# Patient Record
Sex: FEMALE | Race: WHITE | Employment: UNEMPLOYED | URBAN - METROPOLITAN AREA
[De-identification: names, ages, dates, MRNs, and addresses within clinical notes are randomized per-mention and may not be internally consistent; named-entity substitution may affect disease eponyms.]

---

## 2020-08-27 ENCOUNTER — OFFICE VISIT (OUTPATIENT)
Dept: NEUROLOGY | Age: 45
End: 2020-08-27
Payer: MEDICAID

## 2020-08-27 VITALS
WEIGHT: 172 LBS | BODY MASS INDEX: 27.64 KG/M2 | DIASTOLIC BLOOD PRESSURE: 82 MMHG | RESPIRATION RATE: 16 BRPM | HEART RATE: 83 BPM | TEMPERATURE: 98.2 F | HEIGHT: 66 IN | OXYGEN SATURATION: 98 % | SYSTOLIC BLOOD PRESSURE: 116 MMHG

## 2020-08-27 DIAGNOSIS — R41.3 DISTURBANCE OF MEMORY: Primary | ICD-10-CM

## 2020-08-27 DIAGNOSIS — R41.82 ACUTE ON CHRONIC ALTERATION IN MENTAL STATUS: ICD-10-CM

## 2020-08-27 DIAGNOSIS — G40.209 COMPLEX PARTIAL SEIZURES EVOLVING TO GENERALIZED TONIC-CLONIC SEIZURES (HCC): ICD-10-CM

## 2020-08-27 DIAGNOSIS — R55 CONVULSIVE SYNCOPE: ICD-10-CM

## 2020-08-27 DIAGNOSIS — E53.8 B12 DEFICIENCY: ICD-10-CM

## 2020-08-27 DIAGNOSIS — G73.7 METABOLIC MYOPATHY: ICD-10-CM

## 2020-08-27 DIAGNOSIS — I65.23 BILATERAL CAROTID ARTERY STENOSIS: ICD-10-CM

## 2020-08-27 DIAGNOSIS — I67.89 CEREBRAL MICROVASCULAR DISEASE: ICD-10-CM

## 2020-08-27 DIAGNOSIS — R53.1 WEAKNESS GENERALIZED: ICD-10-CM

## 2020-08-27 DIAGNOSIS — E88.9 METABOLIC MYOPATHY: ICD-10-CM

## 2020-08-27 PROCEDURE — 99205 OFFICE O/P NEW HI 60 MIN: CPT | Performed by: PSYCHIATRY & NEUROLOGY

## 2020-08-27 PROCEDURE — 93880 EXTRACRANIAL BILAT STUDY: CPT | Performed by: PSYCHIATRY & NEUROLOGY

## 2020-08-27 RX ORDER — BUPROPION HYDROCHLORIDE 150 MG/1
150 TABLET, EXTENDED RELEASE ORAL 2 TIMES DAILY
COMMUNITY
Start: 2020-08-01 | End: 2020-11-30

## 2020-08-27 RX ORDER — LANOLIN ALCOHOL/MO/W.PET/CERES
1000 CREAM (GRAM) TOPICAL DAILY
Status: ON HOLD | COMMUNITY
End: 2022-08-22

## 2020-08-27 RX ORDER — PANTOPRAZOLE SODIUM 40 MG/1
TABLET, DELAYED RELEASE ORAL
COMMUNITY
Start: 2020-06-11

## 2020-08-27 RX ORDER — IBUPROFEN 200 MG
TABLET ORAL
COMMUNITY
Start: 2016-03-24 | End: 2022-08-30

## 2020-08-27 RX ORDER — SENNOSIDES 15 MG
25 TABLET ORAL DAILY
COMMUNITY
End: 2022-08-30

## 2020-08-27 RX ORDER — ZIPRASIDONE HYDROCHLORIDE 40 MG/1
40 CAPSULE ORAL DAILY
COMMUNITY
Start: 2020-08-12 | End: 2022-08-30

## 2020-08-27 RX ORDER — AMITRIPTYLINE HYDROCHLORIDE 150 MG/1
150 TABLET, FILM COATED ORAL AT BEDTIME
COMMUNITY
Start: 2019-07-02 | End: 2022-08-30

## 2020-08-27 RX ORDER — FLUTICASONE FUROATE AND VILANTEROL TRIFENATATE 100; 25 UG/1; UG/1
1 POWDER RESPIRATORY (INHALATION)
COMMUNITY
End: 2020-08-27 | Stop reason: ALTCHOICE

## 2020-08-27 RX ORDER — FLUTICASONE PROPIONATE AND SALMETEROL 250; 50 UG/1; UG/1
1 POWDER RESPIRATORY (INHALATION)
COMMUNITY
Start: 2016-12-12 | End: 2020-08-27 | Stop reason: ALTCHOICE

## 2020-08-27 RX ORDER — CITALOPRAM 40 MG/1
40 TABLET, FILM COATED ORAL DAILY
Status: ON HOLD | COMMUNITY
Start: 2019-07-02 | End: 2022-09-26 | Stop reason: SDUPTHER

## 2020-08-27 RX ORDER — RISPERIDONE 2 MG/1
TABLET, FILM COATED ORAL
COMMUNITY
Start: 2014-01-31 | End: 2020-08-27 | Stop reason: ALTCHOICE

## 2020-08-27 RX ORDER — CHOLECALCIFEROL TAB 125 MCG (5000 UNIT) 125 MCG
TAB ORAL DAILY
COMMUNITY

## 2020-08-27 RX ORDER — MELATONIN
DAILY
Status: ON HOLD | COMMUNITY
End: 2022-08-22 | Stop reason: ALTCHOICE

## 2020-08-27 RX ORDER — CARBAMAZEPINE 200 MG/1
TABLET ORAL
Status: ON HOLD | COMMUNITY
Start: 2019-07-02 | End: 2022-09-26 | Stop reason: SDUPTHER

## 2020-08-27 RX ORDER — ALBUTEROL SULFATE 90 UG/1
AEROSOL, METERED RESPIRATORY (INHALATION)
Status: ON HOLD | COMMUNITY
Start: 2016-03-07 | End: 2022-08-22

## 2020-08-27 RX ORDER — MICONAZOLE NITRATE 2 %
CREAM WITH APPLICATOR VAGINAL
COMMUNITY
Start: 2019-07-21 | End: 2020-08-27 | Stop reason: ALTCHOICE

## 2020-08-27 RX ORDER — IBUPROFEN 600 MG/1
TABLET ORAL
COMMUNITY
Start: 2018-11-02 | End: 2020-08-27 | Stop reason: ALTCHOICE

## 2020-08-27 RX ORDER — ESOMEPRAZOLE MAGNESIUM 40 MG/1
CAPSULE, DELAYED RELEASE ORAL
Status: ON HOLD | COMMUNITY
End: 2022-08-22

## 2020-08-27 RX ORDER — BUDESONIDE AND FORMOTEROL FUMARATE DIHYDRATE 160; 4.5 UG/1; UG/1
AEROSOL RESPIRATORY (INHALATION)
COMMUNITY
Start: 2016-09-12 | End: 2020-08-27 | Stop reason: ALTCHOICE

## 2020-08-27 RX ORDER — LORATADINE 10 MG/1
TABLET ORAL
Status: ON HOLD | COMMUNITY
Start: 2019-08-04 | End: 2022-08-22

## 2020-08-27 NOTE — LETTER
8/27/20 Patient: Nhan Dai YOB: 1975 Date of Visit: 8/27/2020 Sawyer Chavez MD 
[address] [facility] [address] VIA Facsimile: [phone] Dear Sawyer Chavez MD, Thank you for referring Ms. Jennifer Chew to [address] for evaluation. My notes for this consultation are attached. Consult REFERRED BY: 
Arlan Saint, MD 
 
CHIEF COMPLAINT: Memory loss, weakness in the legs, tingling in her head, losing muscle control, difficulty walking, weakness in her arms, staring spells, and the past history of seizures possibly. Subjective:  
 
Jennifer Le is a 54 y.o. right-handed  female seen as a new patient to me at the request of Dr. Shobha Dempsey for a multiplicity of neurologic complaints of generalized weakness, increasing weakness in her arms and legs, tingling in her head, increasing memory loss, difficulty concentrating, difficulty organizing her thoughts, more unsteady gait, and more spells of staring off in the past history of seizures. Patient says that these have been going on for about 6 to 12 months time. They are gradually getting worse. She knows of no precipitating factors as far as unusual head injury, infections, illnesses, medications, but is under increased stress and tension. She is also been found to have recent elevated white count that is being evaluated by her medical doctors. We have none of that lab work available to us at this time. She said her primary doctor has just checked a bunch of blood test.  For her episodes of staring off and her spells, we checked a carotid Doppler that was unremarkable today. We will check an ambulatory 24-hour EEG in addition, and an EMG because of her muscle weakness, and check her muscle enzymes, and check treatable causes of her memory loss as far as B12 and vitamin D, she says her PCP has checked the thyroid.   We will refer her to neuropsych testing for the memory loss to rule out dementia versus pseudodementia, and she will get an MRI of the brain because of increasing headaches, and increasing weakness in her arms and legs. Other blood test done to rule out treatable causes of her weakness like a myasthenia titer and protein immunoelectrophoresis. Patient does have 1/12 grade education but no family history of dementia. She has had no unusual fever or meningismus, and denies any unusual head injury or other causes or toxin exposure for her memory loss. Patient does have bipolar disorder and takes antidepressants and Tegretol. Patient has a high school education. Past Medical History:  
Diagnosis Date  ADHD  Bipolar disease, chronic ([facility]  Borderline personality disorder ([facility]  Hepatitis C   
 Liver disease Hepatitis C: resolved Past Surgical History:  
Procedure Laterality Date  HX GYN    
 C Section x 3  
 HX GYN    
 D & C  
 HX GYN  x 3 Family History Problem Relation Age of Onset  Heart Disease Mother  Diabetes Mother  Thyroid Disease Mother  Hypertension Mother  No Known Problems Father  Cancer Maternal Grandmother   
     kidney Social History Tobacco Use  Smoking status: Current Every Day Smoker Packs/day: 1.00  Smokeless tobacco: Never Used Substance Use Topics  Alcohol use: Not Currently Current Outpatient Medications:  
  albuterol (PROVENTIL HFA, VENTOLIN HFA, PROAIR HFA) 90 mcg/actuation inhaler, 2 puffs as needed Inhalation every 4 hrs for 30 days, Disp: , Rfl:  
  buPROPion SR (WELLBUTRIN SR) 150 mg SR tablet, 150 mg two (2) times a day., Disp: , Rfl:  
  carBAMazepine (TEGretol) 200 mg tablet, 1 tablet Orally Twice a day, Disp: , Rfl:  
  citalopram (CELEXA) 40 mg tablet, Take 40 mg by mouth daily. , Disp: , Rfl:  
  esomeprazole (NEXIUM) 40 mg capsule, take 1 capsule by mouth once daily Orally 1 time daily, Disp: , Rfl:  
  loratadine (Claritin) 10 mg tablet, 1 tablet Orally Once a day for 90 days, Disp: , Rfl:  
  pantoprazole (PROTONIX) 40 mg tablet, TAKE 1 TABLET BY MOUTH ONCE DAILY, Disp: , Rfl:  
  ziprasidone (GEODON) 40 mg capsule, Take 40 mg by mouth daily. , Disp: , Rfl:  
  amitriptyline (ELAVIL) 150 mg tablet, Take 150 mg by mouth At bedtime. , Disp: , Rfl:  
  sennosides (Laxative, sennosides,) 25 mg tab, Take 25 mg by mouth daily. , Disp: , Rfl:  
  cyanocobalamin 1,000 mcg tablet, Take 1,000 mcg by mouth daily. , Disp: , Rfl:  
  cholecalciferol (Vitamin D3) (1000 Units /25 mcg) tablet, Take  by mouth daily. , Disp: , Rfl:  
  nicotine (Nicoderm CQ) 21 mg/24 hr, 1 patch to skin Transdermal Once a day - remove patch at night for 42 days, Disp: , Rfl:  
  cholecalciferol (VITAMIN D3) (5000 Units/125 mcg) tab tablet, Take  by mouth daily. , Disp: , Rfl:  
  clonazePAM (KLONOPIN) 0.5 mg tablet, Take 0.5 mg by mouth three (3) times daily. , Disp: , Rfl:  
  OXCARBAZEPINE PO, Take  by mouth., Disp: , Rfl:  
 
 
 
Allergies Allergen Reactions  Percocet [Oxycodone-Acetaminophen] Hives MRI Results (most recent): No results found for this or any previous visit. No results found for this or any previous visit. Review of Systems: A comprehensive review of systems was negative except for: Constitutional: positive for fatigue and malaise Musculoskeletal: positive for myalgias, arthralgias, stiff joints, neck pain, back pain and muscle weakness Neurological: positive for headaches, memory problems, speech problems, coordination problems, gait problems and weakness Behvioral/Psych: positive for anxiety and depression Vitals:  
 08/27/20 0807 BP: 116/82 Pulse: 83 Resp: 16 Temp: 98.2 °F (36.8 °C) SpO2: 98% Weight: 172 lb (78 kg) Height: 5' 6\" (1.676 m) Objective: I 
 
 
NEUROLOGICAL EXAM: 
 
 Appearance: The patient is poorly developed, well nourished, provides a coherent history and is in no acute distress. Mental Status: Oriented to time, place and person, and the president, she is able to remember 2 of 3 words at 30 seconds with distraction, cannot do serial sevens by counting on her fingers, can spell world backward and draw a clock showing the time 10:50, cognitive function is relatively normal and speech is fluent and no aphasia or dysarthria. Mood and affect appropriate but severely depressed. Cranial Nerves:   Intact visual fields. Fundi are benign, disc are flat, no lesions seen on funduscopy. NICOLASA, EOM's full, no nystagmus, no ptosis. Facial sensation is normal. Corneal reflexes are not tested. Facial movement is symmetric. Hearing is normal bilaterally. Palate is midline with normal sternocleidomastoid and trapezius muscles are normal. Tongue is midline. Neck without meningismus or bruits Temporal arteries are not tender or enlarged TMJ areas are not tender on palpation Motor:  4/5 strength in upper and lower proximal and distal muscles. Normal bulk and tone. No fasciculations. Rapid alternating movement is symmetric and intact bilaterally Reflexes:   Deep tendon reflexes 1+/4 and symmetrical. 
No babinski or clonus present Sensory:   Normal to touch, pinprick and vibration and temperature. DSS is intact Gait:  Normal gait for patient's age. Tremor:   No tremor noted. Cerebellar:  No abnormal cerebellar signs present on Romberg and tandem testing and finger-nose-finger exam.  
Neurovascular:  Normal heart sounds and regular rhythm, peripheral pulses intact, and no carotid bruits. Assessment: ICD-10-CM ICD-9-CM 1. Disturbance of memory  R41.3 780.93 CK IMMUNOELECTROPHORESIS (IMMUNOFIX.) MAGNESIUM  
   MYASTHENIA GRAVIS EVALUATION  
   ALEKSEY COMPREHENSIVE PLUS PANEL  
   DUPLEX CAROTID BILATERAL  
   EMG LIMITED  
   MRI BRAIN W WO CONT VITAMIN B12 & FOLATE  
   NEURO EEG 24 HR  
   REFERRAL TO NEUROPSYCHOLOGY 2. Acute on chronic alteration in mental status  R41.82 780.09 CK IMMUNOELECTROPHORESIS (IMMUNOFIX.) MAGNESIUM  
   MYASTHENIA GRAVIS EVALUATION  
   ALEKSEY COMPREHENSIVE PLUS PANEL  
   DUPLEX CAROTID BILATERAL  
   EMG LIMITED  
   MRI BRAIN W WO CONT  
   VITAMIN B12 & FOLATE  
   NEURO EEG 24 HR  
   REFERRAL TO NEUROPSYCHOLOGY 3. Weakness generalized  R53.1 780.79 CK IMMUNOELECTROPHORESIS (IMMUNOFIX.) MAGNESIUM  
   MYASTHENIA GRAVIS EVALUATION  
   ALEKSEY COMPREHENSIVE PLUS PANEL  
   DUPLEX CAROTID BILATERAL  
   EMG LIMITED  
   MRI BRAIN W WO CONT  
   VITAMIN B12 & FOLATE  
   NEURO EEG 24 HR  
   REFERRAL TO NEUROPSYCHOLOGY 4. Metabolic myopathy  E36.0 349.84 CK  
 G73.7  IMMUNOELECTROPHORESIS (IMMUNOFIX.) MAGNESIUM  
   MYASTHENIA GRAVIS EVALUATION  
   ALEKSEY COMPREHENSIVE PLUS PANEL  
   DUPLEX CAROTID BILATERAL  
   EMG LIMITED  
   MRI BRAIN W WO CONT  
   VITAMIN B12 & FOLATE  
   NEURO EEG 24 HR  
   REFERRAL TO NEUROPSYCHOLOGY 5. Bilateral carotid artery stenosis  I65.23 433.10 CK  
  433.30 IMMUNOELECTROPHORESIS (IMMUNOFIX.) MAGNESIUM  
   MYASTHENIA GRAVIS EVALUATION  
   ALEKSEY COMPREHENSIVE PLUS PANEL  
   DUPLEX CAROTID BILATERAL  
   EMG LIMITED  
   MRI BRAIN W WO CONT  
   VITAMIN B12 & FOLATE  
   NEURO EEG 24 HR  
   REFERRAL TO NEUROPSYCHOLOGY 6. Convulsive syncope  R55 780.2 CK  
  780.39 IMMUNOELECTROPHORESIS (IMMUNOFIX.) MAGNESIUM  
   MYASTHENIA GRAVIS EVALUATION  
   ALEKSEY COMPREHENSIVE PLUS PANEL  
   DUPLEX CAROTID BILATERAL  
   EMG LIMITED  
   MRI BRAIN W WO CONT  
   VITAMIN B12 & FOLATE  
   NEURO EEG 24 HR  
   REFERRAL TO NEUROPSYCHOLOGY 7. Cerebral microvascular disease  I67.9 437.9 CK  
   IMMUNOELECTROPHORESIS (IMMUNOFIX.) MAGNESIUM  
   MYASTHENIA GRAVIS EVALUATION  
   ALEKSEY COMPREHENSIVE PLUS PANEL  
   DUPLEX CAROTID BILATERAL  
   EMG LIMITED MRI BRAIN W WO CONT  
   VITAMIN B12 & FOLATE  
   NEURO EEG 24 HR  
   REFERRAL TO NEUROPSYCHOLOGY 8. Complex partial seizures evolving to generalized tonic-clonic seizures (HCC)  G40.209 345.40 CK IMMUNOELECTROPHORESIS (IMMUNOFIX.) MAGNESIUM  
   MYASTHENIA GRAVIS EVALUATION  
   ALEKSEY COMPREHENSIVE PLUS PANEL  
   DUPLEX CAROTID BILATERAL  
   EMG LIMITED  
   MRI BRAIN W WO CONT  
   VITAMIN B12 & FOLATE  
   NEURO EEG 24 HR  
   REFERRAL TO NEUROPSYCHOLOGY 9. B12 deficiency  E53.8 266.2 CK IMMUNOELECTROPHORESIS (IMMUNOFIX.) MAGNESIUM  
   MYASTHENIA GRAVIS EVALUATION  
   ALEKSEY COMPREHENSIVE PLUS PANEL  
   DUPLEX CAROTID BILATERAL  
   EMG LIMITED  
   MRI BRAIN W WO CONT  
   VITAMIN B12 & FOLATE  
   NEURO EEG 24 HR  
   REFERRAL TO NEUROPSYCHOLOGY Active Problems: * No active hospital problems. * 
 
 
Plan:  
 
Patient with a multiplicity of complaints, one being the memory loss and cognitive impairment, and for that we will get neuropsych testing, get an MRI of the brain, and check metabolic parameters to rule out treatable causes of this syndrome. Patient complaining of generalized weakness of her arms and legs, and she does not exercise much, and I suspect a lot of this is disuse atrophy and chronic stress and fatigue. We will check an EMG study and all her metabolic parameters to rule out treatable causes of her symptoms. Her carotid Doppler study today was relatively unremarkable. We tried to encourage the patient to make sure that she exercises regularly, and they help guide recommendations are half an hour every day over an hour every other day of mild exercise. We encouraged the patient to take a multivitamin every day and try to stay mentally and physically active.  
Further treatment and evaluation will depend on the results of this initial screen, we will see her again in 3 months time or earlier as need be after the above-mentioned test. 
 For her possible seizures and staring spells and absent spells we will check a 24-hour ambulatory EEG in addition to the attacks as mentioned above. Signed By: Marcial Bingham MD   
 August 27, 2020 CC: Arlan Saint, MD 
FAX: [phone] This note will not be viewable in [address] If you have questions, please do not hesitate to call me. I look forward to following your patient along with you. Sincerely, Marcial Bingham MD

## 2020-08-27 NOTE — PATIENT INSTRUCTIONS
Office Policies    o Phone calls/patient messages:  Please allow up to 24 hours for someone in the office to contact you about your call or message. Be mindful your provider may be out of the office or your message may require further review. We encourage you to use [employer] for your messages as this is a faster, more efficient way to communicate with our office    o Medication Refills:  Prescription medications require up to 48 business hours to process. We encourage you to use [employer] for your refills. For controlled medications: Please allow up to 72 business hours to process. Certain medications may require you to  a written prescription at our office. NO narcotic/controlled medications will be prescribed after 4pm Monday through Friday or on weekends    o Form/Paperwork Completion:  We ask that you allow 7-14 business days. You may also download your forms to [employer] to have your doctor print off.

## 2020-08-28 NOTE — PROGRESS NOTES
Consult  REFERRED BY:  Anil Zurita MD    CHIEF COMPLAINT: Memory loss, weakness in the legs, tingling in her head, losing muscle control, difficulty walking, weakness in her arms, staring spells, and the past history of seizures possibly. Subjective:     Jennifer Vela is a 54 y.o. right-handed  female seen as a new patient to me at the request of Dr. Hesham Munson for a multiplicity of neurologic complaints of generalized weakness, increasing weakness in her arms and legs, tingling in her head, increasing memory loss, difficulty concentrating, difficulty organizing her thoughts, more unsteady gait, and more spells of staring off in the past history of seizures. Patient says that these have been going on for about 6 to 12 months time. They are gradually getting worse. She knows of no precipitating factors as far as unusual head injury, infections, illnesses, medications, but is under increased stress and tension. She is also been found to have recent elevated white count that is being evaluated by her medical doctors. We have none of that lab work available to us at this time. She said her primary doctor has just checked a bunch of blood test.  For her episodes of staring off and her spells, we checked a carotid Doppler that was unremarkable today. We will check an ambulatory 24-hour EEG in addition, and an EMG because of her muscle weakness, and check her muscle enzymes, and check treatable causes of her memory loss as far as B12 and vitamin D, she says her PCP has checked the thyroid. We will refer her to neuropsych testing for the memory loss to rule out dementia versus pseudodementia, and she will get an MRI of the brain because of increasing headaches, and increasing weakness in her arms and legs. Other blood test done to rule out treatable causes of her weakness like a myasthenia titer and protein immunoelectrophoresis.   Patient does have 1/12 grade education but no family history of dementia. She has had no unusual fever or meningismus, and denies any unusual head injury or other causes or toxin exposure for her memory loss. Patient does have bipolar disorder and takes antidepressants and Tegretol. Patient has a high school education. Past Medical History:   Diagnosis Date    ADHD     Bipolar disease, chronic (HCC)     Borderline personality disorder (HCC)     Hepatitis C     Liver disease     Hepatitis C: resolved      Past Surgical History:   Procedure Laterality Date    HX GYN      C Section x 3    HX GYN      D & C    HX GYN       x 3     Family History   Problem Relation Age of Onset    Heart Disease Mother     Diabetes Mother     Thyroid Disease Mother     Hypertension Mother     No Known Problems Father     Cancer Maternal Grandmother         kidney      Social History     Tobacco Use    Smoking status: Current Every Day Smoker     Packs/day: 1.00    Smokeless tobacco: Never Used   Substance Use Topics    Alcohol use: Not Currently         Current Outpatient Medications:     albuterol (PROVENTIL HFA, VENTOLIN HFA, PROAIR HFA) 90 mcg/actuation inhaler, 2 puffs as needed Inhalation every 4 hrs for 30 days, Disp: , Rfl:     buPROPion SR (WELLBUTRIN SR) 150 mg SR tablet, 150 mg two (2) times a day., Disp: , Rfl:     carBAMazepine (TEGretol) 200 mg tablet, 1 tablet Orally Twice a day, Disp: , Rfl:     citalopram (CELEXA) 40 mg tablet, Take 40 mg by mouth daily. , Disp: , Rfl:     esomeprazole (NEXIUM) 40 mg capsule, take 1 capsule by mouth once daily Orally 1 time daily, Disp: , Rfl:     loratadine (Claritin) 10 mg tablet, 1 tablet Orally Once a day for 90 days, Disp: , Rfl:     pantoprazole (PROTONIX) 40 mg tablet, TAKE 1 TABLET BY MOUTH ONCE DAILY, Disp: , Rfl:     ziprasidone (GEODON) 40 mg capsule, Take 40 mg by mouth daily. , Disp: , Rfl:     amitriptyline (ELAVIL) 150 mg tablet, Take 150 mg by mouth At bedtime. , Disp: , Rfl:     sennosides (Laxative, sennosides,) 25 mg tab, Take 25 mg by mouth daily. , Disp: , Rfl:     cyanocobalamin 1,000 mcg tablet, Take 1,000 mcg by mouth daily. , Disp: , Rfl:     cholecalciferol (Vitamin D3) (1000 Units /25 mcg) tablet, Take  by mouth daily. , Disp: , Rfl:     nicotine (Nicoderm CQ) 21 mg/24 hr, 1 patch to skin Transdermal Once a day - remove patch at night for 42 days, Disp: , Rfl:     cholecalciferol (VITAMIN D3) (5000 Units/125 mcg) tab tablet, Take  by mouth daily. , Disp: , Rfl:     clonazePAM (KLONOPIN) 0.5 mg tablet, Take 0.5 mg by mouth three (3) times daily. , Disp: , Rfl:     OXCARBAZEPINE PO, Take  by mouth., Disp: , Rfl:         Allergies   Allergen Reactions    Percocet [Oxycodone-Acetaminophen] Hives      MRI Results (most recent):  No results found for this or any previous visit. No results found for this or any previous visit. Review of Systems:  A comprehensive review of systems was negative except for: Constitutional: positive for fatigue and malaise  Musculoskeletal: positive for myalgias, arthralgias, stiff joints, neck pain, back pain and muscle weakness  Neurological: positive for headaches, memory problems, speech problems, coordination problems, gait problems and weakness  Behvioral/Psych: positive for anxiety and depression   Vitals:    08/27/20 0854   BP: 116/82   Pulse: 83   Resp: 16   Temp: 98.2 °F (36.8 °C)   SpO2: 98%   Weight: 172 lb (78 kg)   Height: 5' 6\" (1.676 m)     Objective:     I      NEUROLOGICAL EXAM:    Appearance: The patient is poorly developed, well nourished, provides a coherent history and is in no acute distress. Mental Status: Oriented to time, place and person, and the president, she is able to remember 2 of 3 words at 30 seconds with distraction, cannot do serial sevens by counting on her fingers, can spell world backward and draw a clock showing the time 10:50, cognitive function is relatively normal and speech is fluent and no aphasia or dysarthria. Mood and affect appropriate but severely depressed. Cranial Nerves:   Intact visual fields. Fundi are benign, disc are flat, no lesions seen on funduscopy. NICOLASA, EOM's full, no nystagmus, no ptosis. Facial sensation is normal. Corneal reflexes are not tested. Facial movement is symmetric. Hearing is normal bilaterally. Palate is midline with normal sternocleidomastoid and trapezius muscles are normal. Tongue is midline. Neck without meningismus or bruits  Temporal arteries are not tender or enlarged  TMJ areas are not tender on palpation   Motor:  4/5 strength in upper and lower proximal and distal muscles. Normal bulk and tone. No fasciculations. Rapid alternating movement is symmetric and intact bilaterally   Reflexes:   Deep tendon reflexes 1+/4 and symmetrical.  No babinski or clonus present   Sensory:   Normal to touch, pinprick and vibration and temperature. DSS is intact   Gait:  Normal gait for patient's age. Tremor:   No tremor noted. Cerebellar:  No abnormal cerebellar signs present on Romberg and tandem testing and finger-nose-finger exam.   Neurovascular:  Normal heart sounds and regular rhythm, peripheral pulses intact, and no carotid bruits. Assessment:       ICD-10-CM ICD-9-CM    1.  Disturbance of memory  R41.3 780.93 CK      IMMUNOELECTROPHORESIS (IMMUNOFIX.)      MAGNESIUM      MYASTHENIA GRAVIS EVALUATION      ALEKSEY COMPREHENSIVE PLUS PANEL      DUPLEX CAROTID BILATERAL      EMG LIMITED      MRI BRAIN W WO CONT      VITAMIN B12 & FOLATE      NEURO EEG 24 HR      REFERRAL TO NEUROPSYCHOLOGY   2. Acute on chronic alteration in mental status  R41.82 780.09 CK      IMMUNOELECTROPHORESIS (IMMUNOFIX.)      MAGNESIUM      MYASTHENIA GRAVIS EVALUATION      ALEKSEY COMPREHENSIVE PLUS PANEL      DUPLEX CAROTID BILATERAL      EMG LIMITED      MRI BRAIN W WO CONT      VITAMIN B12 & FOLATE      NEURO EEG 24 HR      REFERRAL TO NEUROPSYCHOLOGY   3. Weakness generalized  R53.1 780.79 CK IMMUNOELECTROPHORESIS (IMMUNOFIX.)      MAGNESIUM      MYASTHENIA GRAVIS EVALUATION      ALEKSEY COMPREHENSIVE PLUS PANEL      DUPLEX CAROTID BILATERAL      EMG LIMITED      MRI BRAIN W WO CONT      VITAMIN B12 & FOLATE      NEURO EEG 24 HR      REFERRAL TO NEUROPSYCHOLOGY   4. Metabolic myopathy  X73.9 020.56 CK    G73.7  IMMUNOELECTROPHORESIS (IMMUNOFIX.)      MAGNESIUM      MYASTHENIA GRAVIS EVALUATION      ALEKSEY COMPREHENSIVE PLUS PANEL      DUPLEX CAROTID BILATERAL      EMG LIMITED      MRI BRAIN W WO CONT      VITAMIN B12 & FOLATE      NEURO EEG 24 HR      REFERRAL TO NEUROPSYCHOLOGY   5. Bilateral carotid artery stenosis  I65.23 433.10 CK     433.30 IMMUNOELECTROPHORESIS (IMMUNOFIX.)      MAGNESIUM      MYASTHENIA GRAVIS EVALUATION      ALEKSEY COMPREHENSIVE PLUS PANEL      DUPLEX CAROTID BILATERAL      EMG LIMITED      MRI BRAIN W WO CONT      VITAMIN B12 & FOLATE      NEURO EEG 24 HR      REFERRAL TO NEUROPSYCHOLOGY   6. Convulsive syncope  R55 780.2 CK     780.39 IMMUNOELECTROPHORESIS (IMMUNOFIX.)      MAGNESIUM      MYASTHENIA GRAVIS EVALUATION      ALEKSEY COMPREHENSIVE PLUS PANEL      DUPLEX CAROTID BILATERAL      EMG LIMITED      MRI BRAIN W WO CONT      VITAMIN B12 & FOLATE      NEURO EEG 24 HR      REFERRAL TO NEUROPSYCHOLOGY   7. Cerebral microvascular disease  I67.9 437.9 CK      IMMUNOELECTROPHORESIS (IMMUNOFIX.)      MAGNESIUM      MYASTHENIA GRAVIS EVALUATION      ALEKSEY COMPREHENSIVE PLUS PANEL      DUPLEX CAROTID BILATERAL      EMG LIMITED      MRI BRAIN W WO CONT      VITAMIN B12 & FOLATE      NEURO EEG 24 HR      REFERRAL TO NEUROPSYCHOLOGY   8.  Complex partial seizures evolving to generalized tonic-clonic seizures (HCC)  G40.209 345.40 CK      IMMUNOELECTROPHORESIS (IMMUNOFIX.)      MAGNESIUM      MYASTHENIA GRAVIS EVALUATION      ALEKSEY COMPREHENSIVE PLUS PANEL      DUPLEX CAROTID BILATERAL      EMG LIMITED      MRI BRAIN W WO CONT      VITAMIN B12 & FOLATE      NEURO EEG 24 HR      REFERRAL TO NEUROPSYCHOLOGY   9. B12 deficiency  E53.8 266.2 CK      IMMUNOELECTROPHORESIS (IMMUNOFIX.)      MAGNESIUM      MYASTHENIA GRAVIS EVALUATION      ALEKSEY COMPREHENSIVE PLUS PANEL      DUPLEX CAROTID BILATERAL      EMG LIMITED      MRI BRAIN W WO CONT      VITAMIN B12 & FOLATE      NEURO EEG 24 HR      REFERRAL TO NEUROPSYCHOLOGY     Active Problems:    * No active hospital problems. *      Plan:     Patient with a multiplicity of complaints, one being the memory loss and cognitive impairment, and for that we will get neuropsych testing, get an MRI of the brain, and check metabolic parameters to rule out treatable causes of this syndrome. Patient complaining of generalized weakness of her arms and legs, and she does not exercise much, and I suspect a lot of this is disuse atrophy and chronic stress and fatigue. We will check an EMG study and all her metabolic parameters to rule out treatable causes of her symptoms. Her carotid Doppler study today was relatively unremarkable. We tried to encourage the patient to make sure that she exercises regularly, and they help guide recommendations are half an hour every day over an hour every other day of mild exercise. We encouraged the patient to take a multivitamin every day and try to stay mentally and physically active. Further treatment and evaluation will depend on the results of this initial screen, we will see her again in 3 months time or earlier as need be after the above-mentioned test.  For her possible seizures and staring spells and absent spells we will check a 24-hour ambulatory EEG in addition to the attacks as mentioned above. Signed By: Redd Escobedo MD     August 27, 2020       CC: Ty Loyd MD  FAX: [phone]    This note will not be viewable in [address]

## 2020-09-03 DIAGNOSIS — R55 CONVULSIVE SYNCOPE: ICD-10-CM

## 2020-09-03 DIAGNOSIS — I65.23 BILATERAL CAROTID ARTERY STENOSIS: ICD-10-CM

## 2020-09-03 DIAGNOSIS — G40.209 COMPLEX PARTIAL SEIZURES EVOLVING TO GENERALIZED TONIC-CLONIC SEIZURES (HCC): ICD-10-CM

## 2020-09-03 DIAGNOSIS — E88.9 METABOLIC MYOPATHY: ICD-10-CM

## 2020-09-03 DIAGNOSIS — R53.1 WEAKNESS GENERALIZED: ICD-10-CM

## 2020-09-03 DIAGNOSIS — G73.7 METABOLIC MYOPATHY: ICD-10-CM

## 2020-09-03 DIAGNOSIS — R41.82 ACUTE ON CHRONIC ALTERATION IN MENTAL STATUS: ICD-10-CM

## 2020-09-03 DIAGNOSIS — I67.89 CEREBRAL MICROVASCULAR DISEASE: ICD-10-CM

## 2020-09-03 DIAGNOSIS — E53.8 B12 DEFICIENCY: ICD-10-CM

## 2020-09-03 DIAGNOSIS — R41.3 DISTURBANCE OF MEMORY: ICD-10-CM

## 2020-09-15 ENCOUNTER — OFFICE VISIT (OUTPATIENT)
Dept: NEUROLOGY | Age: 45
End: 2020-09-15

## 2020-09-15 VITALS — TEMPERATURE: 97.6 F

## 2020-09-15 DIAGNOSIS — I67.89 CEREBRAL MICROVASCULAR DISEASE: ICD-10-CM

## 2020-09-15 DIAGNOSIS — R41.3 DISTURBANCE OF MEMORY: Primary | ICD-10-CM

## 2020-09-15 DIAGNOSIS — F90.2 ATTENTION DEFICIT HYPERACTIVITY DISORDER (ADHD), COMBINED TYPE: ICD-10-CM

## 2020-09-15 DIAGNOSIS — F31.62 BIPOLAR DISORDER, CURRENT EPISODE MIXED, MODERATE (HCC): ICD-10-CM
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
## 2020-09-21 ENCOUNTER — TELEPHONE (OUTPATIENT)
Dept: NEUROLOGY | Age: 45
End: 2020-09-21

## 2020-09-21 NOTE — TELEPHONE ENCOUNTER
----- Message from Janneth Nix sent at 9/21/2020 10:03 AM EDT -----  Regarding: Dr. Noah Hoyos  General Message/Vendor Calls    Caller's first and last name: Pt      Reason for call: Requesting to schedule for testing for ADHD      Callback required yes/no and why: Yes      Best contact number(s): [phone]      Details to clarify the request:      Janneth Nix

## 2020-09-23 ENCOUNTER — HOSPITAL ENCOUNTER (OUTPATIENT)
Dept: MRI IMAGING | Age: 45
Discharge: HOME | End: 2020-09-23
Attending: PSYCHIATRY & NEUROLOGY
Payer: MEDICAID

## 2020-09-23 ENCOUNTER — OFFICE VISIT (OUTPATIENT)
Dept: NEUROLOGY | Age: 45
End: 2020-09-23
Payer: MEDICAID

## 2020-09-23 ENCOUNTER — DOCUMENTATION ONLY (OUTPATIENT)
Dept: NEUROLOGY | Age: 45
End: 2020-09-23

## 2020-09-23 ENCOUNTER — TELEPHONE (OUTPATIENT)
Dept: NEUROLOGY | Age: 45
End: 2020-09-23

## 2020-09-23 DIAGNOSIS — F90.2 ATTENTION DEFICIT HYPERACTIVITY DISORDER (ADHD), COMBINED TYPE: ICD-10-CM

## 2020-09-23 DIAGNOSIS — I67.89 CEREBRAL MICROVASCULAR DISEASE: ICD-10-CM

## 2020-09-23 DIAGNOSIS — I65.23 BILATERAL CAROTID ARTERY STENOSIS: ICD-10-CM

## 2020-09-23 DIAGNOSIS — R41.82 ACUTE ON CHRONIC ALTERATION IN MENTAL STATUS: ICD-10-CM

## 2020-09-23 DIAGNOSIS — R55 CONVULSIVE SYNCOPE: ICD-10-CM

## 2020-09-23 DIAGNOSIS — R53.1 WEAKNESS GENERALIZED: ICD-10-CM

## 2020-09-23 DIAGNOSIS — G40.209 COMPLEX PARTIAL SEIZURES EVOLVING TO GENERALIZED TONIC-CLONIC SEIZURES (HCC): ICD-10-CM

## 2020-09-23 DIAGNOSIS — G73.7 METABOLIC MYOPATHY: ICD-10-CM

## 2020-09-23 DIAGNOSIS — R41.3 DISTURBANCE OF MEMORY: Primary | ICD-10-CM

## 2020-09-23 DIAGNOSIS — E88.9 METABOLIC MYOPATHY: ICD-10-CM

## 2020-09-23 DIAGNOSIS — E53.8 B12 DEFICIENCY: ICD-10-CM

## 2020-09-23 PROCEDURE — 95913 NRV CNDJ TEST 13/> STUDIES: CPT | Performed by: PSYCHIATRY & NEUROLOGY

## 2020-09-23 PROCEDURE — A9575 INJ GADOTERATE MEGLUMI 0.1ML: HCPCS | Performed by: PSYCHIATRY & NEUROLOGY

## 2020-09-23 PROCEDURE — 70553 MRI BRAIN STEM W/O & W/DYE: CPT

## 2020-09-23 PROCEDURE — 74011250636 HC RX REV CODE- 250/636: Performed by: PSYCHIATRY & NEUROLOGY

## 2020-09-23 PROCEDURE — 95886 MUSC TEST DONE W/N TEST COMP: CPT | Performed by: PSYCHIATRY & NEUROLOGY

## 2020-09-23 RX ORDER — GADOTERATE MEGLUMINE 376.9 MG/ML
16 INJECTION INTRAVENOUS
Status: COMPLETED | OUTPATIENT
Start: 2020-09-23 | End: 2020-09-23

## 2020-09-23 RX ADMIN — GADOTERATE MEGLUMINE 16 ML: 376.9 INJECTION INTRAVENOUS at 13:38

## 2020-09-23 NOTE — PROCEDURES
ELECTRODIAGNOSTIC REPORT      Test Date:  2020    Patient: Fabrizio Beatty : 1975 Physician: Lourdes Caballero M.D. Sex: Female  < Ref Phys:      Technician: Rufina Lemons    Patient History: Patient with diffuse pain in her arms and legs, for EMG to rule out radiculopathy, neuropathy, entrapment neuropathy, or other neuromuscular disease. Patient also with complaints of generalized weakness and fatigue and lack of energy. Rule out neuromuscular disease. Neuro Exam: Patient has somewhat hypoactive but symmetric reflexes throughout, and normal muscle bulk and tone. Patient has no Babinski or clonus present. Patient's cranial nerves II through XII are intact. Patient with normal gait and station except for slightly wide-based gait. EMG report: This study shows electrophysiologic evidence of an essentially normal EMG and nerve conduction velocity study of both legs and the left arm, showing no clear evidence of lumbar motor radiculopathy, cervical motor radiculopathy, myopathy, neuromuscular junction disease, neuropathy, or any other clear neuromuscular disease. Clinical correlation recommended and correlation with imaging modalities and metabolic studies may be of further diagnostic benefit if clinically indicated. Also, follow-up studies in 6 to 12 months time may also be of further diagnostic benefit if clinically indicated. EMG & NCV Findings:  Evaluation of the left Fibular motor and the right Fibular motor nerves showed normal distal onset latency (L3.7, R3.8 ms), normal amplitude (L2.6, R3.0 mV), normal conduction velocity (B Fib-Ankle, L53, R45 m/s), and normal conduction velocity (Poplt-B Fib, L56, R83 m/s). The right median motor nerve showed normal distal onset latency (3.0 ms), normal amplitude (5.3 mV), and normal conduction velocity (Elbow-Wrist, 57 m/s).   The left tibial motor and the right tibial motor nerves showed normal distal onset latency (L3.0, R3.0 ms), normal amplitude (L12.3, R12.1 mV), and normal conduction velocity (Knee-Ankle, L50, R48 m/s). The right ulnar motor nerve showed normal distal onset latency (2.3 ms), normal amplitude (8.5 mV), decreased conduction velocity (Wrist-Abd Dig Minimi, 35 m/s), normal conduction velocity (B Elbow-Wrist, 68 m/s), and normal conduction velocity (A Elbow-B Elbow, 71 m/s). The right median sensory nerve showed normal distal onset latency (2.5 ms), normal distal peak latency (3.1 ms), and normal amplitude (17.3 µV). The right radial sensory, the left sural sensory, the right sural sensory, and the right ulnar sensory nerves showed normal distal peak latency (R1.8, L2.9, R3.4, R2.8 ms) and normal amplitude (R42.4, L10.6, R12.0, R21.0 µV). The left Sup Fibular sensory and the right Sup Fibular sensory nerves showed normal distal peak latency (L2.8, R2.3 ms), normal amplitude (L17.8, R22.2 µV), and normal conduction velocity (Lower leg-Lat ankle, L56, R59 m/s).                 __________________  Roxann Brady M.D.        Nerve Conduction Studies  Anti Sensory Summary Table     Stim Site NR Onset (ms) Peak (ms) O-P Amp (µV) Norm Peak (ms) Norm O-P Amp Site1 Site2 Dist (cm) Norm Joseph (m/s)   Right Median Anti Sensory (2nd Digit)  33.5°C   Wrist    2.5 3.1 17.3 <4 >11 Wrist 2nd Digit 14.0    Right Radial Anti Sensory (Base 1st Digit)  33.5°C   Wrist    1.4 1.8 42.4 <2.8 7 Wrist Base 1st Digit 10.0    Left Sup Fibular Anti Sensory (Lat ankle)  33.5°C   Lower leg    1.8 2.8 17.8 <4.4 >5.0 Lower leg Lat ankle 10.0 >32   Right Sup Fibular Anti Sensory (Lat ankle)  33.5°C   Lower leg    1.7 2.3 22.2 <4.4 >5.0 Lower leg Lat ankle 10.0 >32   Left Sural Anti Sensory (Lat Mall)  33.5°C   Calf    2.3 2.9 10.6 <4.5 >4.0 Calf Lat Mall 14.0    Right Sural Anti Sensory (Lat Mall)  33.5°C   Calf    2.4 3.4 12.0 <4.5 >4.0 Calf Lat Mall 14.0    Right Ulnar Anti Sensory (5th Digit)  33.5°C   Wrist    2.3 2.8 21.0 <4.0 >10 Wrist 5th Digit 14.0      Motor Summary Table     Stim Site NR Onset (ms) Norm Onset (ms) O-P Amp (mV) Norm O-P Amp P-T Amp (mV) Site1 Site2 Dist (cm) Joseph (m/s)   Left Fibular Motor (Ext Dig Brev)  33.5°C   Ankle    3.7 <6.5 2.6 >2.6  Ankle Ext Dig Brev 8.0 22   B Fib    9.5  2.4   B Fib Ankle 31.0 53   Poplt    11.3  2.1   Poplt B Fib 10.0 56   Right Fibular Motor (Ext Dig Brev)  33.5°C   Ankle    3.8 <6.5 3.0 >2.6  Ankle Ext Dig Brev 8.0 21   B Fib    10.4  2.5   B Fib Ankle 30.0 45   Poplt    11.6  1.7   Poplt B Fib 10.0 83   Right Median Motor (Abd Poll Brev)  33.5°C   Wrist    3.0 <4.5 5.3 >4.1  Wrist Abd Poll Brev 8.0 27   Elbow    6.5  5.3   Elbow Wrist 20.0 57   Left Tibial Motor (Abd Lai Brev)  33.5°C   Ankle    3.0 <6.1 12.3 >5.3  Ankle Abd Lai Brev 8.0 27   Knee    10.6  12.0   Knee Ankle 38.0 50   Right Tibial Motor (Abd Lai Brev)  33.5°C   Ankle    3.0 <6.1 12.1 >5.3  Ankle Abd Lai Brev 8.0 27   Knee    10.3  10.6   Knee Ankle 35.0 48   Right Ulnar Motor (Abd Dig Minimi)  33.5°C   Wrist    2.3 <3.1 8.5 >7.0  Wrist Abd Dig Minimi 8.0 35   B Elbow    5.4  8.0   B Elbow Wrist 21.0 68   A Elbow    6.8  7.6   A Elbow B Elbow 10.0 71     EMG     Side Muscle Nerve Root Ins Act Fibs Psw Recrt Duration Amp Poly Comment   Left AntTibialis Dp Br Peron L4-5 Nml Nml Nml Nml Nml Nml Nml    Left MedGastroc Tibial S1-2 Nml Nml Nml Nml Nml Nml Nml    Left VastusLat Femoral L2-4 Nml Nml Nml Nml Nml Nml Nml    Left Peroneus Long   Nml Nml Nml Nml Nml Nml Nml    Left Lower Lumb Parasp Rami L5,S1 Nml Nml Nml Nml Nml Nml Nml    Left BicepsFemL Sciatic L5-S2 Nml Nml Nml Nml Nml Nml Nml    Right AntTibialis Dp Br Peron L4-5 Nml Nml Nml Nml Nml Nml Nml    Right VastusLat Femoral L2-4 Nml Nml Nml Nml Nml Nml Nml    Right Peroneus Long   Nml Nml Nml Nml Nml Nml Nml    Right MedGastroc Tibial S1-2 Nml Nml Nml Nml Nml Nml Nml    Right BicepsFemL Sciatic L5-S2 Nml Nml Nml Nml Nml Nml Nml    Right Lower Lumb Parasp Rami L5,S1 Nml Nml Nml Nml Nml Nml Nml Right 1stDorInt Ulnar C8-T1 Nml Nml Nml Nml Nml Nml Nml    Right Biceps Musculocut C5-6 Nml Nml Nml Nml Nml Nml Nml    Right Deltoid Axillary C5-6 Nml Nml Nml Nml Nml Nml Nml    Right Triceps Radial C6-7-8 Nml Nml Nml Nml Nml Nml Nml    Right Abd Poll Brev Median C8-T1 Nml Nml Nml Nml Nml Nml Nml    Right ExtDigitorum Radial (Post Int) C7-8 Nml Nml Nml Nml Nml Nml Nml    Right BrachioRad Radial C5-6 Nml Nml Nml Nml Nml Nml Nml    Right Lower Cerv Parasp Rami C7,T1 Nml Nml Nml Nml Nml Nml Nml          Waveforms:

## 2020-09-23 NOTE — LETTER
9/23/20 Patient: Stephanie Pinzon YOB: 1975 Date of Visit: 9/23/2020 Mayito Tapia MD 
[address] [facility] [address] VIA Facsimile: [phone] Dear Mayito Tapia MD, Thank you for referring Ms. Jennifer Chew to [address] [place] [address] for evaluation. My notes for this consultation are attached. Patient had a normal EMG of both legs and left arm, showing no evidence of any neuromuscular disease. Patient had her MRI scan done and we are awaiting that. Patient is neuropsych testing is also being scheduled. We will follow-up after the above. We have not received her lab work from [facility] we will try to get that. If you have questions, please do not hesitate to call me. I look forward to following your patient along with you. Sincerely, Mj Murphy MD

## 2020-09-23 NOTE — PROGRESS NOTES
I called the patient, told her MRI scan was essentially normal except for some minimal microvascular disease, most likely related to her headaches, she said thank you

## 2020-09-24 NOTE — PROGRESS NOTES
Patient had a normal EMG of both legs and left arm, showing no evidence of any neuromuscular disease. Patient had her MRI scan done and we are awaiting that. Patient is neuropsych testing is also being scheduled. We will follow-up after the above. We have not received her lab work from [facility] we will try to get that.

## 2020-09-26 DIAGNOSIS — R53.1 WEAKNESS GENERALIZED: ICD-10-CM

## 2020-09-26 DIAGNOSIS — I67.89 CEREBRAL MICROVASCULAR DISEASE: ICD-10-CM

## 2020-09-26 DIAGNOSIS — R55 CONVULSIVE SYNCOPE: ICD-10-CM

## 2020-09-26 DIAGNOSIS — R41.82 ACUTE ON CHRONIC ALTERATION IN MENTAL STATUS: ICD-10-CM

## 2020-09-26 DIAGNOSIS — R41.3 DISTURBANCE OF MEMORY: ICD-10-CM

## 2020-09-26 DIAGNOSIS — E53.8 B12 DEFICIENCY: ICD-10-CM

## 2020-09-26 DIAGNOSIS — G40.209 COMPLEX PARTIAL SEIZURES EVOLVING TO GENERALIZED TONIC-CLONIC SEIZURES (HCC): ICD-10-CM

## 2020-09-26 DIAGNOSIS — G73.7 METABOLIC MYOPATHY: ICD-10-CM

## 2020-09-26 DIAGNOSIS — I65.23 BILATERAL CAROTID ARTERY STENOSIS: ICD-10-CM

## 2020-09-26 DIAGNOSIS — E88.9 METABOLIC MYOPATHY: ICD-10-CM

## 2020-09-28 ENCOUNTER — TELEPHONE (OUTPATIENT)
Dept: NEUROLOGY | Age: 45
End: 2020-09-28

## 2020-09-28 NOTE — TELEPHONE ENCOUNTER
----- Message from Pastora Awan sent at 9/28/2020  9:49 AM EDT -----  Regarding: Dr. Herr/ telephone  Caller's first and last name and relationship to patient (if not the patient): self   Best contact number: [phone]   Preferred date and time: any date 10/7  Scheduled appointment date and time: 10/5  Reason for appointment: testing   Details to clarify the request: Pt will be out of town on original scheduled date. Needs to reschedule for a different date.

## 2020-10-12 ENCOUNTER — OFFICE VISIT (OUTPATIENT)
Dept: NEUROLOGY | Age: 45
End: 2020-10-12
Payer: MEDICAID

## 2020-10-12 DIAGNOSIS — F60.3 BORDERLINE PERSONALITY DISORDER IN ADULT (HCC): ICD-10-CM

## 2020-10-12 DIAGNOSIS — R41.81 AGE-RELATED COGNITIVE DECLINE: ICD-10-CM

## 2020-10-12 DIAGNOSIS — F31.32 BIPOLAR AFFECTIVE DISORDER, CURRENTLY DEPRESSED, MODERATE (HCC): Primary | ICD-10-CM

## 2020-10-12 PROCEDURE — 96133 NRPSYC TST EVAL PHYS/QHP EA: CPT | Performed by: PSYCHOLOGIST

## 2020-10-12 PROCEDURE — 96138 PSYCL/NRPSYC TECH 1ST: CPT | Performed by: PSYCHOLOGIST

## 2020-10-12 PROCEDURE — 96136 PSYCL/NRPSYC TST PHY/QHP 1ST: CPT | Performed by: PSYCHOLOGIST

## 2020-10-12 PROCEDURE — 96137 PSYCL/NRPSYC TST PHY/QHP EA: CPT | Performed by: PSYCHOLOGIST

## 2020-10-12 PROCEDURE — 96132 NRPSYC TST EVAL PHYS/QHP 1ST: CPT | Performed by: PSYCHOLOGIST

## 2020-10-12 PROCEDURE — 96139 PSYCL/NRPSYC TST TECH EA: CPT | Performed by: PSYCHOLOGIST
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
## 2020-11-30 ENCOUNTER — OFFICE VISIT (OUTPATIENT)
Dept: NEUROLOGY | Age: 45
End: 2020-11-30
Payer: MEDICAID

## 2020-11-30 VITALS
HEART RATE: 97 BPM | WEIGHT: 175 LBS | DIASTOLIC BLOOD PRESSURE: 70 MMHG | TEMPERATURE: 97.3 F | OXYGEN SATURATION: 97 % | HEIGHT: 66 IN | SYSTOLIC BLOOD PRESSURE: 120 MMHG | BODY MASS INDEX: 28.12 KG/M2

## 2020-11-30 DIAGNOSIS — R41.81 AGE-RELATED COGNITIVE DECLINE: ICD-10-CM

## 2020-11-30 DIAGNOSIS — F31.32 BIPOLAR AFFECTIVE DISORDER, CURRENTLY DEPRESSED, MODERATE (HCC): Primary | ICD-10-CM

## 2020-11-30 DIAGNOSIS — F60.3 BORDERLINE PERSONALITY DISORDER IN ADULT (HCC): ICD-10-CM

## 2020-11-30 DIAGNOSIS — R41.3 DISTURBANCE OF MEMORY: ICD-10-CM

## 2020-11-30 DIAGNOSIS — R68.89 SPELLS OF DECREASED ATTENTIVENESS: ICD-10-CM

## 2020-11-30 PROBLEM — D72.829 LEUKOCYTOSIS: Status: ACTIVE | Noted: 2020-10-02

## 2020-11-30 PROBLEM — R13.19 ESOPHAGEAL DYSPHAGIA: Status: ACTIVE | Noted: 2019-09-12

## 2020-11-30 PROBLEM — J44.9 CHRONIC OBSTRUCTIVE PULMONARY DISEASE (HCC): Status: ACTIVE | Noted: 2020-09-02

## 2020-11-30 PROBLEM — B19.20 UNSPECIFIED VIRAL HEPATITIS C WITHOUT HEPATIC COMA: Status: ACTIVE | Noted: 2019-09-12

## 2020-11-30 PROBLEM — R19.8 CHANGE IN BOWEL MOVEMENT: Status: ACTIVE | Noted: 2019-09-12

## 2020-11-30 PROBLEM — Z72.0 TOBACCO USER: Status: ACTIVE | Noted: 2020-09-02

## 2020-11-30 PROBLEM — Z12.11 ENCOUNTER FOR COLONOSCOPY DUE TO HISTORY OF ADENOMATOUS COLONIC POLYPS: Status: ACTIVE | Noted: 2020-08-13

## 2020-11-30 PROBLEM — K21.9 GASTRO-ESOPHAGEAL REFLUX DISEASE WITHOUT ESOPHAGITIS: Status: ACTIVE | Noted: 2020-11-30

## 2020-11-30 PROBLEM — R05.9 COUGH: Status: ACTIVE | Noted: 2020-11-30

## 2020-11-30 PROBLEM — R74.01 NONSPECIFIC ELEVATION OF LEVELS OF TRANSAMINASE OR LACTIC ACID DEHYDROGENASE (LDH): Status: ACTIVE | Noted: 2020-11-30

## 2020-11-30 PROBLEM — K04.7 PERIAPICAL ABSCESS WITHOUT SINUS: Status: ACTIVE | Noted: 2020-11-30

## 2020-11-30 PROBLEM — M25.551 PAIN OF BOTH HIP JOINTS: Status: ACTIVE | Noted: 2019-08-05

## 2020-11-30 PROBLEM — M81.0 AGE RELATED OSTEOPOROSIS: Status: ACTIVE | Noted: 2020-11-30

## 2020-11-30 PROBLEM — Z86.010 ENCOUNTER FOR COLONOSCOPY DUE TO HISTORY OF ADENOMATOUS COLONIC POLYPS: Status: ACTIVE | Noted: 2020-08-13

## 2020-11-30 PROBLEM — R74.02 NONSPECIFIC ELEVATION OF LEVELS OF TRANSAMINASE OR LACTIC ACID DEHYDROGENASE (LDH): Status: ACTIVE | Noted: 2020-11-30

## 2020-11-30 PROBLEM — N76.0 VAGINITIS AND VULVOVAGINITIS: Status: ACTIVE | Noted: 2020-11-30

## 2020-11-30 PROBLEM — M54.42 BILATERAL LOW BACK PAIN WITH LEFT-SIDED SCIATICA: Status: ACTIVE | Noted: 2020-11-30

## 2020-11-30 PROBLEM — M25.552 PAIN OF BOTH HIP JOINTS: Status: ACTIVE | Noted: 2019-08-05

## 2020-11-30 PROCEDURE — 99215 OFFICE O/P EST HI 40 MIN: CPT | Performed by: PSYCHIATRY & NEUROLOGY

## 2020-11-30 NOTE — PROGRESS NOTES
Michelle 82   FOLLOW UP IN OFFICE VISIT    April Zoey Hamilton is a 54 y.o. female who presents today for the following:  Chief Complaint   Patient presents with    Follow-up     no changes in memory, episode of staring has not occurred    Memory Loss           HPI  Historical Data  Patient is known to the practice and has been previously seen by Dr. Don Young    Neurologic diagnosis  Cognitive difficulties   Neuropsych testing completed October 12, 2020 by Dr. Terry Leal was not supportive of any significant cognitive process that is not accounted for by significant level of emotional distress that is currently being expressed at the time of the testing    Unsteady gait and generalized weakness    EMGs completed September 23, 2020: Unremarkable  Carotid duplex studies completed August 27, 2020:  Unremarkable    Lab work ordered August 27, 2020: Completed at [facility] results were normal  EEG 24 hour: Not yet completed as of November 30, 2020; scheduled for January 2021    Interim Data:     Patient states she still having problems with her memory  She had not yet gone over the results of the neuropsych testing with Dr. Terry Leal due to transportation issues  The results were reviewed at today's office visit and assured the patient that there is not a progressive neurologic deficit such as dementia developing nor is there issues of ADD being supported    Patient does attest to being under extreme stress as her boyfriend has stage IV colon cancer and is just starting to undergo treatment and she is also being followed by hematology for abnormal lab work    EEG is pending and is scheduled for November    We reviewed the results of the blood work we ordered that were completed at [facility]    Results of the EMG and carotid studies which were also unremarkable were reviewed at today's office visit          Allergies   Allergen Reactions    Oxycodone-Acetaminophen Hives     itching         Current Outpatient Medications   Medication Sig    albuterol (PROVENTIL HFA, VENTOLIN HFA, PROAIR HFA) 90 mcg/actuation inhaler 2 puffs as needed Inhalation every 4 hrs for 30 days    carBAMazepine (TEGretol) 200 mg tablet 1 tablet Orally Twice a day    citalopram (CELEXA) 40 mg tablet Take 40 mg by mouth daily.  esomeprazole (NEXIUM) 40 mg capsule take 1 capsule by mouth once daily Orally 1 time daily    loratadine (Claritin) 10 mg tablet 1 tablet Orally Once a day for 90 days    pantoprazole (PROTONIX) 40 mg tablet TAKE 1 TABLET BY MOUTH ONCE DAILY    ziprasidone (GEODON) 40 mg capsule Take 40 mg by mouth daily.  amitriptyline (ELAVIL) 150 mg tablet Take 150 mg by mouth At bedtime.  sennosides (Laxative, sennosides,) 25 mg tab Take 25 mg by mouth daily.  cyanocobalamin 1,000 mcg tablet Take 1,000 mcg by mouth daily.  cholecalciferol (Vitamin D3) (1000 Units /25 mcg) tablet Take  by mouth daily.  nicotine (Nicoderm CQ) 21 mg/24 hr 1 patch to skin Transdermal Once a day - remove patch at night for 42 days    cholecalciferol (VITAMIN D3) (5000 Units/125 mcg) tab tablet Take  by mouth daily.  clonazePAM (KLONOPIN) 0.5 mg tablet Take 0.5 mg by mouth three (3) times daily. No current facility-administered medications for this visit.         Past Medical History:   Diagnosis Date    ADHD     Bipolar disease, chronic (HCC)     Borderline personality disorder (HCC)     Hepatitis C     Liver disease     Hepatitis C: resolved       Past Surgical History:   Procedure Laterality Date    HX GYN      C Section x 3    HX GYN      D & C    HX GYN       x 3       Family History   Problem Relation Age of Onset    Heart Disease Mother     Diabetes Mother     Thyroid Disease Mother     Hypertension Mother     No Known Problems Father     Cancer Maternal Grandmother         kidney       Social History     Socioeconomic History    Marital status: SINGLE     Spouse name: Not on file    Number of children: Not on file    Years of education: Not on file    Highest education level: Not on file   Tobacco Use    Smoking status: Current Every Day Smoker     Packs/day: 1.00    Smokeless tobacco: Never Used   Substance and Sexual Activity    Alcohol use: Not Currently    Drug use: Never    Sexual activity: Not Currently         ROS  Other than what is stated above under HPI there is no new or changing issues related to the following:  Constitutional: No recent weight change, fever,fatigue, sleep difficulties, or loss of appetite. ENT/Mouth:  No hearing loss, ringing in the ears, chronic sinus problem, nose bleeds sore throat, voice change, hoarseness, swollen glands in neck, or difficulties with chewing and swallowing. Cardiovascular:  No chest pain/angina pectoris, palpitations,swelling of feet/ankles/hands, or calf pain while walking. Respiratory: No chronic or frequent coughs, spitting up blood, shortness of breath, asthma, or wheezing. Gastrointestinal: No abdominal pain, heartburn, nausea, vomiting, constipation, frequent diarrhea, rectal bleeding, or blood in stool. Genitourinary: No frequent urination, burning or painful urination, blood in urine, incontinence or dribbling. Musculoskeletal:   No joint pain, stiffness/swelling, weakness of muscles, muscle pain/cramp, or back pain. Integument:   No rash/itching, change in skin color, change in hair/nails, or change in color/size of moles. Neurological:  No dizziness/vertigo, loss of consciousness, numbness/tingling sensation, tremors, weakness in limbs, difficulty with balance, frequent or recurring headaches, memory loss or confusion. Psychiatric:   No nervousness, depression, hallucinations, paranoia or suspiciousness. Endocrine: No excessive thirst or urination, heat or cold intolerance. Hematologic/Lymphatic: No bleeding/bruising tendency, phlebitis, or past transfusion.        EXAMINATION  Visit Vitals  /70   Pulse 97 Temp 97.3 °F (36.3 °C)   Ht 5' 6\" (1.676 m)   Wt 175 lb (79.4 kg)   SpO2 97%   BMI 28.25 kg/m²            General appearance: Patient is well-developed and well-nourished in no apparent distress and well groomed. Psych/mental health:  Affect: Appropriate    PHQ  No flowsheet data found. HEENT:   Normocephalic  With evidence of trauma: No  Full range of motion head neck: Yes  Tenderness to palpation of the head neck region: No      Cardiovascular:   Extremities warm to touch: Yes   Extremity swelling: No  Discoloration: No  Evidence of PVD: No    Respiratory:   Dyspnea on exertion: No   Normal effort on casual observation: No   Use of portable oxygen: No   Evidence of cyanosis: No     Musculoskeletal:   Evidence of significant bone deformities: No   Spinal curvature: No     Integumentary:    Obvious bruising: No   Lacerations or discoloration on casual observation: No       Neurological Examination:   Mental Status:        MMSE  No flowsheet data found. Formal testing was not completed    there was nothing concerning on general observation and discussion.    Alert oriented and appropriate to general conversation  Normal processing on general observation  Followed conversation and responded seemingly appropriate throughout the office visit  No word finding difficulties noted on casual observation  Able to follow directions without difficulty     Cranial Nerves:    PERRLA,   EOMs intact gaze is conjugate  No nystagmus is appreciated  No SURESH  Facial motor and sensory intact bilaterally  Hearing intact to conversation  Voice with normal projection, no evidence of secretion pooling  Palate elevates symmetrically  Shoulder shrug intact bilaterally  No tongue deviation appreciated     Motor:   Normal tone and bulk  Fine dexterity intact bilaterally  No tremor appreciated on today's exam  No abnormal movements appreciated on today's exam    Muscle strength testing:  Right side  Upper extremities  Deltoid 5/5  Bicep 5/5  Tricep 5/5  Hand grasp 5/5    Lower extremity  Hip flexor 5/5  Extensor 5/5  Flexor 5/5  Plantar flexion 5/5  Dorsiflexion 5/5      Left side  Upper extremity  Deltoid 5/5  Bicep 5/5  Tricep 5/5  Hand grasp 5/5    Lower extremity  Hip flexor 5/5  Extensor 5/5  Flexor 5/5  Plantar flexion 5/5  Dorsiflexion 5/5    Sensation: Intact to light touch bilaterally    Coordination/Cerebellar:   Grossly intact    Gait: Ambulates independently    Fall risk assessment  No flowsheet data found. Reflexes: Not tested    ASSESSMENT AND PLAN    Patient is known to this practice. This is my first time seeing the patient. Chart and history reviewed in detail at today's office visit. Issues related to cognition  Neuropsych testing does not support any progressive degenerative disorder nor does it suggest any neurologic disorder. It is not supportive if ADHD and only supportive of some cognitive concerns consistent with her emotional distress   We talked today about being mindful   She is encouraged to continue to follow-up with her mental health team    Complaints of spells  Unclear etiology  EEG pending  However I suspect it is more related to her her mental health    Patient will return as needed only. However for EEG which is to be completed in January has any level of significant findings, then we will set up an appointment for her to follow-up otherwise we will see her back as needed    Patient is in agreement with the plan        ICD-10-CM ICD-9-CM    1. Bipolar affective disorder, currently depressed, moderate ([facility] [address]  F31.32 296.52    2. Borderline personality disorder in adult [facility])  F60.3 301.83    3. Age-related cognitive decline  R41.81 294.9    4. Disturbance of memory  R41.3 780.93    5. Spells of decreased attentiveness  R68.89 780.99            Additional pertinent medical data reviewed at today's office visit:       No visits with results within 3 Month(s) from this visit.    Latest known visit with results is:   Admission on 06/19/2017, Discharged on 06/19/2017   Component Date Value    WBC 06/19/2017 20.1*    RBC 06/19/2017 4.18     HGB 06/19/2017 13.1     HCT 06/19/2017 37.2     MCV 06/19/2017 89.0     MCH 06/19/2017 31.3     MCHC 06/19/2017 35.2     RDW 06/19/2017 12.8     PLATELET 63/37/7619 849     NEUTROPHILS 06/19/2017 69     BAND NEUTROPHILS 06/19/2017 3     LYMPHOCYTES 06/19/2017 16     MONOCYTES 06/19/2017 11     EOSINOPHILS 06/19/2017 0     BASOPHILS 06/19/2017 1     ABS. NEUTROPHILS 06/19/2017 14.5*    ABS. LYMPHOCYTES 06/19/2017 3.2     ABS. MONOCYTES 06/19/2017 2.2*    ABS. EOSINOPHILS 06/19/2017 0.0     ABS. BASOPHILS 06/19/2017 0.2*    DF 06/19/2017 MANUAL     PLATELET COMMENTS 91/82/2241 ADEQUATE PLATELETS     RBC COMMENTS 06/19/2017 NORMOCYTIC, NORMOCHROMIC     Sodium 06/19/2017 130*    Potassium 06/19/2017 2.9*    Chloride 06/19/2017 95*    CO2 06/19/2017 23     Anion gap 06/19/2017 12     Glucose 06/19/2017 155*    BUN 06/19/2017 4*    Creatinine 06/19/2017 1.13*    BUN/Creatinine ratio 06/19/2017 4*    GFR est AA 06/19/2017 >60     GFR est non-AA 06/19/2017 53*    Calcium 06/19/2017 8.4*    Bilirubin, total 06/19/2017 0.7     ALT (SGPT) 06/19/2017 26     AST (SGOT) 06/19/2017 15     Alk.  phosphatase 06/19/2017 105     Protein, total 06/19/2017 7.8     Albumin 06/19/2017 2.6*    Globulin 06/19/2017 5.2*    A-G Ratio 06/19/2017 0.5*    Color 06/19/2017 YELLOW/STRAW     Appearance 06/19/2017 CLEAR     Specific gravity 06/19/2017 1.010     pH (UA) 06/19/2017 6.5     Protein 06/19/2017 100*    Glucose 06/19/2017 NEGATIVE      Ketone 06/19/2017 NEGATIVE      Bilirubin 06/19/2017 NEGATIVE      Blood 06/19/2017 MODERATE*    Urobilinogen 06/19/2017 1.0     Nitrites 06/19/2017 NEGATIVE      Leukocyte Esterase 06/19/2017 SMALL*    WBC 06/19/2017      RBC 06/19/2017 10-20     Epithelial cells 06/19/2017 FEW     Bacteria 06/19/2017 1+*    Amorphous Crystals 06/19/2017 2+*    Lipase 06/19/2017 68*    Special Requests: 06/19/2017 NO SPECIAL REQUESTS     Aurora Count 06/19/2017                      Value:[id number]  COLONIES/mL      Culture result: 06/19/2017 ESCHERICHIA COLI*       XR Results (maximum last 3): Results from [place] encounter on 07/18/19   XR HIP RT W OR WO PELV 2-3 VWS    Impression IMPRESSION:  Bilateral hip dysplasia. XR HIP LT W OR WO PELV 2-3 VWS    Impression IMPRESSION:  Bilateral hip dysplasia. XR SPINE LUMB 2 OR 3 V    Impression IMPRESSION:  No acute fracture or dislocation. CT Results (maximum last 3): No results found for this or any previous visit. MRI Results (maximum last 3): Results from [facility] encounter on 09/03/20   MRI BRAIN W WO CONT    Impression IMPRESSION:  1. No acute findings or masses. 2. Mild nonspecific white matter disease. Follow-up and Dispositions    · Return if symptoms worsen or fail to improve.            Basim Robertson, MS, ANP-BC, [facility]

## 2020-11-30 NOTE — PATIENT INSTRUCTIONS
Your blood work that was ordered through our practice that you had completed at [facility] was normal 
 
Get your EEG completed and if that is normal we do not need to see you back however if there are some abnormalities that were concerned about we will set up an appointment for you to come back so we can further discuss it All of your other studies the EMG and the carotid duplex studies were all normal 
 
Set up my chart as this is the most efficient way to communicate with us In the meantime have a wonderful holiday season and happy new year Office Policies 
 
o Phone calls/patient messages: Please allow up to 24 hours for someone in the office to contact you about your call or message. Be mindful your provider may be out of the office or your message may require further review. We encourage you to use [email or web address] for your messages as this is a faster, more efficient way to communicate with our office 
 
o Medication Refills: 
Prescription medications require up to 48 business hours to process. We encourage you to use [email or web address] for your refills. For controlled medications: Please allow up to 72 business hours to process. Certain medications may require you to  a written prescription at our office. NO narcotic/controlled medications will be prescribed after 4pm Monday through Friday or on weekends 
 
o Form/Paperwork Completion: We ask that you allow 7-14 business days. You may also download your forms to [email or web address] to have your doctor print off. Learning About Mindfulness for Stress What are mindfulness and stress? Stress is what you feel when you have to handle more than you are used to. A lot of things can cause stress. You may feel stress when you go on a job interview, take a test, or run a race. This kind of short-term stress is normal and even useful. It can help you if you need to work hard or react quickly. Stress also can last a long time.  Long-term stress is caused by stressful situations or events. Examples of long-term stress include long-term health problems, ongoing problems at work, and conflicts in your family. Long-term stress can harm your health. Mindfulness is a focus only on things happening in the present moment. It's a process of purposefully paying attention to and being aware of your surroundings, your emotions, your thoughts, and how your body feels. You are aware of these things, but you aren't judging these experiences as \"good\" or \"bad. \" Mindfulness can help you learn to calm your mind and body to help you cope with illness, pain, and stress. How does mindfulness help to relieve stress? Mindfulness can help quiet your mind and relax your body. Studies show that it can help some people sleep better, feel less anxious, and bring their blood pressure down. And it's been shown to help some people live and cope better with certain health problems like heart disease, depression, chronic pain, and cancer. How do you practice mindfulness? To be mindful is to pay attention, to be present, and to be accepting. · When you're mindful, you do just one thing and you pay close attention to that one thing. For example, you may sit quietly and notice your emotions or how your food tastes and smells. · When you're present, you focus on the things that are happening right now. You let go of your thoughts about the past and the future. When you dwell on the past or the future, you miss moments that can heal and strengthen you. You may miss moments like hearing a child laugh or seeing a friendly face when you think you're all alone. · When you're accepting, you don't  the present moment. Instead you accept your thoughts and feelings as they come. You can practice anytime, anywhere, and in any way you choose. You can practice in many ways. Here are a few ideas: · While doing your chores, like washing the dishes, let your mind focus on what's in your hand. What does the dish feel like? Is the water warm or cold? · Go outside and take a few deep breaths. What is the air like? Is it warm or cold? · When you can, take some time at the start of your day to sit alone and think. · Take a slow walk by yourself. Count your steps while you breathe in and out. · Try yoga breathing exercises, stretches, and poses to strengthen and relax your muscles. · At work, if you can, try to stop for a few moments each hour. Note how your body feels. Let yourself regroup and let your mind settle before you return to what you were doing. · If you struggle with anxiety or \"worry thoughts,\" imagine your mind as a blue sri and your worry thoughts as clouds. Now imagine those worry thoughts floating across your mind's sri. Just let them pass by as you watch. Follow-up care is a key part of your treatment and safety. Be sure to make and go to all appointments, and call your doctor if you are having problems. It's also a good idea to know your test results and keep a list of the medicines you take. Where can you learn more? Go to http://www.gray.com/ Enter P331 in the search box to learn more about \"Learning About Mindfulness for Stress. \" Current as of: December 16, 2019               Content Version: 12.6 © 8471-9999 [employer]. Care instructions adapted under license by [employer] (which disclaims liability or warranty for this information). If you have questions about a medical condition or this instruction, always ask your healthcare professional. [address] any warranty or liability for your use of this information.

## 2020-12-21 ENCOUNTER — VIRTUAL VISIT (OUTPATIENT)
Dept: NEUROLOGY | Age: 45
End: 2020-12-21
Payer: MEDICAID

## 2020-12-21 DIAGNOSIS — F60.3 BORDERLINE PERSONALITY DISORDER (HCC): ICD-10-CM

## 2020-12-21 DIAGNOSIS — F31.31 BIPOLAR AFFECTIVE DISORDER, CURRENTLY DEPRESSED, MILD (HCC): Primary | ICD-10-CM

## 2020-12-21 PROCEDURE — 90832 PSYTX W PT 30 MINUTES: CPT | Performed by: PSYCHOLOGIST

## 2020-12-21 NOTE — PROGRESS NOTES
Pursuant to the emergency declaration under the [facility] waiver authority and the [employer] Act, this Virtual Visit was conducted, with appropriate consent obtained, to reduce the patient's risk of exposure to COVID-19 and provide continuity of care   Services were provided in this manner to substitute for in-person clinic visit. The originating site is the patient's home and the distance site is [employer]. These types of teleneuropsychology/telehealth/telemedicine visits were authorized by the President of the [place], though I/we cannot guarantee what a third party payor will do reimbursement/coverage wise. I indicated that I would evaluate the patient and recommend diagnostics and treatment based on my assessment and impressions, and that our sessions are not being recorded and that personal health information is protected to the best of our abilities. Jennifer Holt is a 54 y.o. female who presents today for feedback following recent neuropsychological testing. The results were reviewed of the recent neuropsychological evaluation, including discussing individual tests as well as the patient's areas of neurocognitive strength versus their weaknesses. Education was provided regarding my diagnostic impressions, and we discussed next steps for further evaluation down the road. I all also answered numerous questions related to the clinical findings, including discussing various methods to improve cognitive cognition and mood when relevant. The patient is encouraged to follow-up with the referring provider. DIAGNOSTIC IMPRESSIONS:    ICD-10-CM ICD-9-CM    1. Bipolar affective disorder, currently depressed, mild ([facility] [address]  F31.31 296.51    2.  Borderline personality disorder ([facility]  F60.3 301.83        [address] 30 minutes x 1    Emperatriz Medrano, PHD

## 2021-02-19 ENCOUNTER — TELEPHONE (OUTPATIENT)
Dept: NEUROLOGY | Age: 46
End: 2021-02-19

## 2021-02-19 NOTE — TELEPHONE ENCOUNTER
----- Message from Foster Foster sent at 2/19/2021  9:34 AM EST -----  Regarding: Dr. Arun Roque  General Message/Vendor Calls    Caller's first and last name: Pt      Reason for call: worsening symptoms and reschedule EEG      Callback required yes/no and why: Yes      Best contact number(s): [phone]       Details to clarify the request: Pt is calling in regards to her worsening symptoms. She believes she may have MS and would like to know how she can find out exactly if she does or not. She would also like a call back from the nurse to reschedule her EEG from January. Please advise.       Foster Foster

## 2022-03-18 PROBLEM — D72.829 LEUKOCYTOSIS: Status: ACTIVE | Noted: 2020-10-02

## 2022-03-18 PROBLEM — M25.552 PAIN OF BOTH HIP JOINTS: Status: ACTIVE | Noted: 2019-08-05

## 2022-03-18 PROBLEM — R13.19 ESOPHAGEAL DYSPHAGIA: Status: ACTIVE | Noted: 2019-09-12

## 2022-03-18 PROBLEM — M25.551 PAIN OF BOTH HIP JOINTS: Status: ACTIVE | Noted: 2019-08-05

## 2022-03-18 PROBLEM — K21.9 GASTRO-ESOPHAGEAL REFLUX DISEASE WITHOUT ESOPHAGITIS: Status: ACTIVE | Noted: 2020-11-30

## 2022-03-19 PROBLEM — Z86.010 ENCOUNTER FOR COLONOSCOPY DUE TO HISTORY OF ADENOMATOUS COLONIC POLYPS: Status: ACTIVE | Noted: 2020-08-13

## 2022-03-19 PROBLEM — R05.9 COUGH: Status: ACTIVE | Noted: 2020-11-30

## 2022-03-19 PROBLEM — R41.3 DISTURBANCE OF MEMORY: Status: ACTIVE | Noted: 2020-08-27

## 2022-03-19 PROBLEM — I67.89 CEREBRAL MICROVASCULAR DISEASE: Status: ACTIVE | Noted: 2020-08-27

## 2022-03-19 PROBLEM — R74.01 NONSPECIFIC ELEVATION OF LEVELS OF TRANSAMINASE OR LACTIC ACID DEHYDROGENASE (LDH): Status: ACTIVE | Noted: 2020-11-30

## 2022-03-19 PROBLEM — Z12.11 ENCOUNTER FOR COLONOSCOPY DUE TO HISTORY OF ADENOMATOUS COLONIC POLYPS: Status: ACTIVE | Noted: 2020-08-13

## 2022-03-19 PROBLEM — B19.20 UNSPECIFIED VIRAL HEPATITIS C WITHOUT HEPATIC COMA: Status: ACTIVE | Noted: 2019-09-12

## 2022-03-19 PROBLEM — R41.82 ACUTE ON CHRONIC ALTERATION IN MENTAL STATUS: Status: ACTIVE | Noted: 2020-08-27

## 2022-03-19 PROBLEM — M54.42 BILATERAL LOW BACK PAIN WITH LEFT-SIDED SCIATICA: Status: ACTIVE | Noted: 2020-11-30

## 2022-03-19 PROBLEM — R53.1 WEAKNESS GENERALIZED: Status: ACTIVE | Noted: 2020-08-27

## 2022-03-19 PROBLEM — R55 CONVULSIVE SYNCOPE: Status: ACTIVE | Noted: 2020-08-27

## 2022-03-19 PROBLEM — Z72.0 TOBACCO USER: Status: ACTIVE | Noted: 2020-09-02

## 2022-03-19 PROBLEM — R19.8 CHANGE IN BOWEL MOVEMENT: Status: ACTIVE | Noted: 2019-09-12

## 2022-03-19 PROBLEM — E53.8 B12 DEFICIENCY: Status: ACTIVE | Noted: 2020-08-27

## 2022-03-19 PROBLEM — R74.02 NONSPECIFIC ELEVATION OF LEVELS OF TRANSAMINASE OR LACTIC ACID DEHYDROGENASE (LDH): Status: ACTIVE | Noted: 2020-11-30

## 2022-03-19 PROBLEM — Z86.0101 ENCOUNTER FOR COLONOSCOPY DUE TO HISTORY OF ADENOMATOUS COLONIC POLYPS: Status: ACTIVE | Noted: 2020-08-13

## 2022-03-20 PROBLEM — K04.7 PERIAPICAL ABSCESS WITHOUT SINUS: Status: ACTIVE | Noted: 2020-11-30

## 2022-03-20 PROBLEM — E88.9 METABOLIC MYOPATHY: Status: ACTIVE | Noted: 2020-08-27

## 2022-03-20 PROBLEM — N76.0 VAGINITIS AND VULVOVAGINITIS: Status: ACTIVE | Noted: 2020-11-30

## 2022-03-20 PROBLEM — M81.0 AGE RELATED OSTEOPOROSIS: Status: ACTIVE | Noted: 2020-11-30

## 2022-03-20 PROBLEM — I65.23 BILATERAL CAROTID ARTERY STENOSIS: Status: ACTIVE | Noted: 2020-08-27

## 2022-03-20 PROBLEM — J44.9 CHRONIC OBSTRUCTIVE PULMONARY DISEASE (HCC): Status: ACTIVE | Noted: 2020-09-02

## 2022-03-20 PROBLEM — G73.7 METABOLIC MYOPATHY: Status: ACTIVE | Noted: 2020-08-27

## 2022-08-22 ENCOUNTER — HOSPITAL ENCOUNTER (INPATIENT)
Age: 47
LOS: 8 days | Discharge: HOME | DRG: 753 | End: 2022-08-30
Attending: EMERGENCY MEDICINE | Admitting: PSYCHIATRY & NEUROLOGY
Payer: MEDICAID

## 2022-08-22 DIAGNOSIS — F31.4 SEVERE DEPRESSED BIPOLAR I DISORDER WITHOUT PSYCHOTIC FEATURES (HCC): ICD-10-CM

## 2022-08-22 DIAGNOSIS — R03.0 ELEVATED BLOOD PRESSURE READING: ICD-10-CM

## 2022-08-22 DIAGNOSIS — E87.6 HYPOKALEMIA: ICD-10-CM

## 2022-08-22 DIAGNOSIS — F32.A DEPRESSION, UNSPECIFIED DEPRESSION TYPE: Primary | ICD-10-CM

## 2022-08-22 PROBLEM — F31.9 BIPOLAR 1 DISORDER (HCC): Status: ACTIVE | Noted: 2022-08-22

## 2022-08-22 LAB
ALBUMIN SERPL-MCNC: 3.7 G/DL (ref 3.5–5)
ALBUMIN/GLOB SERPL: 0.9 {RATIO} (ref 1.1–2.2)
ALP SERPL-CCNC: 105 U/L (ref 45–117)
ALT SERPL-CCNC: 23 U/L (ref 12–78)
AMPHET UR QL SCN: NEGATIVE
ANION GAP SERPL CALC-SCNC: 10 MMOL/L (ref 5–15)
APPEARANCE UR: CLEAR
AST SERPL-CCNC: 18 U/L (ref 15–37)
BARBITURATES UR QL SCN: NEGATIVE
BASOPHILS # BLD: 0 K/UL (ref 0–0.1)
BASOPHILS NFR BLD: 0 % (ref 0–1)
BENZODIAZ UR QL: NEGATIVE
BILIRUB SERPL-MCNC: 0.4 MG/DL (ref 0.2–1)
BILIRUB UR QL: NEGATIVE
BUN SERPL-MCNC: 4 MG/DL (ref 6–20)
BUN/CREAT SERPL: 6 (ref 12–20)
CALCIUM SERPL-MCNC: 9.2 MG/DL (ref 8.5–10.1)
CANNABINOIDS UR QL SCN: NEGATIVE
CHLORIDE SERPL-SCNC: 102 MMOL/L (ref 97–108)
CO2 SERPL-SCNC: 28 MMOL/L (ref 21–32)
COCAINE UR QL SCN: POSITIVE
COLOR UR: NORMAL
CREAT SERPL-MCNC: 0.71 MG/DL (ref 0.55–1.02)
DIFFERENTIAL METHOD BLD: NORMAL
DRUG SCRN COMMENT,DRGCM: ABNORMAL
EOSINOPHIL # BLD: 0.3 K/UL (ref 0–0.4)
EOSINOPHIL NFR BLD: 3 % (ref 0–7)
ERYTHROCYTE [DISTWIDTH] IN BLOOD BY AUTOMATED COUNT: 13 % (ref 11.5–14.5)
ETHANOL SERPL-MCNC: <10 MG/DL
FLUAV RNA SPEC QL NAA+PROBE: NOT DETECTED
FLUBV RNA SPEC QL NAA+PROBE: NOT DETECTED
GLOBULIN SER CALC-MCNC: 3.9 G/DL (ref 2–4)
GLUCOSE SERPL-MCNC: 101 MG/DL (ref 65–100)
GLUCOSE UR STRIP.AUTO-MCNC: NEGATIVE MG/DL
HCG UR QL: NEGATIVE
HCT VFR BLD AUTO: 41.6 % (ref 35–47)
HGB BLD-MCNC: 14.6 G/DL (ref 11.5–16)
HGB UR QL STRIP: NEGATIVE
IMM GRANULOCYTES # BLD AUTO: 0 K/UL (ref 0–0.04)
IMM GRANULOCYTES NFR BLD AUTO: 0 % (ref 0–0.5)
KETONES UR QL STRIP.AUTO: NEGATIVE MG/DL
LEUKOCYTE ESTERASE UR QL STRIP.AUTO: NEGATIVE
LYMPHOCYTES # BLD: 3.2 K/UL (ref 0.8–3.5)
LYMPHOCYTES NFR BLD: 34 % (ref 12–49)
MAGNESIUM SERPL-MCNC: 1.7 MG/DL (ref 1.6–2.4)
MCH RBC QN AUTO: 32 PG (ref 26–34)
MCHC RBC AUTO-ENTMCNC: 35.1 G/DL (ref 30–36.5)
MCV RBC AUTO: 91.2 FL (ref 80–99)
METHADONE UR QL: NEGATIVE
MONOCYTES # BLD: 0.5 K/UL (ref 0–1)
MONOCYTES NFR BLD: 5 % (ref 5–13)
NEUTS SEG # BLD: 5.5 K/UL (ref 1.8–8)
NEUTS SEG NFR BLD: 58 % (ref 32–75)
NITRITE UR QL STRIP.AUTO: NEGATIVE
NRBC # BLD: 0 K/UL (ref 0–0.01)
NRBC BLD-RTO: 0 PER 100 WBC
OPIATES UR QL: NEGATIVE
PCP UR QL: NEGATIVE
PH UR STRIP: 7 [PH] (ref 5–8)
PLATELET # BLD AUTO: 281 K/UL (ref 150–400)
PMV BLD AUTO: 9.4 FL (ref 8.9–12.9)
POTASSIUM SERPL-SCNC: 3 MMOL/L (ref 3.5–5.1)
PROT SERPL-MCNC: 7.6 G/DL (ref 6.4–8.2)
PROT UR STRIP-MCNC: NEGATIVE MG/DL
RBC # BLD AUTO: 4.56 M/UL (ref 3.8–5.2)
SARS-COV-2, COV2: NOT DETECTED
SODIUM SERPL-SCNC: 140 MMOL/L (ref 136–145)
SP GR UR REFRACTOMETRY: 1.01 (ref 1–1.03)
T4 FREE SERPL-MCNC: 0.8 NG/DL (ref 0.8–1.5)
TSH SERPL DL<=0.05 MIU/L-ACNC: 1.52 UIU/ML (ref 0.36–3.74)
UROBILINOGEN UR QL STRIP.AUTO: 0.2 EU/DL (ref 0.2–1)
WBC # BLD AUTO: 9.5 K/UL (ref 3.6–11)

## 2022-08-22 PROCEDURE — 81025 URINE PREGNANCY TEST: CPT

## 2022-08-22 PROCEDURE — 85025 COMPLETE CBC W/AUTO DIFF WBC: CPT

## 2022-08-22 PROCEDURE — 74011250637 HC RX REV CODE- 250/637: Performed by: EMERGENCY MEDICINE

## 2022-08-22 PROCEDURE — 99285 EMERGENCY DEPT VISIT HI MDM: CPT

## 2022-08-22 PROCEDURE — 81003 URINALYSIS AUTO W/O SCOPE: CPT

## 2022-08-22 PROCEDURE — 65220000003 HC RM SEMIPRIVATE PSYCH

## 2022-08-22 PROCEDURE — 84439 ASSAY OF FREE THYROXINE: CPT

## 2022-08-22 PROCEDURE — 83735 ASSAY OF MAGNESIUM: CPT

## 2022-08-22 PROCEDURE — 80307 DRUG TEST PRSMV CHEM ANLYZR: CPT

## 2022-08-22 PROCEDURE — 36415 COLL VENOUS BLD VENIPUNCTURE: CPT

## 2022-08-22 PROCEDURE — 80053 COMPREHEN METABOLIC PANEL: CPT

## 2022-08-22 PROCEDURE — 74011250637 HC RX REV CODE- 250/637: Performed by: INTERNAL MEDICINE

## 2022-08-22 PROCEDURE — 74011250637 HC RX REV CODE- 250/637: Performed by: PSYCHIATRY & NEUROLOGY

## 2022-08-22 PROCEDURE — 84443 ASSAY THYROID STIM HORMONE: CPT

## 2022-08-22 PROCEDURE — 87636 SARSCOV2 & INF A&B AMP PRB: CPT

## 2022-08-22 PROCEDURE — 82077 ASSAY SPEC XCP UR&BREATH IA: CPT

## 2022-08-22 RX ORDER — POTASSIUM CHLORIDE 750 MG/1
40 TABLET, FILM COATED, EXTENDED RELEASE ORAL
Status: COMPLETED | OUTPATIENT
Start: 2022-08-22 | End: 2022-08-22

## 2022-08-22 RX ORDER — ADHESIVE BANDAGE
30 BANDAGE TOPICAL DAILY PRN
Status: DISCONTINUED | OUTPATIENT
Start: 2022-08-22 | End: 2022-08-30 | Stop reason: HOSPADM

## 2022-08-22 RX ORDER — CARBAMAZEPINE 200 MG/1
200 TABLET ORAL 2 TIMES DAILY
Status: DISCONTINUED | OUTPATIENT
Start: 2022-08-22 | End: 2022-08-30 | Stop reason: HOSPADM

## 2022-08-22 RX ORDER — IBUPROFEN 200 MG
1 TABLET ORAL DAILY
Status: DISCONTINUED | OUTPATIENT
Start: 2022-08-23 | End: 2022-08-22

## 2022-08-22 RX ORDER — CITALOPRAM 20 MG/1
40 TABLET, FILM COATED ORAL DAILY
Status: DISCONTINUED | OUTPATIENT
Start: 2022-08-23 | End: 2022-08-30 | Stop reason: HOSPADM

## 2022-08-22 RX ORDER — IBUPROFEN 400 MG/1
400 TABLET ORAL
Status: DISCONTINUED | OUTPATIENT
Start: 2022-08-22 | End: 2022-08-30 | Stop reason: HOSPADM

## 2022-08-22 RX ORDER — MELATONIN
5000
Status: DISCONTINUED | OUTPATIENT
Start: 2022-08-22 | End: 2022-08-30 | Stop reason: HOSPADM

## 2022-08-22 RX ORDER — CLONAZEPAM 0.5 MG/1
0.5 TABLET ORAL
Status: DISCONTINUED | OUTPATIENT
Start: 2022-08-22 | End: 2022-08-23

## 2022-08-22 RX ORDER — IBUPROFEN 200 MG
1 TABLET ORAL DAILY PRN
Status: DISCONTINUED | OUTPATIENT
Start: 2022-08-22 | End: 2022-08-30 | Stop reason: HOSPADM

## 2022-08-22 RX ORDER — PANTOPRAZOLE SODIUM 40 MG/1
40 TABLET, DELAYED RELEASE ORAL
Status: DISCONTINUED | OUTPATIENT
Start: 2022-08-23 | End: 2022-08-30 | Stop reason: HOSPADM

## 2022-08-22 RX ORDER — TRAZODONE HYDROCHLORIDE 50 MG/1
50 TABLET ORAL
Status: DISCONTINUED | OUTPATIENT
Start: 2022-08-22 | End: 2022-08-23

## 2022-08-22 RX ORDER — HYDROXYZINE PAMOATE 50 MG/1
50 CAPSULE ORAL
Status: DISCONTINUED | OUTPATIENT
Start: 2022-08-22 | End: 2022-08-30 | Stop reason: HOSPADM

## 2022-08-22 RX ORDER — METRONIDAZOLE 500 MG/1
500 TABLET ORAL 2 TIMES DAILY
Status: COMPLETED | OUTPATIENT
Start: 2022-08-22 | End: 2022-08-29

## 2022-08-22 RX ORDER — AMITRIPTYLINE HYDROCHLORIDE 50 MG/1
150 TABLET, FILM COATED ORAL
Status: DISCONTINUED | OUTPATIENT
Start: 2022-08-22 | End: 2022-08-23

## 2022-08-22 RX ORDER — MAG HYDROX/ALUMINUM HYD/SIMETH 200-200-20
30 SUSPENSION, ORAL (FINAL DOSE FORM) ORAL
Status: DISCONTINUED | OUTPATIENT
Start: 2022-08-22 | End: 2022-08-30 | Stop reason: HOSPADM

## 2022-08-22 RX ADMIN — TRAZODONE HYDROCHLORIDE 50 MG: 50 TABLET ORAL at 21:06

## 2022-08-22 RX ADMIN — CLONAZEPAM 0.5 MG: 0.5 TABLET ORAL at 18:14

## 2022-08-22 RX ADMIN — Medication 5000 UNITS: at 18:13

## 2022-08-22 RX ADMIN — AMITRIPTYLINE HYDROCHLORIDE 150 MG: 50 TABLET, FILM COATED ORAL at 21:06

## 2022-08-22 RX ADMIN — CARBAMAZEPINE 200 MG: 200 TABLET ORAL at 18:14

## 2022-08-22 RX ADMIN — METRONIDAZOLE 500 MG: 500 TABLET ORAL at 18:13

## 2022-08-22 RX ADMIN — POTASSIUM CHLORIDE 40 MEQ: 750 TABLET, FILM COATED, EXTENDED RELEASE ORAL at 14:17

## 2022-08-22 NOTE — BH NOTES
Shift change report given to [facility] [place] re: SBAR, medications, and behavior.   Gustavo fall risk score:  1

## 2022-08-22 NOTE — ED NOTES
Pt speaking with Fabiola Tristan for [facility] at this time for evaluation of pt for care and potential placement.

## 2022-08-22 NOTE — BH NOTES
Admission Note:      Patient arrived on the unit at [phone] on a voluntarily admission from [facility] ED. Patient was escorted on the unit by [facility]. Dr. Farrah Dalton, Nursing Supervisor, and Hospitalist Dr. Mireille Pino notified of patient's arrival on the unit. Patient alert and oriented x 4. Patient depressed and very anxious. Patient denied having any license with the [address] [place] [address]. Patient smokes 1 pack of cigarettes per day. Patient was counseled on smoking cessation to include recognizing danger situations, coping skills, and information on quitting. Patient verbalized understanding. Nicotine patch 21 mg topically ordered daily PRN. Patient has no teeth or dentures but appetite good. Patient denied alcohol or drug use. Patient in no apparent distress all vital signs within normal limits.

## 2022-08-22 NOTE — ED NOTES
TRANSFER - OUT REPORT:    Verbal report given to [facility]. [facility] RN(name) on April Araceli Chong  being transferred to [facility] 236 / 1(unit) for routine progression of care       Report consisted of patients Situation, Background, Assessment and   Recommendations(SBAR). Information from the following report(s) SBAR, ED Summary, Intake/Output, MAR, Accordion, Recent Results, Med Rec Status and Quality Measures was reviewed with the receiving nurse. Lines:       Opportunity for questions and clarification was provided.       Patient to be transported with:  Security to unit with belongings

## 2022-08-22 NOTE — BH NOTES
[address] [facility] 60  Master Treatment Plan for April John Cola    Date Treatment Plan Initiated: 08/22/2022    Treatment Plan Modalities:  Type of Modality Amount  (x minutes) Frequency (x/week) Duration (x days) Name of Responsible Staff   [address] [place] [address] meetings to encourage peer interactions 30 14 1 Iain Yanez., T   Group psychotherapy to assist in building coping skills and internal controls 60 5 1 Annamaria Reich., TAYLORW  [facility] Faith., [facility]   Therapeutic activity groups to build coping skills 61 7 1 Az Higgins., T     Psychoeducation in group setting to address:   Medication education  Coping Skills  Symptom Management   60 7 1 Annamaria Reich., TAYLORW  Jesus Faith., [facility]  Garett Cortez., RN  Kendal Boeck, RN   Discharge planning   60 2 1 Judge Wing.,    Spirituality    60 2 1 Renée Garcia.   Physician medication management   15 7 1 ESTRADA Triana MD                                         Treatment Team Signatures    I have participated in the development of this plan of treatment and agree to its implementation.     Patient Signature       Patient Printed Name Date/Time   Social Work/Therapist Signature       Social Work/Therapist Printed Name Date/Time   RN Signature       RN Printed Name    Demarco Collazo Date/Time    08/22/2022  8227   Other Signature     Other Signature Date/Time   MD Signature       MD Printed Name Date/Time

## 2022-08-22 NOTE — ED PROVIDER NOTES
EMERGENCY DEPARTMENT HISTORY AND PHYSICAL EXAM          Date: 8/22/2022  Patient Name: Juanita Bowie    History of Presenting Illness     Chief Complaint   Patient presents with    Mental Health Problem       History Provided By: Patient    HPI: Jennifer Sims is a 49 y.o. female, pmhx bipolar disorder, borderline personality, hepatitis C and ADHD, who presents ambulatory to the ED c/o suicidal ideation    Patient explains that her significant other passed away in May after being on hospice and she has progressively worsened since that time. She admits she has not been on her medications for months. She states she is not eating or sleeping and she is lost 30 pounds. She states the suicidal ideation is new recently. She has not tried to hurt her self. She denies any fevers, chills, coughing, shortness of breath, abdominal pain, nausea, vomiting and diarrhea. PCP: Zen Aguirre MD    Allergies: Oxycodone  Social Hx: +tobacco, -vaping    There are no other complaints, changes, or physical findings at this time. Current Facility-Administered Medications   Medication Dose Route Frequency Provider Last Rate Last Admin    potassium chloride SR (KLOR-CON 10) tablet 40 mEq  40 mEq Oral NOW TermeerPaige MD         Current Outpatient Medications   Medication Sig Dispense Refill    albuterol (PROVENTIL HFA, VENTOLIN HFA, PROAIR HFA) 90 mcg/actuation inhaler 2 puffs as needed Inhalation every 4 hrs for 30 days      carBAMazepine (TEGretol) 200 mg tablet 1 tablet Orally Twice a day      citalopram (CELEXA) 40 mg tablet Take 40 mg by mouth daily. esomeprazole (NEXIUM) 40 mg capsule take 1 capsule by mouth once daily Orally 1 time daily      loratadine (Claritin) 10 mg tablet 1 tablet Orally Once a day for 90 days      pantoprazole (PROTONIX) 40 mg tablet TAKE 1 TABLET BY MOUTH ONCE DAILY      ziprasidone (GEODON) 40 mg capsule Take 40 mg by mouth daily.       amitriptyline (ELAVIL) 150 mg tablet Take 150 mg by mouth At bedtime. sennosides (Laxative, sennosides,) 25 mg tab Take 25 mg by mouth daily. cyanocobalamin 1,000 mcg tablet Take 1,000 mcg by mouth daily. cholecalciferol (Vitamin D3) (1000 Units /25 mcg) tablet Take  by mouth daily. nicotine (Nicoderm CQ) 21 mg/24 hr 1 patch to skin Transdermal Once a day - remove patch at night for 42 days      cholecalciferol (VITAMIN D3) (5000 Units/125 mcg) tab tablet Take  by mouth daily. clonazePAM (KLONOPIN) 0.5 mg tablet Take 0.5 mg by mouth three (3) times daily. Past History     Past Medical History:  Past Medical History:   Diagnosis Date    ADHD     Bipolar disease, chronic (HCC)     Borderline personality disorder ([facility] [address]     Hepatitis C     Liver disease     Hepatitis C: resolved       Past Surgical History:  Past Surgical History:   Procedure Laterality Date    HX GYN      C Section x 3    HX GYN      D & C    HX GYN       x 3       Family History:  Family History   Problem Relation Age of Onset    Heart Disease Mother     Diabetes Mother     Thyroid Disease Mother     Hypertension Mother     No Known Problems Father     Cancer Maternal Grandmother         kidney       Social History:  Social History     Tobacco Use    Smoking status: Every Day     Packs/day: 1.00     Types: Cigarettes    Smokeless tobacco: Never   Substance Use Topics    Alcohol use: Not Currently    Drug use: Never       Allergies: Allergies   Allergen Reactions    Oxycodone-Acetaminophen Hives     itching           Review of Systems   Review of Systems   Constitutional:  Positive for activity change, appetite change and unexpected weight change. Negative for chills and fever. HENT:  Negative for congestion. Eyes:  Negative for pain and visual disturbance. Respiratory:  Negative for cough and shortness of breath. Cardiovascular:  Negative for chest pain. Gastrointestinal:  Negative for abdominal pain, diarrhea, nausea and vomiting. Genitourinary:  Negative for dysuria. Musculoskeletal:  Negative for back pain. Skin:  Negative for rash. Neurological:  Positive for weakness (Sometimes when she walks she feels her legs are giving out on her). Negative for headaches. Psychiatric/Behavioral:  Positive for sleep disturbance and suicidal ideas. Negative for self-injury. Physical Exam   Physical Exam  Vitals and nursing note reviewed. Constitutional:       Appearance: She is well-developed. She is not diaphoretic. Comments: Disheveled middle-aged female with elevated blood pressure, intermittently tearful, in mild acute distress   HENT:      Head: Normocephalic and atraumatic. Eyes:      General:         Right eye: No discharge. Left eye: No discharge. Conjunctiva/sclera: Conjunctivae normal.      Pupils: Pupils are equal, round, and reactive to light. Cardiovascular:      Rate and Rhythm: Normal rate and regular rhythm. Heart sounds: Normal heart sounds. No murmur heard. No friction rub. No gallop. Pulmonary:      Effort: Pulmonary effort is normal. No respiratory distress. Breath sounds: Normal breath sounds. No wheezing, rhonchi or rales. Chest:      Chest wall: No tenderness. Abdominal:      General: Bowel sounds are normal. There is no distension. Palpations: Abdomen is soft. Tenderness: There is no abdominal tenderness. There is no guarding or rebound. Comments: Abdomen is protuberant but soft   Musculoskeletal:         General: Normal range of motion. Cervical back: Normal range of motion and neck supple. No rigidity. Lymphadenopathy:      Cervical: No cervical adenopathy. Skin:     General: Skin is warm and dry. Findings: No rash. Neurological:      Mental Status: She is alert and oriented to person, place, and time. Cranial Nerves: No cranial nerve deficit. Motor: No abnormal muscle tone.    Psychiatric:         Attention and Perception: She is attentive. Mood and Affect: Mood is not anxious. Affect is tearful. Speech: Speech normal.         Behavior: Behavior normal.     Diagnostic Study Results     Labs -     Recent Results (from the past 12 hour(s))   DRUG SCREEN, URINE    Collection Time: 08/22/22 12:56 PM   Result Value Ref Range    AMPHETAMINES Negative NEG      BARBITURATES Negative NEG      BENZODIAZEPINES Negative NEG      COCAINE Positive (A) NEG      METHADONE Negative NEG      OPIATES Negative NEG      PCP(PHENCYCLIDINE) Negative NEG      THC (TH-CANNABINOL) Negative NEG      Drug screen comment (NOTE)    HCG URINE, QL    Collection Time: 08/22/22 12:56 PM   Result Value Ref Range    HCG urine, QL Negative NEG     CBC WITH AUTOMATED DIFF    Collection Time: 08/22/22  1:10 PM   Result Value Ref Range    WBC 9.5 3.6 - 11.0 K/uL    RBC 4.56 3.80 - 5.20 M/uL    HGB 14.6 11.5 - 16.0 g/dL    HCT 41.6 35.0 - 47.0 %    MCV 91.2 80.0 - 99.0 FL    MCH 32.0 26.0 - 34.0 PG    MCHC 35.1 30.0 - 36.5 g/dL    RDW 13.0 11.5 - 14.5 %    PLATELET 111 876 - 140 K/uL    MPV 9.4 8.9 - 12.9 FL    NRBC 0.0 0  WBC    ABSOLUTE NRBC 0.00 0.00 - 0.01 K/uL    NEUTROPHILS 58 32 - 75 %    LYMPHOCYTES 34 12 - 49 %    MONOCYTES 5 5 - 13 %    EOSINOPHILS 3 0 - 7 %    BASOPHILS 0 0 - 1 %    IMMATURE GRANULOCYTES 0 0.0 - 0.5 %    ABS. NEUTROPHILS 5.5 1.8 - 8.0 K/UL    ABS. LYMPHOCYTES 3.2 0.8 - 3.5 K/UL    ABS. MONOCYTES 0.5 0.0 - 1.0 K/UL    ABS. EOSINOPHILS 0.3 0.0 - 0.4 K/UL    ABS. BASOPHILS 0.0 0.0 - 0.1 K/UL    ABS. IMM.  GRANS. 0.0 0.00 - 0.04 K/UL    DF AUTOMATED     METABOLIC PANEL, COMPREHENSIVE    Collection Time: 08/22/22  1:10 PM   Result Value Ref Range    Sodium 140 136 - 145 mmol/L    Potassium 3.0 (L) 3.5 - 5.1 mmol/L    Chloride 102 97 - 108 mmol/L    CO2 28 21 - 32 mmol/L    Anion gap 10 5 - 15 mmol/L    Glucose 101 (H) 65 - 100 mg/dL    BUN 4 (L) 6 - 20 MG/DL    Creatinine 0.71 0.55 - 1.02 MG/DL    BUN/Creatinine ratio 6 (L) 12 - 20 GFR est AA >60 >60 ml/min/1.73m2    GFR est non-AA >60 >60 ml/min/1.73m2    Calcium 9.2 8.5 - 10.1 MG/DL    Bilirubin, total 0.4 0.2 - 1.0 MG/DL    ALT (SGPT) 23 12 - 78 U/L    AST (SGOT) 18 15 - 37 U/L    Alk. phosphatase 105 45 - 117 U/L    Protein, total 7.6 6.4 - 8.2 g/dL    Albumin 3.7 3.5 - 5.0 g/dL    Globulin 3.9 2.0 - 4.0 g/dL    A-G Ratio 0.9 (L) 1.1 - 2.2     MAGNESIUM    Collection Time: 08/22/22  1:10 PM   Result Value Ref Range    Magnesium 1.7 1.6 - 2.4 mg/dL   ETHYL ALCOHOL    Collection Time: 08/22/22  1:10 PM   Result Value Ref Range    ALCOHOL(ETHYL),SERUM <10 <10 MG/DL   TSH 3RD GENERATION    Collection Time: 08/22/22  1:10 PM   Result Value Ref Range    TSH 1.52 0.36 - 3.74 uIU/mL       Radiologic Studies -   No orders to display     CT Results  (Last 48 hours)      None          CXR Results  (Last 48 hours)      None              Medical Decision Making   I am the first provider for this patient. I reviewed the vital signs, available nursing notes, past medical history, past surgical history, family history and social history. Vital Signs-Reviewed the patient's vital signs. Patient Vitals for the past 12 hrs:   Temp Pulse Resp BP SpO2   08/22/22 1351 -- 74 18 (!) 172/97 98 %   08/22/22 1337 -- 80 18 -- 100 %   08/22/22 1300 -- -- -- -- 100 %   08/22/22 1247 98.2 °F (36.8 °C) 78 18 (!) 176/86 100 %       Pulse Oximetry Analysis - 100% on RA    Records Reviewed: Nursing Notes and Old Medical Records    Provider Notes (Medical Decision Making):   MDM: Middle-aged female with psychiatric history off of her medications with suicidal ideation without a plan. Currently hemodynamically stable without concern for sepsis, COVID, pneumonia, intracranial pathology causing change in behavior. My perspective patient is medically cleared. ED Course:   Initial assessment performed.  The patients presenting problems have been discussed, and they are in agreement with the care plan formulated and outlined with them. I have encouraged them to ask questions as they arise throughout their visit. PROGRESS NOTE:  1:15pm  Discussed case with be smart . She does feel patient has risk to herself and possibly others. Patient is willing to be a voluntary mental health admission. Await labs for total clearance. Critical Care Time:   0      Diagnosis     Clinical Impression:   1. Depression, unspecified depression type    2. Elevated blood pressure reading    3. Hypokalemia        PLAN:  Mental health admission      Please note, this dictation was completed with Networker, the computer voice recognition software. Quite often unanticipated grammatical, syntax, homophones, and other interpretive errors are inadvertently transcribed by the computer software. Please disregard these errors. Please excuse any errors that have escaped final proof reading.

## 2022-08-22 NOTE — DISCHARGE INSTRUCTIONS
BEHAVIORAL HEALTH NURSING DISCHARGE NOTE      The following personal items collected during your admission are returned to you:   Dental Appliance: Dental Appliances: None  Vision: Visual Aid: With patient, Glasses  Hearing Aid:    Jewelry: Jewelry: None  Clothing: Clothing: Undergarments, Shirt, Pants, Pajamas, Shorts, With patient  Other Valuables: Other Valuables: Cell Phone, Personal toiletries, Wallet, Lighter/matches, Eyeglasses  Valuables sent to safe:        PATIENT INSTRUCTIONS:    What to do at Home:    You may resume normal activities until. Avoid making any critical decisions for at least 24-hours. Recommended diet: Regular Diet. Recommended activity: Activity as tolerated. National Suicide Prevention Line: 0-829-231-417-938-6066. [place] [facility] St[facility] 5-614-987-788-315-9640. Smoking Cessation Hotline 7-370-QUIT NOW ( 628-1045). If you have problems relating to your recovery, call your physician. The discharge information has been reviewed with the patient. The patient verbalized understanding.
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
## 2022-08-22 NOTE — ED NOTES
This ED not yet contacted by bedboard but Behavioral Health Bed has been assigned, Unit called, to call back when ready for report.

## 2022-08-22 NOTE — PROGRESS NOTES
Problem: Falls - Risk of  Goal: *Absence of Falls  Description: Document Ej Dean Fall Risk and appropriate interventions in the flowsheet.   Outcome: Progressing Towards Goal  Note: Fall Risk Interventions:            Medication Interventions: Teach patient to arise slowly                   Problem: Anxiety-Behavioral Health (Adult/Pediatric)  Goal: *STG: Remains safe in hospital  Outcome: Progressing Towards Goal  Goal: *STG: Attends activities and groups  Outcome: Progressing Towards Goal  Goal: *STG/LTG: Complies with medication therapy  Outcome: Progressing Towards Goal     Problem: Depressed Mood (Adult/Pediatric)  Goal: *STG: Attends activities and groups  Outcome: Progressing Towards Goal  Goal: *STG: Remains safe in hospital  Outcome: Progressing Towards Goal  Goal: *STG: Complies with medication therapy  Outcome: Progressing Towards Goal

## 2022-08-22 NOTE — BSMART NOTE
BSMART assessment completed, and suicide risk level noted to be high. Primary Nurse, Helene Hernandez and Charge Nurse, Ap  and Physician, Dr. Cooper Henderson notified. Concerns observed by SI w/ planning and intent. Security/Off- has not been notified.

## 2022-08-22 NOTE — ROUTINE PROCESS
Shift change report given to Mearl Riedel, re: MACAR. Medications, and behavior.   Gustavo Fall Risk Score (1)

## 2022-08-22 NOTE — BH NOTES
TRANSFER - IN REPORT:    Verbal report received from Jessica Marino RN on April L Moraima Orn  being received from [facility] ED for routine progression of care      Report consisted of patients Situation, Background, Assessment and   Recommendations(SBAR). Information from the following report(s) SBAR, Kardex, ED Summary, MAR, and Recent Results was reviewed with the receiving nurse. Lines:    Opportunity for questions and clarification was provided. Assessment completed upon patients arrival to unit and care assumed. Patient arrived on the unit at 1607 via wheel. Patient escort on the unit by [facility].
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
## 2022-08-22 NOTE — ED NOTES
Pt being taken to [facility] at this time by security.

## 2022-08-22 NOTE — ED NOTES
BSMART console set up at bedside for pt. This RN performing 15 minute checks and observation of the pt at this time.

## 2022-08-22 NOTE — H&P
Is      [facility]   Admission History & Physical        8/22/2022 4:23 PM  Patient: Jennifer Mcwilliams 1975  PCP: Camille Cowan MD    HISTORY  Chief Complaint:   Chief Complaint   Patient presents with    Mental Health Problem     Patient is being seen today for  medical H&P as she is being admitted to a psych unit. Subjective:    She reports concern over being suicidal at home and over the last few weeks to months. She has, several other issues including partially treated trichomonas infection, concern over being malnourished and 30 pound weight loss. HPI: 49 y.o. female presenting for admission to [facility] for/due to bipolar illness and multiple episodes of suicidal ideation over the last week. She  has a past medical history of ADHD, Bipolar disease, chronic ([address] Borderline personality disorder ([address] Hepatitis C, and Liver disease. Maykel Sylvester She was evaluated in the ED today and agreed to voluntary consents to inpatient care when she realized she might hurt somebody else as one of her mechanisms were to be running her car into someone else's car. She has had multiple suicide attempts over the last 3 months since her boyfriend passed away 3 months ago after she had been living with him for 6 years as a primary caregiver as he was receiving hospice services. After her boyfriend passed, his family forced her out of the house and she has been staying in a trailer without running water since 8/18/2022. Several attempts at suicide have involved taking multiple sleeping pills. One of the times was discovered by her mom who did not want to contact anybody because \"did not want to get her into trouble\". She has had multiple previous psychiatric admissions. She was seen by behavioral health counselor in the ED and admission advised as suicide risk deemed to be high.       She reports a 30 pound weight loss over the last 3 months which she thinks is consistent with the recent death of her boyfriend and the other psychosocial issues which came along with that. She does not think she is eating as well. She saw a gynecologist recently for follow-up of abnormal Pap smear from several years ago and has been treated twice for trichomonas still not entirely gone. Past Medical History:  Past Medical History:   Diagnosis Date    ADHD     Bipolar disease, chronic (HCC)     Borderline personality disorder ([facility]     Hepatitis C     Liver disease     Hepatitis C: resolved       Past Surgical History:  Past Surgical History:   Procedure Laterality Date    HX GYN      C Section x 3    HX GYN      D & C    HX GYN       x 3       Medication:  Prior to Admission medications    Medication Sig Start Date End Date Taking? Authorizing Provider   albuterol (PROVENTIL HFA, VENTOLIN HFA, PROAIR HFA) 90 mcg/actuation inhaler 2 puffs as needed Inhalation every 4 hrs for 30 days 3/7/16   Provider, Historical   carBAMazepine (TEGretol) 200 mg tablet 1 tablet Orally Twice a day 19   Provider, Historical   citalopram (CELEXA) 40 mg tablet Take 40 mg by mouth daily. 19   Provider, Historical   esomeprazole (NEXIUM) 40 mg capsule take 1 capsule by mouth once daily Orally 1 time daily    Provider, Historical   loratadine (Claritin) 10 mg tablet 1 tablet Orally Once a day for 90 days 19   Provider, Historical   pantoprazole (PROTONIX) 40 mg tablet TAKE 1 TABLET BY MOUTH ONCE DAILY 20   Provider, Historical   ziprasidone (GEODON) 40 mg capsule Take 40 mg by mouth daily. 20   Provider, Historical   amitriptyline (ELAVIL) 150 mg tablet Take 150 mg by mouth At bedtime. 19   Provider, Historical   sennosides (Laxative, sennosides,) 25 mg tab Take 25 mg by mouth daily. Provider, Historical   cyanocobalamin 1,000 mcg tablet Take 1,000 mcg by mouth daily. Provider, Historical   cholecalciferol (Vitamin D3) (1000 Units /25 mcg) tablet Take  by mouth daily.     Provider, Historical nicotine (Nicoderm CQ) 21 mg/24 hr 1 patch to skin Transdermal Once a day - remove patch at night for 42 days 3/24/16   Provider, Historical   cholecalciferol (VITAMIN D3) (5000 Units/125 mcg) tab tablet Take  by mouth daily. Provider, Historical   clonazePAM (KLONOPIN) 0.5 mg tablet Take 0.5 mg by mouth three (3) times daily. Provider, Historical       Allergies: Allergies   Allergen Reactions    Oxycodone-Acetaminophen Hives     itching         Social History:  Social History     Tobacco Use    Smoking status: Every Day     Packs/day: 1.00     Types: Cigarettes    Smokeless tobacco: Never   Substance Use Topics    Alcohol use: Not Currently    Drug use: Never       Family History:  Family History   Problem Relation Age of Onset    Heart Disease Mother     Diabetes Mother     Thyroid Disease Mother     Hypertension Mother     No Known Problems Father     Cancer Maternal Grandmother         kidney       ROS:    General:  fever, chills, sweats, generalized weakness, she has had decreased weight and appetite loss.   Eyes:    blurred vision, eye pain, loss of vision, double vision  ENT:    rhinorrhea, pharyngitis   Respiratory:  cough, sputum production, SOB, LERMA, wheezing, pleuritic pain   Cardiology:   chest pain, palpitations, orthopnea, PND, edema, syncope   Gastrointestinal:  abdominal pain , N/V, diarrhea, dysphagia, constipation, bleeding   Genitourinary:  frequency, urgency, dysuria, hematuria, incontinence, prostatism   Muskuloskeletal: arthralgia, myalgia, back pain  Hematology:   easy bruising, nose or gum bleeding, lymphadenopathy   Dermatological: rash, ulceration, pruritis, color change / jaundice  Endocrine:   hot flashes or polydipsia   Neurological:  headache, dizziness, confusion, focal weakness, paresthesia, speech difficulties, memory loss, gait difficulty  Psychological: She has more than the usual feelings of anxiety, depression, agitation      PHYSICAL EXAM:    Patient's vitals are reviewed:  General:    Alert, cooperative, no distress, appears stated age. Nontoxic, interactive looks thin and under nourished  HEENT: Atraumatic, anicteric sclerae, pink conjunctivae, no photophobia     No oral ulcers, mucosa moist, throat clear, dentition fair, throat without exudate  Neck:  Supple, symmetrical;   thyroid non tender  Lungs:   Clear to auscultation bilaterally. No wheezing or rhonchi. No rales. Air movement is good  Chest wall:  No tenderness. No accessory muscle use. Heart:   Regular rhythm. No  murmur. No edema PMI within normal limits  Abdomen:   Soft, non-tender. Not distended. Bowel sounds normal  Extremities: No cyanosis. No clubbing      Capillary refill normal,  Radial pulse 2+,    Skin:     Not pale. Not jaundiced. No rashes   Psych:  Good insight. Not depressed. Not anxious or agitated. Neurologic: Alert oriented x3. Cranial nerves II through XII intact. Motor within normal limits, sensory within normal limits.   Cerebellar grossly intact     Lab Data Reviewed:    Recent Results (from the past 24 hour(s))   URINALYSIS W/ RFLX MICROSCOPIC    Collection Time: 08/22/22 12:56 PM   Result Value Ref Range    Color YELLOW/STRAW      Appearance CLEAR CLEAR      Specific gravity 1.006 1.003 - 1.030      pH (UA) 7.0 5.0 - 8.0      Protein Negative NEG mg/dL    Glucose Negative NEG mg/dL    Ketone Negative NEG mg/dL    Bilirubin Negative NEG      Blood Negative NEG      Urobilinogen 0.2 0.2 - 1.0 EU/dL    Nitrites Negative NEG      Leukocyte Esterase Negative NEG     DRUG SCREEN, URINE    Collection Time: 08/22/22 12:56 PM   Result Value Ref Range    AMPHETAMINES Negative NEG      BARBITURATES Negative NEG      BENZODIAZEPINES Negative NEG      COCAINE Positive (A) NEG      METHADONE Negative NEG      OPIATES Negative NEG      PCP(PHENCYCLIDINE) Negative NEG      THC (TH-CANNABINOL) Negative NEG      Drug screen comment (NOTE)    HCG URINE, QL    Collection Time: 08/22/22 12:56 PM Result Value Ref Range    HCG urine, QL Negative NEG     CBC WITH AUTOMATED DIFF    Collection Time: 08/22/22  1:10 PM   Result Value Ref Range    WBC 9.5 3.6 - 11.0 K/uL    RBC 4.56 3.80 - 5.20 M/uL    HGB 14.6 11.5 - 16.0 g/dL    HCT 41.6 35.0 - 47.0 %    MCV 91.2 80.0 - 99.0 FL    MCH 32.0 26.0 - 34.0 PG    MCHC 35.1 30.0 - 36.5 g/dL    RDW 13.0 11.5 - 14.5 %    PLATELET 003 617 - 286 K/uL    MPV 9.4 8.9 - 12.9 FL    NRBC 0.0 0  WBC    ABSOLUTE NRBC 0.00 0.00 - 0.01 K/uL    NEUTROPHILS 58 32 - 75 %    LYMPHOCYTES 34 12 - 49 %    MONOCYTES 5 5 - 13 %    EOSINOPHILS 3 0 - 7 %    BASOPHILS 0 0 - 1 %    IMMATURE GRANULOCYTES 0 0.0 - 0.5 %    ABS. NEUTROPHILS 5.5 1.8 - 8.0 K/UL    ABS. LYMPHOCYTES 3.2 0.8 - 3.5 K/UL    ABS. MONOCYTES 0.5 0.0 - 1.0 K/UL    ABS. EOSINOPHILS 0.3 0.0 - 0.4 K/UL    ABS. BASOPHILS 0.0 0.0 - 0.1 K/UL    ABS. IMM. GRANS. 0.0 0.00 - 0.04 K/UL    DF AUTOMATED     METABOLIC PANEL, COMPREHENSIVE    Collection Time: 08/22/22  1:10 PM   Result Value Ref Range    Sodium 140 136 - 145 mmol/L    Potassium 3.0 (L) 3.5 - 5.1 mmol/L    Chloride 102 97 - 108 mmol/L    CO2 28 21 - 32 mmol/L    Anion gap 10 5 - 15 mmol/L    Glucose 101 (H) 65 - 100 mg/dL    BUN 4 (L) 6 - 20 MG/DL    Creatinine 0.71 0.55 - 1.02 MG/DL    BUN/Creatinine ratio 6 (L) 12 - 20      GFR est AA >60 >60 ml/min/1.73m2    GFR est non-AA >60 >60 ml/min/1.73m2    Calcium 9.2 8.5 - 10.1 MG/DL    Bilirubin, total 0.4 0.2 - 1.0 MG/DL    ALT (SGPT) 23 12 - 78 U/L    AST (SGOT) 18 15 - 37 U/L    Alk.  phosphatase 105 45 - 117 U/L    Protein, total 7.6 6.4 - 8.2 g/dL    Albumin 3.7 3.5 - 5.0 g/dL    Globulin 3.9 2.0 - 4.0 g/dL    A-G Ratio 0.9 (L) 1.1 - 2.2     MAGNESIUM    Collection Time: 08/22/22  1:10 PM   Result Value Ref Range    Magnesium 1.7 1.6 - 2.4 mg/dL   ETHYL ALCOHOL    Collection Time: 08/22/22  1:10 PM   Result Value Ref Range    ALCOHOL(ETHYL),SERUM <10 <10 MG/DL   TSH 3RD GENERATION    Collection Time: 08/22/22  1:10 PM   Result Value Ref Range    TSH 1.52 0.36 - 3.74 uIU/mL   COVID-19 WITH INFLUENZA A/B    Collection Time: 08/22/22  2:19 PM   Result Value Ref Range    SARS-CoV-2 by PCR Not detected NOTD      Influenza A by PCR Not detected NOTD      Influenza B by PCR Not detected NOTD         EKG (my personal reading) none available          Care Plan discussed with:   Patient x    Family     RN x         Consultant      Expected  Disposition:   Home with Family x   HH/PT/OT/RN    SNF/LTC    CHADD          Comments    x Reviewed previous records    x Discussion with patient and/or family and questions answered       _______________________________________________________          My assessment of this patient's clinical condition and my plan of care is as follows. ASSESSMENT / PLAN    Active Problems:    Bipolar 1 disorder ([address] (8/22/2022)    Suicide ideation  She had considered running her car into other cars but did not do so concern of other people would get hurt. She is deemed a high suicide risk by behavioral health. She seems to have pretty good insight in my discussion today and to be relatively calm. Multiple suicide attempts  She states in the last few months.   She has chronic psychiatric illness since age of 14, has had an acute exacerbation related to the death of her significant other    Major depression disorder  Being admitted for inpatient voluntary psychiatric care    Multiple psychosocial stressors  Including the death of her boyfriend recently after having been on hospice for 6 years and being kicked out of his house by her parents with no place to live etc.    Cocaine use  Present in urine drug screen      Nicotine addiction  Been provided with nicotine replacement    Significant weight loss/malnutrition  Seems mostly situational  TSH has been obtained which was normal  Should get a t cortisol level given her affect of disturbance and weight loss  We will proceed also with celiac panel tells me she is weak and I told her its not surprising but while she is here should rule out the. GERD  Continue PPI  History of hepatitis C    Hypokalemia  Supplemented in the ED. This goes against adrenal insufficiency, but we will recheck in the morning  High-grade squamous cell dysplasia (carcinoma in situ) 2017  She tells me quite competently that she sees a OB/GYN on a routine basis and in fact just saw him recently and completed 2 courses of treatment for trichomonas which have not totally worked. Memory loss  Evaluation 2020 for memory loss as an outpatient including unremarkable MRI, carotid duplex neurology consultation    Self-reported history of only partially treated trichomonas by her OB/GYN. I think she gives a good story and I will give her another course of treatment for that.       SAFETY:   Code Status:Full  DVT prophylaxis:No VTE Prophylaxis Needed  Bladder catheter:no  Disposition: TBD, likely home when stable  Admission status:  Inpatient      Randi Esquivel MD  [facility] Hospitalist  [phone]

## 2022-08-22 NOTE — ED NOTES
Security at bedside to document belongings with pt able to visualize. Sitter also at bedside with pt, no signs of distress, polite interactions with staff.

## 2022-08-23 PROBLEM — F31.4 SEVERE DEPRESSED BIPOLAR I DISORDER WITHOUT PSYCHOTIC FEATURES (HCC): Status: ACTIVE | Noted: 2022-08-22

## 2022-08-23 LAB
ANION GAP SERPL CALC-SCNC: 9 MMOL/L (ref 5–15)
BUN SERPL-MCNC: 7 MG/DL (ref 6–20)
BUN/CREAT SERPL: 9 (ref 12–20)
CALCIUM SERPL-MCNC: 9.1 MG/DL (ref 8.5–10.1)
CHLORIDE SERPL-SCNC: 104 MMOL/L (ref 97–108)
CHOLEST SERPL-MCNC: 162 MG/DL
CO2 SERPL-SCNC: 29 MMOL/L (ref 21–32)
CORTIS SERPL-MCNC: 16.9 UG/DL
CREAT SERPL-MCNC: 0.77 MG/DL (ref 0.55–1.02)
EST. AVERAGE GLUCOSE BLD GHB EST-MCNC: 108 MG/DL
GLUCOSE SERPL-MCNC: 106 MG/DL (ref 65–100)
HBA1C MFR BLD: 5.4 % (ref 4–5.6)
HDLC SERPL-MCNC: 38 MG/DL
HDLC SERPL: 4.3 {RATIO} (ref 0–5)
LDLC SERPL CALC-MCNC: 105.2 MG/DL (ref 0–100)
POTASSIUM SERPL-SCNC: 3.4 MMOL/L (ref 3.5–5.1)
SODIUM SERPL-SCNC: 142 MMOL/L (ref 136–145)
TRIGL SERPL-MCNC: 94 MG/DL (ref ?–150)
VLDLC SERPL CALC-MCNC: 18.8 MG/DL

## 2022-08-23 PROCEDURE — 90792 PSYCH DIAG EVAL W/MED SRVCS: CPT | Performed by: PSYCHIATRY & NEUROLOGY

## 2022-08-23 PROCEDURE — 83516 IMMUNOASSAY NONANTIBODY: CPT

## 2022-08-23 PROCEDURE — 36415 COLL VENOUS BLD VENIPUNCTURE: CPT

## 2022-08-23 PROCEDURE — 83036 HEMOGLOBIN GLYCOSYLATED A1C: CPT

## 2022-08-23 PROCEDURE — 82533 TOTAL CORTISOL: CPT

## 2022-08-23 PROCEDURE — 65220000003 HC RM SEMIPRIVATE PSYCH

## 2022-08-23 PROCEDURE — 80061 LIPID PANEL: CPT

## 2022-08-23 PROCEDURE — 74011250637 HC RX REV CODE- 250/637: Performed by: PSYCHIATRY & NEUROLOGY

## 2022-08-23 PROCEDURE — 74011250637 HC RX REV CODE- 250/637: Performed by: INTERNAL MEDICINE

## 2022-08-23 PROCEDURE — 80048 BASIC METABOLIC PNL TOTAL CA: CPT

## 2022-08-23 RX ORDER — LOXAPINE SUCCINATE 25 MG/1
50 TABLET ORAL
Status: DISCONTINUED | OUTPATIENT
Start: 2022-08-23 | End: 2022-08-30 | Stop reason: HOSPADM

## 2022-08-23 RX ORDER — TRAZODONE HYDROCHLORIDE 100 MG/1
100 TABLET ORAL
Status: DISCONTINUED | OUTPATIENT
Start: 2022-08-23 | End: 2022-08-30 | Stop reason: HOSPADM

## 2022-08-23 RX ORDER — POTASSIUM CHLORIDE 750 MG/1
40 TABLET, FILM COATED, EXTENDED RELEASE ORAL
Status: COMPLETED | OUTPATIENT
Start: 2022-08-23 | End: 2022-08-23

## 2022-08-23 RX ORDER — CLONAZEPAM 0.5 MG/1
0.5 TABLET ORAL
Status: DISCONTINUED | OUTPATIENT
Start: 2022-08-23 | End: 2022-08-30 | Stop reason: HOSPADM

## 2022-08-23 RX ADMIN — TRAZODONE HYDROCHLORIDE 100 MG: 100 TABLET ORAL at 20:59

## 2022-08-23 RX ADMIN — POTASSIUM CHLORIDE 40 MEQ: 750 TABLET, FILM COATED, EXTENDED RELEASE ORAL at 08:59

## 2022-08-23 RX ADMIN — PANTOPRAZOLE SODIUM 40 MG: 40 TABLET, DELAYED RELEASE ORAL at 06:36

## 2022-08-23 RX ADMIN — CARBAMAZEPINE 200 MG: 200 TABLET ORAL at 08:59

## 2022-08-23 RX ADMIN — METRONIDAZOLE 500 MG: 500 TABLET ORAL at 09:04

## 2022-08-23 RX ADMIN — LOXAPINE 50 MG: 25 CAPSULE ORAL at 20:59

## 2022-08-23 RX ADMIN — CARBAMAZEPINE 200 MG: 200 TABLET ORAL at 18:00

## 2022-08-23 RX ADMIN — CLONAZEPAM 0.5 MG: 0.5 TABLET ORAL at 20:59

## 2022-08-23 RX ADMIN — METRONIDAZOLE 500 MG: 500 TABLET ORAL at 18:00

## 2022-08-23 RX ADMIN — CITALOPRAM HYDROBROMIDE 40 MG: 20 TABLET ORAL at 08:59

## 2022-08-23 NOTE — PROGRESS NOTES
Problem: Falls - Risk of  Goal: *Absence of Falls  Description: Document Abhishek Woods Fall Risk and appropriate interventions in the flowsheet.   Outcome: Progressing Towards Goal  Note: Fall Risk Interventions:       Medication Interventions: Teach patient to arise slowly       Problem: Patient Education: Go to Patient Education Activity  Goal: Patient/Family Education  Outcome: Progressing Towards Goal

## 2022-08-23 NOTE — PROGRESS NOTES
Shift change report given to Jessica Ny, re: SBAR. Medications, and behavior.   Gustavo Fall Risk Score (1)

## 2022-08-23 NOTE — BH NOTES
Shift change [facility] [place] re: SBAR, medications, and behavior.   Gustavo fall risk score:  1

## 2022-08-23 NOTE — PROGRESS NOTES
08/22/22 2142 2000 [facility] meeting   Attendance Attended   Number of participants 4   Time in 2000   Time out 2030   Total Time 30   Interventions/techniques Supported   Follows directions Followed directions   Interactions Interacted appropriately   Mental Status Calm   Behavior/appearance Cooperative   Suicide Risk Factors No suicidal thoughts. Suicide Protective Factors Denies intent   Goals Achieved Able to engage in interactions     She watched a movie and ate a snack.

## 2022-08-23 NOTE — BH NOTES
Affect blunted, mood depressed. Alert and oriented x 4. Denies pain. No SI/HI/AVH. No delusional or paranoid statements made. Patient cooperative and pleasant. Medication compliant. Appetite good. Patient quiet and isolative in her room in bed the entire shift. Patient stated \" I am tired and just want to rest.\"  Patient only got out of bed for meals. Patient declined groups. Patient in no apparent distress. Vitals signs within normal limits. PRN Medication Documentation    Specific patient behavior that led to need for PRN medication: Patient requested a PRN Nicotine patch to decrease smoking cessation. Staff interventions attempted prior to PRN being given: Assessment done  PRN medication given: Nicotine patch 21 mg applied topically at 0900. Patient response/effectiveness of PRN medication: Positive effects.

## 2022-08-23 NOTE — H&P
[facility]raza  PSYCH HISTORY AND PHYSICAL    Name:  Ede Espinoza  MR#:  [id number]  :  1975  ACCOUNT #:  [de-identified]  ADMIT DATE:  2022      [facility]    HISTORY OF PRESENT ILLNESS:  The patient is a 54-year-old single white female, who has a reported past psychiatric history of bipolar disorder, borderline personality disorder, impulse control disorder, and possibly PTSD. She was admitted voluntarily from the \[facility]\"" emergency room after presenting herself there with concerns about worsening depression and escalating suicidal thinking. Specifically, she had been having suicidal thoughts and worsening depression since her boyfriend of six plus years  from cancer back on 2022. He was in hospice care and she was helping to care for him, and since his death, her mood has been fairly consistently low with only very few, brief moments of possible hypomania. She states that she had tried to take three different overdoses from May through , but none of them worked. She continued to have suicidal thoughts off and on and she states that what brought her to the emergency room was the thought that she might just run her car into another car to kill herself and then she realized that could potentially harm some another person. She reports classic depressive symptoms at this point including very low mood, anhedonia, poor concentration, feelings of hopelessness, tearfulness, poor appetite, and erratic sleep. She notes, however, that her thoughts are still racing which was evident during the interview, despite being depressed, and that she also has some psychomotor restlessness as well.   She also indicates that as recently as two weeks ago she had some brief manic symptoms, and in the past when she feels manic she describes classic symptoms of greatly elevated mood, and grandiosity, impulsivity, extremely accelerated thinking, complete lack of sleep, and being extremely impulsive with poor judgment. In fact, she states that she had been arrested several times over the past several months on shoplifting charges, which she states were fueled by being manic and impulsive. She does have a past history of some substance abuse issues and she states that she has some limited cocaine use recently as a type of self-medication. Her UDS did test positive for cocaine. She denies any alcohol use except on limited basis, and her blood alcohol level was zero. Of note, she states that she has continued to be prescribed medication as noted below by Dr. Glenny Johnston, but she has really not been taking it since her boyfriend , taking it only about once or twice per week at most.    PAST PSYCHIATRIC HISTORY:  She states she has been hospitalized a couple times in the past including at this facility likely more than eight or nine years ago. She has been followed by Dr. Glenny Johnston since then. She has tried the medicines noted below as well as Depakote (weight gain), Trileptal (low-sodium), lithium, risperidone (weight gain), and Zoloft (\"evil\"). PAST MEDICAL HISTORY:  History of hepatitis C for which she states she is completely cleared. MEDICATIONS ON ADMISSION:  1. Tegretol 200 mg b.i.d.  2.  Geodon 40 mg daily. 3.  Citalopram 40 mg daily. 4.  Amitriptyline 150 mg daily. 5.  Clonazepam 0.5 mg t.i.d. p.r.n.  6.  Prilosec 40 mg daily. ALLERGIES:  OXYCODONE. FAMILY HISTORY:  She states that no one has any diagnosed psychiatric history, but her mother was \"crazy,\" and several other family members are very labile by her description. She has no siblings. SOCIAL HISTORY:  She is single and has never been . She does have three children, however, one in [place], one in [place], and the youngest living in [facility]. She has limited contact with them, if any.   She was living with her boyfriend of six years until his death on 2022, and recently was kicked out of his house and is living in a trailer by herself. She states she has little in the way of any support system. She quit school in the 12th grade and later received her GED. She does not work. She does smoke between one and two packs of cigarettes per day. MENTAL STATUS EXAMINATION:  The patient was noted to be a casually dressed, somewhat poorly groomed 76-year-old, who appeared her stated age. She was lying in bed, but was able to remain awake and alert during the interview. She endorsed being depressed and dysphoric as indicated above, but her speech was minimally quick and slightly pressured and her thoughts were accelerated. However, her mood was clearly low and she continues to report some hopelessness, but no suicidal plans or intentions here in the hospital.  There was no evidence of any psychosis such as hallucinations or delusional thinking, and no evidence of any jyothi except for the racing thoughts and slightly pressured speech. There was a mild degree of psychomotor restlessness noted. Her judgment and insight is historically quite poor, but at least adequate currently. Her cognitive functioning shows some deficits in areas of concentration and attention span, but otherwise, no overt deficits. DIAGNOSTIC IMPRESSION:  AXIS I:  1. Bipolar disorder, currently depressed, severe (F31.4) - - rapid cycling. 2.  Possible Cocaine use disorder (F14.10). AXIS II. Deferred. AXIS III:  History of hepatitis C; reported prior overdoses. AXIS IV:  Stressors are severe. AXIS V:  Global Assessment of Functioning currently is 40-45. PLAN:  1. We will admit the patient for further supportive treatment, close observation, increased structure, and involvement within the groups and milieu. 2.  We will continue the Tegretol at 200 mg b.i.d. as she feels it has been effective and it has not had adverse side effects like many other medicines.   3.  We will change the Geodon to loxapine at 50 mg at bedtime to provide better mood stabilization. 4.  We will continue the citalopram at 40 mg daily and the p.r.n. Klonopin, but at a less frequent dose of b.i.d. p.r.n.  5.  We will change the amitriptyline to trazodone at 100 mg at bedtime p.r.n. for sleep as the former could be activating some of the mood instability. 6.  Estimated length of stay would likely be on the order of four to seven days depending upon her response to treatment. She will follow up with Dr. Eleazar Berry. I certify that this patient's inpatient psychiatric hospital admission is medically necessary for treatment, which could reasonably be expected to improve her condition or for diagnostic study. The inpatient psychiatric services are provided while she is under the care of a physician and are included in the individualized plan of care.         Casie Guzman MD      [id number]  D:  08/23/2022 10:11  T:  08/23/2022 10:43  JOB #:  [id number]

## 2022-08-23 NOTE — GROUP NOTE
[facility]  GROUP DOCUMENTATION INDIVIDUAL                                                                          Group Therapy Note    April was sleeping during group and did not attend. We will continue to encourage group participation.       Pawan Toledo

## 2022-08-23 NOTE — PROGRESS NOTES
Problem: Falls - Risk of  Goal: *Absence of Falls  Description: Document Daniela Cameron Fall Risk and appropriate interventions in the flowsheet.   Outcome: Progressing Towards Goal  Note: Fall Risk Interventions:            Medication Interventions: Teach patient to arise slowly                   Problem: Anxiety-Behavioral Health (Adult/Pediatric)  Goal: *STG: Remains safe in hospital  Outcome: Progressing Towards Goal  Goal: *STG: Attends activities and groups  Outcome: Progressing Towards Goal  Goal: *STG/LTG: Complies with medication therapy  Outcome: Progressing Towards Goal     Problem: Depressed Mood (Adult/Pediatric)  Goal: *STG: Attends activities and groups  Outcome: Progressing Towards Goal  Goal: *STG: Remains safe in hospital  Outcome: Progressing Towards Goal  Goal: *STG: Complies with medication therapy  Outcome: Progressing Towards Goal

## 2022-08-24 PROCEDURE — 65220000003 HC RM SEMIPRIVATE PSYCH

## 2022-08-24 PROCEDURE — 99231 SBSQ HOSP IP/OBS SF/LOW 25: CPT | Performed by: PSYCHIATRY & NEUROLOGY

## 2022-08-24 PROCEDURE — 74011250637 HC RX REV CODE- 250/637: Performed by: INTERNAL MEDICINE

## 2022-08-24 PROCEDURE — 74011250637 HC RX REV CODE- 250/637: Performed by: PSYCHIATRY & NEUROLOGY

## 2022-08-24 RX ADMIN — PANTOPRAZOLE SODIUM 40 MG: 40 TABLET, DELAYED RELEASE ORAL at 07:30

## 2022-08-24 RX ADMIN — CARBAMAZEPINE 200 MG: 200 TABLET ORAL at 17:59

## 2022-08-24 RX ADMIN — LOXAPINE 50 MG: 25 CAPSULE ORAL at 20:45

## 2022-08-24 RX ADMIN — CLONAZEPAM 0.5 MG: 0.5 TABLET ORAL at 19:19

## 2022-08-24 RX ADMIN — CITALOPRAM HYDROBROMIDE 40 MG: 20 TABLET ORAL at 08:46

## 2022-08-24 RX ADMIN — METRONIDAZOLE 500 MG: 500 TABLET ORAL at 08:46

## 2022-08-24 RX ADMIN — CARBAMAZEPINE 200 MG: 200 TABLET ORAL at 08:46

## 2022-08-24 RX ADMIN — METRONIDAZOLE 500 MG: 500 TABLET ORAL at 17:59

## 2022-08-24 RX ADMIN — TRAZODONE HYDROCHLORIDE 100 MG: 100 TABLET ORAL at 20:45

## 2022-08-24 NOTE — GROUP NOTE
[facility] GROUP DOCUMENTATION INDIVIDUAL                                                                          Group Therapy Note    Date: 8/24/2022    Group Start Time: 0900  Group End Time: 6799  Group Topic: Process Group - Inpatient    [address], [address] [facility] GROUP DOCUMENTATION GROUP    Group Therapy Note    Attendees: Focus of session was based on handout called 39 Freda Ulrich for Revitalizing and Energizing Yourself.   We reviewed the list of coping skills that people can use to do the following: develop self-understanding, improve your mind, develop healthy attitudes, control your emotions, strengthen your body, improve personal relationships, lift your spirit, improve your environment, and live a healthy lifestyle. We spoke about which coping skills they are using successfully and which ones they can see will benefit from using and how they can get started. Attendance: Attended    Patient's Goal:      Seeks staff when feelings of anxiety and fear arise          Interventions/techniques: Challenged, Informed, Promoted peer support, and Supported    Follows Directions: Followed directions    Interactions: Interacted appropriately    Mental Status: Calm, Congruent, and Flat    Behavior/appearance: Cooperative, Needed prompting, Poor eye contact, and Withdrawn/quiet    Goals Achieved: Able to engage in interactions, Able to receive feedback, and Discussed coping      Additional Notes:  April was in group but she participated minimally. She had little to say stating she was really tired and sleepy. She is aware that she has things to work on when she gets home but she did not share anything specific.       Ashlee More

## 2022-08-24 NOTE — BH NOTES
Shift change report given to Valdo Flores re: SBAR, medications, and behavior.   Gustavo fall risk score:  1

## 2022-08-24 NOTE — BH NOTES
Affect blunted, mood depressed/anxious. Alert and oriented x 4. Denies pain. No SI/HI/AVH. No delusional or paranoid statements made. Patient cooperative and pleasant. Patient quiet and isolative but interacts with prompting. Patient attended and engaged in group with prompting. Medication compliant. Appetite good eats 100% of all meals and snacks. Patient in no apparent distress. Vitals signs within normal limits.

## 2022-08-24 NOTE — BH NOTES
Affect blunted, mood anxious. Pt attended and participated in scheduled group activities but was minimally social with staff and peers. Pt was very isolative this evening remaining in her room and only coming out briefly for group/snack. Pt ate 100% of snack. Pt vital signs stable with no complaints of pain. Pt denies SI/HI/AVH. Pt was compliant with medications and requested a PRN. Pt stated that she \"is just tired and needs sleep\". Pt is in no apparent distress at this time and made no delusional statements. Pt rested in her bed with her eyes closed for 8.25 hours.     PRN Medication Documentation    Specific patient behavior that led to need for PRN medication: anxiety  Staff interventions attempted prior to PRN being given: Pt request  PRN medication given: Klonopin 0.5 mg PO @ 2059  Patient response/effectiveness of PRN medication: Pt rested     PRN Medication Documentation    Specific patient behavior that led to need for PRN medication: restless  Staff interventions attempted prior to PRN being given: Pt request  PRN medication given: Trazodone 100 mg PO @ 2059  Patient response/effectiveness of PRN medication:Pt rested

## 2022-08-24 NOTE — PROGRESS NOTES
Comprehensive Nutrition Assessment    Type and Reason for Visit: Initial, Positive nutrition screen, Consult    Nutrition Recommendations/Plan:   Continue regular diet      Malnutrition Assessment:  Malnutrition Status: Moderate malnutrition (08/24/22 1628)    Context:  Social/environmental circumstances      Nutrition Assessment:  Pt admitted with bipolar disorder, possible cocaine use d/o, h/o hepatitis C, stressor. She stated she was has lost 30# in 3 months-which is severe wt loss. She was eating poor during this time, but now she said because she is on certain meds she feels like her appetite is returned. She is now eating well. Her potassium has been low and now is 3.4 as of yesterday but she has been on kcl a couple of days. She feels as if she can eat well now she does not want supplements. If po intake drops will consider supplements. Nutrition Related Findings:      Wound Type: None    Current Nutrition Intake & Therapies:  Average Meal Intake: %  Average Supplement Intake: None ordered  ADULT DIET Regular  Patient Vitals for the past 168 hrs:   % Diet Eaten   08/23/22 1400 0%   08/23/22 1228 51 - 75%   08/23/22 1030 0%   08/23/22 0839 51 - 75%   08/22/22 1743 76 - 100%   08/22/22 1512 76 - 100%       Anthropometric Measures:  Height: 5' 6\" (167.6 cm)  Ideal Body Weight (IBW): 130 lbs (59 kg)  Current Body Wt:  59 kg (130 lb), 100 % IBW. Current BMI (kg/m2): 21  BMI Category: Normal weight (BMI 18.5-24. 9)  Weight Loss Metrics 8/22/2022 11/30/2020 8/27/2020 6/19/2017 6/6/2017 3/5/2014   Today's Wt 130 lb 175 lb 172 lb 138 lb 138 lb 164 lb   BMI 20.98 kg/m2 28.25 kg/m2 27.76 kg/m2 22.27 kg/m2 22.27 kg/m2 25.68 kg/m2       Estimated Daily Nutrient Needs:  Energy Requirements Based On: Formula  Weight Used for Energy Requirements: Admission  Energy (kcal/day): 1608  Weight Used for Protein Requirements: Admission  Protein (g/day): 59  Method Used for Fluid Requirements: 1 ml/kcal  Fluid (ml/day): 1600    Nutrition Diagnosis:   Moderate malnutrition related to psychological cause or life stress as evidenced by weight loss 7.5% in 3 months    Nutrition Interventions:   Food and/or Nutrient Delivery: Continue current diet  Nutrition Education/Counseling: No recommendations at this time, Education not indicated  Coordination of Nutrition Care: Continue to monitor while inpatient, Interdisciplinary rounds  Plan of Care discussed with: pt    Goals:     Goals: Meet at least 75% of estimated needs, by next RD assessment       Nutrition Monitoring and Evaluation:   Behavioral-Environmental Outcomes: None identified  Food/Nutrient Intake Outcomes: Food and nutrient intake  Physical Signs/Symptoms Outcomes: Biochemical data, Weight, Meal time behavior    Discharge Planning:    Continue current diet    Judy Foster RD

## 2022-08-24 NOTE — PROGRESS NOTES
INTERVAL Hx:  Records and clinical information were reviewed. States she's still feeling dysphoric, but safer being in the hospital. Has been isolative, but not agitated. States she slept well with the change to Trazodone (from ATP), and she denies having any SE's with the Loxapine or other meds. Denies having any psychotic sx's and her thoughts seem to be slightly less racing and pressured this AM. Discussed likely course of Tx and what to expect. Slept 8.25 hours last night. MEDS:  Current Facility-Administered Medications   Medication Dose Route Frequency    traZODone (DESYREL) tablet 100 mg  100 mg Oral QHS PRN    clonazePAM (KlonoPIN) tablet 0.5 mg  0.5 mg Oral BID PRN    loxapine (LOXITANE) capsule 50 mg  50 mg Oral QHS    ibuprofen (MOTRIN) tablet 400 mg  400 mg Oral Q8H PRN    magnesium hydroxide (MILK OF MAGNESIA) 400 mg/5 mL oral suspension 30 mL  30 mL Oral DAILY PRN    alum-mag hydroxide-simeth (MYLANTA) oral suspension 30 mL  30 mL Oral Q4H PRN    carBAMazepine (TEGretol) tablet 200 mg  200 mg Oral BID    cholecalciferol (VITAMIN D3) (1000 Units /25 mcg) tablet 5,000 Units  5,000 Units Oral Q7D    citalopram (CELEXA) tablet 40 mg  40 mg Oral DAILY    pantoprazole (PROTONIX) tablet 40 mg  40 mg Oral ACB    hydrOXYzine pamoate (VISTARIL) capsule 50 mg  50 mg Oral TID PRN    metroNIDAZOLE (FLAGYL) tablet 500 mg  500 mg Oral BID    nicotine (NICODERM CQ) 21 mg/24 hr patch 1 Patch  1 Patch TransDERmal DAILY PRN       VITALS: Patient Vitals for the past 12 hrs:   Temp Pulse Resp BP SpO2   08/24/22 0735 97.5 °F (36.4 °C) 94 17 92/64 97 %       LABS: .No results found for this or any previous visit (from the past 24 hour(s)).     MSE:   Appearance: casually dressed; fair grooming  Behavior: isolative but cooperative; no agitation  Motor: less restless  Mood/Affect: dysphoric; less labile  Thought Process: coherent; organized  Thought Content: no delusions; no SI/HI  Perceptions: no hallucinations  Insight/Judgment: fair    ASSESSMENT:   1. Bipolar disorder, depressed, severe (F31.4)  2. Cocaine Use, episodic (F14.10)    PLAN:  1. Continue the Loxapine at 50 mg QHS. 2.  Continue the Tegretol at 200 mg BID. 3.  Continue the Celexa t 40 mg daily. 4.  Continue the PRN Trazodone and Clonazepam.  5.  Check a [CBZ] in the AM.  6.  ELOS: 4-5 days.

## 2022-08-24 NOTE — PROGRESS NOTES
Patient Goal; To get her appetite back. Patient appetite was fair today, physical pain lower back level 5, no suicidal thoughts. Patient state that she has depression level 5 and anxiety level 3.   08/23/22 2122 2000 [facility] meeting   Attendance Attended   Number of participants 3   Time in 2000   Time out 2030   Total Time 30   MTP problem Depression   Interventions/techniques Provided feedback   Follows directions Followed directions   Interactions Interacted appropriately   Mental Status Depressed;Flat   Behavior/appearance Cooperative   Suicide Risk Factors no suicidal thoughts   Goals Achieved Able to listen to others; Able to receive feedback

## 2022-08-25 LAB
CARBAMAZEPINE SERPL-MCNC: 9.5 UG/ML (ref 4–12)
ENDOMYSIUM IGA SER QL: NEGATIVE
GLIADIN PEPTIDE IGA SER-ACNC: 2 UNITS (ref 0–19)
GLIADIN PEPTIDE IGG SER-ACNC: 1 UNITS (ref 0–19)
IGA SERPL-MCNC: 121 MG/DL (ref 87–352)
TTG IGA SER-ACNC: <2 U/ML (ref 0–3)
TTG IGG SER-ACNC: <2 U/ML (ref 0–5)

## 2022-08-25 PROCEDURE — 74011250637 HC RX REV CODE- 250/637: Performed by: PSYCHIATRY & NEUROLOGY

## 2022-08-25 PROCEDURE — 99231 SBSQ HOSP IP/OBS SF/LOW 25: CPT | Performed by: PSYCHIATRY & NEUROLOGY

## 2022-08-25 PROCEDURE — 74011250637 HC RX REV CODE- 250/637: Performed by: INTERNAL MEDICINE

## 2022-08-25 PROCEDURE — 80156 ASSAY CARBAMAZEPINE TOTAL: CPT

## 2022-08-25 PROCEDURE — 65220000003 HC RM SEMIPRIVATE PSYCH

## 2022-08-25 PROCEDURE — 36415 COLL VENOUS BLD VENIPUNCTURE: CPT

## 2022-08-25 RX ORDER — POTASSIUM CHLORIDE 750 MG/1
30 TABLET, FILM COATED, EXTENDED RELEASE ORAL
Status: COMPLETED | OUTPATIENT
Start: 2022-08-25 | End: 2022-08-25

## 2022-08-25 RX ORDER — BISACODYL 5 MG
5 TABLET, DELAYED RELEASE (ENTERIC COATED) ORAL DAILY PRN
Status: DISCONTINUED | OUTPATIENT
Start: 2022-08-25 | End: 2022-08-30 | Stop reason: HOSPADM

## 2022-08-25 RX ADMIN — HYDROXYZINE PAMOATE 50 MG: 50 CAPSULE ORAL at 22:28

## 2022-08-25 RX ADMIN — CLONAZEPAM 0.5 MG: 0.5 TABLET ORAL at 19:43

## 2022-08-25 RX ADMIN — CARBAMAZEPINE 200 MG: 200 TABLET ORAL at 08:21

## 2022-08-25 RX ADMIN — POTASSIUM CHLORIDE 30 MEQ: 750 TABLET, FILM COATED, EXTENDED RELEASE ORAL at 08:21

## 2022-08-25 RX ADMIN — ALUMINUM HYDROXIDE, MAGNESIUM HYDROXIDE, AND SIMETHICONE 30 ML: 200; 200; 20 SUSPENSION ORAL at 17:48

## 2022-08-25 RX ADMIN — CARBAMAZEPINE 200 MG: 200 TABLET ORAL at 17:36

## 2022-08-25 RX ADMIN — METRONIDAZOLE 500 MG: 500 TABLET ORAL at 08:21

## 2022-08-25 RX ADMIN — METRONIDAZOLE 500 MG: 500 TABLET ORAL at 17:40

## 2022-08-25 RX ADMIN — PANTOPRAZOLE SODIUM 40 MG: 40 TABLET, DELAYED RELEASE ORAL at 06:31

## 2022-08-25 RX ADMIN — LOXAPINE 50 MG: 25 CAPSULE ORAL at 21:06

## 2022-08-25 RX ADMIN — TRAZODONE HYDROCHLORIDE 100 MG: 100 TABLET ORAL at 21:06

## 2022-08-25 RX ADMIN — BISACODYL 5 MG: 5 TABLET, COATED ORAL at 21:06

## 2022-08-25 RX ADMIN — CITALOPRAM HYDROBROMIDE 40 MG: 20 TABLET ORAL at 08:21

## 2022-08-25 RX ADMIN — HYDROXYZINE PAMOATE 50 MG: 50 CAPSULE ORAL at 16:46

## 2022-08-25 NOTE — PROGRESS NOTES
INTERVAL Hx:  Records and clinical information were reviewed. States she's feeling slightly less dysphoric, but still fearful of how she'll cope once D/C'ed. Also still isolative most of the time, but will interact when needed. Slept well again last night with the change to Trazodone (from ATP), and she denies having any SE's with the Loxapine or other meds. No mood swings or lability noted and she denies having any psychotic sx's either. Less pressured and restless as well. Slept 8 hours last night. MEDS:  Current Facility-Administered Medications   Medication Dose Route Frequency    traZODone (DESYREL) tablet 100 mg  100 mg Oral QHS PRN    clonazePAM (KlonoPIN) tablet 0.5 mg  0.5 mg Oral BID PRN    loxapine (LOXITANE) capsule 50 mg  50 mg Oral QHS    ibuprofen (MOTRIN) tablet 400 mg  400 mg Oral Q8H PRN    magnesium hydroxide (MILK OF MAGNESIA) 400 mg/5 mL oral suspension 30 mL  30 mL Oral DAILY PRN    alum-mag hydroxide-simeth (MYLANTA) oral suspension 30 mL  30 mL Oral Q4H PRN    carBAMazepine (TEGretol) tablet 200 mg  200 mg Oral BID    cholecalciferol (VITAMIN D3) (1000 Units /25 mcg) tablet 5,000 Units  5,000 Units Oral Q7D    citalopram (CELEXA) tablet 40 mg  40 mg Oral DAILY    pantoprazole (PROTONIX) tablet 40 mg  40 mg Oral ACB    hydrOXYzine pamoate (VISTARIL) capsule 50 mg  50 mg Oral TID PRN    metroNIDAZOLE (FLAGYL) tablet 500 mg  500 mg Oral BID    nicotine (NICODERM CQ) 21 mg/24 hr patch 1 Patch  1 Patch TransDERmal DAILY PRN       VITALS: Patient Vitals for the past 12 hrs:   Temp Pulse Resp BP SpO2   08/25/22 0740 98.3 °F (36.8 °C) 83 17 (!) 127/58 96 %         LABS: .No results found for this or any previous visit (from the past 24 hour(s)).     MSE:   Appearance: casually dressed; fair grooming  Behavior: isolative but cooperative; no agitation  Motor: less restless  Mood/Affect: dysphoric; less labile  Thought Process: coherent; organized  Thought Content: no delusions; no SI/HI  Perceptions: no hallucinations  Insight/Judgment: fair    ASSESSMENT:   1. Bipolar disorder, depressed, severe (F31.4)  2. Cocaine Use, episodic (F14.10)    PLAN:  1. Continue the Loxapine at 50 mg QHS. 2.  Continue the Tegretol at 200 mg BID and check level drawn this AM.  3.  Continue the Celexa at 40 mg daily. 4.  Continue the PRN Trazodone and Clonazepam.  5.  ELOS: 4-5 days--F/U with Dr. Flora Avina in [address] [place] [address].

## 2022-08-25 NOTE — BH NOTES
PSYCHOSOCIAL ASSESSMENT  :Patient identifying info:   Jennifer Petit is a 49 y.o., female admitted 2022 12:49 PM     Presenting problem and precipitating factors: The patient is a 41-year-old single white female, who has a reported past psychiatric history of bipolar disorder, borderline personality disorder, impulse control disorder, and possibly PTSD. She was [facility] voluntarily from [facility] after presenting herself there with concerns about worsening depression and escalating suicidal thinking. Specifically, she had been having suicidal thoughts and worsening depression since her boyfriend of six plus years  from cancer back on 2022. He was in hospice care and she was helping to care for him, and since his death, her mood has been fairly consistently low with only very few, brief moments of possible hypomania. She states that she had tried to take three different overdoses from May through , but none of them worked. She continued to have suicidal thoughts off and on and she states that what brought her to the emergency room was the thought that she might just run her car into another car to kill herself and then she realized that could potentially harm some another person. Mental status assessment: The patient was noted to be a casually dressed, somewhat poorly groomed 41-year-old, who appeared her stated age. She endorsed being depressed and dysphoric as indicated above, but her speech was minimally quick and slightly pressured and her thoughts were accelerated. However, her mood was clearly low and she continues to report some hopelessness, but no suicidal plans or intentions here in the hospital.  There was no evidence of any psychosis such as hallucinations or delusional thinking, and no evidence of any jyothi except for the racing thoughts and slightly pressured speech. There was a mild degree of psychomotor restlessness noted.   Her judgment and insight is historically quite poor, but at least adequate currently. Her cognitive functioning shows some deficits in areas of concentration and attention span, but otherwise, no overt deficits. Strengths/Recreation/Coping Skills:Unsure. She was unable to share positives even with direct questions and encouragement. Collateral information: None    Current psychiatric /substance abuse providers and contact info: Dr. Reema Rizvi. No other providers. Previous psychiatric/substance abuse providers and response to treatment: She has been inpatient here before about 9 years ago. She reports several other hospitalizations since then. Family history of mental illness or substance abuse: unknown. Substance abuse history:    Social History     Tobacco Use    Smoking status: Every Day     Packs/day: 1.00     Types: Cigarettes    Smokeless tobacco: Never   Substance Use Topics    Alcohol use: Not Currently       History of biomedical complications associated with substance abuse:Has a history of SA issues and has recently used cocaine. Patient's current acceptance of treatment or motivation for change: unmotivated    Family constellation: She was a single child. She reports her mother was \"crazy\" but there is not diagnosed mental health issues in her family. Is significant other involved? No her fiance passed away in May of this year. Describe support system: Limited. Describe living arrangements and home environment: Now lives in a trailer with no running water as she had to leave her house she shared with her fiance. She says the water can be fixed and she is trying to work on it.       GUARDIAN/POA: NO    Guardian Name: JESUS    Guardian Contact: JESUS    Health issues:   Hospital Problems  Date Reviewed: 8/23/2022            Codes Class Noted POA    * (Principal) Severe depressed bipolar I disorder without psychotic features [facility]) ICD-10-CM: F31.4  ICD-9-CM: 296.53  8/22/2022 Unknown Trauma history: Lost her fiance of six years to cancer in May fo this year. She is unclear about past trauma in her life. Legal issues: None known. History of  service: None    Financial status: SSI and food stamps    Mu-ism/cultural factors: Unknown. Guarded. Education/work history: GED. Have you been licensed as a health care professional (current or ): no    Describe coping skills: She is unable to describe any effective coping skills at this time. She reports the grief over her fiance has been overwhelming for her.       Ernst Jernigan  2022

## 2022-08-25 NOTE — BH NOTES
Shift change report given to Thai Wolfe RN re: SBAR, medications, and behavior.   Gustavo fall risk score 1

## 2022-08-25 NOTE — PROGRESS NOTES
Problem: Falls - Risk of  Goal: *Absence of Falls  Description: Document Alexandra Minium Fall Risk and appropriate interventions in the flowsheet.   Outcome: Progressing Towards Goal  Note: Fall Risk Interventions:            Medication Interventions: Teach patient to arise slowly

## 2022-08-25 NOTE — GROUP NOTE
[facility]  GROUP DOCUMENTATION INDIVIDUAL                                                                          Group Therapy Note    Date: 8/25/2022    Group Start Time: 0900  Group End Time: 6473  Group Topic: Process Group - Inpatient    [address], [address] GROUP DOCUMENTATION GROUP    Group Therapy Note    Attendees: Focus of session was based on the article by Tyler Styles 8 Questions that Will Change Your Attitude (When You Can't Change Anything else). We spoke about the quote a peaceful person is not a person who is always in a good situation, but rather a person who always has a good attitude in every situation.   We spoke about how the one controllable thing in every situation is our attitude and making deliberate choices about it. We went through the questions in the article: who would you be and what else would you see, if you erased the thought that's worrying you, what could you be positive about right now if you really wanted to, how can you respond from a place of clarity and strength, rather than thoughtlessly reacting to this experience, and what can you let go of right now without losing anything. We spoke about how having moment to moment attitude checks can help us cope better in the day to day. Attendance: Attended    Patient's Goal:      Seeks staff when feelings of anxiety and fear arise           Interventions/techniques: Informed, Validated, Promoted peer support, and Supported    Follows Directions: Followed directions    Interactions: Interacted appropriately    Mental Status: Calm, Congruent, and Preoccupied    Behavior/appearance: Attentive, Cooperative, Needed prompting, and Withdrawn/quiet    Goals Achieved: Able to engage in interactions, Able to receive feedback, and Discussed coping      Additional Notes:  April was minimal in her interaction. She did say that she continues to be sleeoy and she is not sure why.   She said she feels like she can sleep all day and she sits throughout group mostly with her eyes closed. She was asked to think about something that she is focused on right now in her mind and she said \"getting back to sleep. \"      Mireille Li

## 2022-08-25 NOTE — BH NOTES
Mood labile/anxious. Alert and oriented x 3. Cooperative and guarded. Blunted affect. Interacts with staff and peers. Attended and participated in group. Denies SI/HI/AVH and pain. Medication compliant. Given nicoderm patch for nicotine withdrawal at 1533 and medicated Vistaril prn for anxiousness at 1646 and Milk of Magnesia at 21  for c/o constipation. Will continue to monitor.      PRN Medication Documentation    Specific patient behavior that led to the need for PRN medication: Nicotine withdrawal  Staff interventions attempted prior to PRN being given: patient request  PRN medication given: Nicotine patch 21 mg/24hr transdermal @ [id number]  Patient responsiveness/effectiveness of PRN medication: helpful      PRN Medication Documentation    Specific patient behavior that led to the need for PRN medication: anxious  Staff interventions attempted prior to PRN being given: rest, patient request  PRN medication given: Vistaril 50 mg by mouth at @1646  Patient responsiveness/effectiveness of PRN medication: helpful    PRN Medication Documentation    Specific patient behavior that led to the need for PRN medication: c/o constipation  Staff interventions attempted prior to PRN being given: patient request  PRN medication given: Milk of Magnesia 30 ml by mouth at 21   Patient responsiveness/effectiveness of PRN medication: helped with stomache

## 2022-08-25 NOTE — PROGRESS NOTES
Problem: Falls - Risk of  Goal: *Absence of Falls  Description: Document Diogo Dale Fall Risk and appropriate interventions in the flowsheet.   Outcome: Progressing Towards Goal  Note: Fall Risk Interventions:            Medication Interventions: Teach patient to arise slowly                   Problem: Patient Education: Go to Patient Education Activity  Goal: Patient/Family Education  Outcome: Progressing Towards Goal

## 2022-08-25 NOTE — BH NOTES
Affect constricted, mood depressed/anxious. Alert and Oriented X 4. Cooperative and guarded. Attended and participated in group. Interacted with staff and peers. Makes needs known. Ate asnacks. Denied SI/HI/AVH and pain. Medication compliant. Stable with no s/s of distress.   Laid in bed with eyes closed for 8 hours    PRN Medication Documentation    Specific patient behavior that led to need for PRN medication:anxious  Staff interventions attempted prior to PRN being given:calming technique  PRN medication given: Klonopin 0.5 mg po at [address]  Patient response/effectiveness of PRN medication: tolerated    PRN Medication Documentation    Specific patient behavior that led to need for PRN medication:sleep  Staff interventions attempted prior to PRN being given: requested  PRN medication given: trazodone 100 mg po at 2045  Patient response/effectiveness of PRN medication: tolerated

## 2022-08-25 NOTE — PROGRESS NOTES
Patient said she had a better day . Patient said she had depression 5/10 . Patient said she has no pain , no anxiety and no SI    08/24/22 2131   Group Therapy   Group Community meeting   Number of participants 2   Time in 1950   Time out 2020   Total Time 30   MTP problem Depression; Fall risk;Pain   Interventions/techniques Provided feedback   Follows directions Followed directions   Interactions Interacted appropriately   Mental Status Depressed   Behavior/appearance Cooperative   Suicide Risk Factors No SI   Suicide Protective Factors Denies intent   Goals Achieved Able to listen to others

## 2022-08-25 NOTE — BH NOTES
Shift change report given to  Everardo Coley RN. Re; SBAR, medications, and behavior.    STRATTON fall risk score; 1

## 2022-08-26 LAB
ANION GAP SERPL CALC-SCNC: 7 MMOL/L (ref 5–15)
BUN SERPL-MCNC: 8 MG/DL (ref 6–20)
BUN/CREAT SERPL: 12 (ref 12–20)
CALCIUM SERPL-MCNC: 8.9 MG/DL (ref 8.5–10.1)
CHLORIDE SERPL-SCNC: 102 MMOL/L (ref 97–108)
CO2 SERPL-SCNC: 29 MMOL/L (ref 21–32)
CREAT SERPL-MCNC: 0.69 MG/DL (ref 0.55–1.02)
GLUCOSE SERPL-MCNC: 108 MG/DL (ref 65–100)
POTASSIUM SERPL-SCNC: 4.4 MMOL/L (ref 3.5–5.1)
SODIUM SERPL-SCNC: 138 MMOL/L (ref 136–145)

## 2022-08-26 PROCEDURE — 65220000003 HC RM SEMIPRIVATE PSYCH

## 2022-08-26 PROCEDURE — 80048 BASIC METABOLIC PNL TOTAL CA: CPT

## 2022-08-26 PROCEDURE — 74011250637 HC RX REV CODE- 250/637: Performed by: INTERNAL MEDICINE

## 2022-08-26 PROCEDURE — 36415 COLL VENOUS BLD VENIPUNCTURE: CPT

## 2022-08-26 PROCEDURE — 74011250637 HC RX REV CODE- 250/637: Performed by: PSYCHIATRY & NEUROLOGY

## 2022-08-26 PROCEDURE — 99231 SBSQ HOSP IP/OBS SF/LOW 25: CPT | Performed by: PSYCHIATRY & NEUROLOGY

## 2022-08-26 RX ADMIN — CARBAMAZEPINE 200 MG: 200 TABLET ORAL at 17:20

## 2022-08-26 RX ADMIN — METRONIDAZOLE 500 MG: 500 TABLET ORAL at 09:12

## 2022-08-26 RX ADMIN — CLONAZEPAM 0.5 MG: 0.5 TABLET ORAL at 19:41

## 2022-08-26 RX ADMIN — PANTOPRAZOLE SODIUM 40 MG: 40 TABLET, DELAYED RELEASE ORAL at 06:30

## 2022-08-26 RX ADMIN — LOXAPINE 50 MG: 25 CAPSULE ORAL at 21:15

## 2022-08-26 RX ADMIN — TRAZODONE HYDROCHLORIDE 100 MG: 100 TABLET ORAL at 21:15

## 2022-08-26 RX ADMIN — CITALOPRAM HYDROBROMIDE 40 MG: 20 TABLET ORAL at 09:12

## 2022-08-26 RX ADMIN — CARBAMAZEPINE 200 MG: 200 TABLET ORAL at 09:12

## 2022-08-26 RX ADMIN — BISACODYL 5 MG: 5 TABLET, COATED ORAL at 16:44

## 2022-08-26 RX ADMIN — METRONIDAZOLE 500 MG: 500 TABLET ORAL at 17:20

## 2022-08-26 NOTE — GROUP NOTE
[facility]  GROUP DOCUMENTATION INDIVIDUAL                                                                          Group Therapy Note    Date: 8/26/2022    April was sleeping during group session. We will continue to encourage group participation to help plan for discharge.     Pawan Toledo

## 2022-08-26 NOTE — PROGRESS NOTES
Problem: Falls - Risk of  Goal: *Absence of Falls  Description: Document Kisha Davalos Fall Risk and appropriate interventions in the flowsheet.   Outcome: Progressing Towards Goal  Note: Fall Risk Interventions:            Medication Interventions: Teach patient to arise slowly                   Problem: Anxiety-Behavioral Health (Adult/Pediatric)  Goal: *STG: Remains safe in hospital  Outcome: Progressing Towards Goal  Goal: *STG: Seeks staff when feelings of anxiety and fear arise  Outcome: Resolved/Met  Goal: *STG: Attends activities and groups  Outcome: Resolved/Met  Goal: *STG/LTG: Complies with medication therapy  Outcome: Progressing Towards Goal

## 2022-08-26 NOTE — BH NOTES
Affect constricted, mood depressed/anxious. Alert and Oriented X 4. Cooperative and guarded. Attended and participated in group. Interacted with staff and peers. Makes needs known. Ate asnacks. Denied SI/HI/AVH and pain. Medication compliant. Stable with no s/s of distress.   Laid in bed with eyes closed for 5 hours and 45 min hours        PRN Medication Documentation     Specific patient behavior that led to need for PRN medication:anxious  Staff interventions attempted prior to PRN being given:calming technique  PRN medication given: Klonopin 0.5 mg po at 1943  Patient response/effectiveness of PRN medication: tolerated     PRN Medication Documentation     Specific patient behavior that led to need for PRN medication:sleep  Staff interventions attempted prior to PRN being given: requested  PRN medication given: trazodone 100 mg po at 2106  Patient response/effectiveness of PRN medication: tolerated    PRN Medication Documentation    Specific patient behavior that led to need for PRN medication:Mildly anxious  Staff interventions attempted prior to PRN being given: calming technique   PRN medication given: vistaril 50 mg po at [address]  Patient response/effectiveness of PRN medication: tolerated    PRN Medication Documentation    Specific patient behavior that led to need for PRN medication: c/o constipation   Staff interventions attempted prior to PRN being given: increase fluids/fiber  PRN medication given: Dulcolax 5 mg po at 2106  Patient response/effectiveness of PRN medication: tolerated

## 2022-08-26 NOTE — BH NOTES
Affect blunted. Mood anxious/depressed. Alert and oriented x 3. Cooperative and med compliant. Quiet. Attended group activities as active participant with prompting. Med compliant. Sleepy much of morning, napping often. Isolative to room much of time. Denies SI/HI/AVH/pain.     PRN Medication Documentation    Specific patient behavior that led to need for PRN medication: constipation  Staff interventions attempted prior to PRN being given: assess, abd nondistended, bm 4 days ago  PRN medication given: Dulcolax 5 mg po given at [phone]  Patient response/effectiveness of PRN medication: pending    PRN Medication Documentation    Specific patient behavior that led to need for PRN medication: urge to stop smoking  Staff interventions attempted prior to PRN being given: assess  PRN medication given: Nicotine patch applied at 8851  Patient response/effectiveness of PRN medication: decreased anxiety

## 2022-08-26 NOTE — PROGRESS NOTES
INTERVAL Hx:  Records and clinical information were reviewed. States she's feeling slightly better affectively, but still fearful of how she'll cope once D/C'ed. Still spending most of her time in bed trying to sleep and is only minimally interactive within the milieu. She denies having any SE's with the Loxapine or other meds, although it could be contributing to her tiredness. However, she says she didn't sleep well (5.75 hours), likely due to sleeping during the day. Discussed D/C plans and she states she'll have to return to the trailer she just moved into. [CBZ]: 9.5 and K+: 4.4. MEDS:  Current Facility-Administered Medications   Medication Dose Route Frequency    bisacodyL (DULCOLAX) tablet 5 mg  5 mg Oral DAILY PRN    traZODone (DESYREL) tablet 100 mg  100 mg Oral QHS PRN    clonazePAM (KlonoPIN) tablet 0.5 mg  0.5 mg Oral BID PRN    loxapine (LOXITANE) capsule 50 mg  50 mg Oral QHS    ibuprofen (MOTRIN) tablet 400 mg  400 mg Oral Q8H PRN    magnesium hydroxide (MILK OF MAGNESIA) 400 mg/5 mL oral suspension 30 mL  30 mL Oral DAILY PRN    alum-mag hydroxide-simeth (MYLANTA) oral suspension 30 mL  30 mL Oral Q4H PRN    carBAMazepine (TEGretol) tablet 200 mg  200 mg Oral BID    cholecalciferol (VITAMIN D3) (1000 Units /25 mcg) tablet 5,000 Units  5,000 Units Oral Q7D    citalopram (CELEXA) tablet 40 mg  40 mg Oral DAILY    pantoprazole (PROTONIX) tablet 40 mg  40 mg Oral ACB    hydrOXYzine pamoate (VISTARIL) capsule 50 mg  50 mg Oral TID PRN    metroNIDAZOLE (FLAGYL) tablet 500 mg  500 mg Oral BID    nicotine (NICODERM CQ) 21 mg/24 hr patch 1 Patch  1 Patch TransDERmal DAILY PRN       VITALS: No data found.       LABS: .  Recent Results (from the past 24 hour(s))   METABOLIC PANEL, BASIC    Collection Time: 08/26/22  6:30 AM   Result Value Ref Range    Sodium 138 136 - 145 mmol/L    Potassium 4.4 3.5 - 5.1 mmol/L    Chloride 102 97 - 108 mmol/L    CO2 29 21 - 32 mmol/L    Anion gap 7 5 - 15 mmol/L    Glucose 108 (H) 65 - 100 mg/dL    BUN 8 6 - 20 MG/DL    Creatinine 0.69 0.55 - 1.02 MG/DL    BUN/Creatinine ratio 12 12 - 20      GFR est AA >60 >60 ml/min/1.73m2    GFR est non-AA >60 >60 ml/min/1.73m2    Calcium 8.9 8.5 - 10.1 MG/DL       MSE:   Appearance: casually dressed; fair grooming  Behavior: isolative but cooperative; no agitation  Motor: less restless  Mood/Affect: minimally dysphoric; less labile  Thought Process: coherent; organized  Thought Content: no delusions; no SI/HI  Perceptions: no hallucinations  Insight/Judgment: fair    ASSESSMENT:   1. Bipolar disorder, depressed, severe (F31.4)  2. Cocaine Use, episodic (F14.10)    PLAN:  1. Continue the Loxapine at 50 mg QHS--may have to decrease if sedation persists. 2.  Continue the Tegretol at 200 mg BID. 3.  Continue the Celexa at 40 mg daily. 4.  Continue the PRN Trazodone and Clonazepam.  5.  ELOS: 3-4 days--F/U with Dr. Juan M Belcher in [address] [place] [address].

## 2022-08-26 NOTE — BH NOTES
Shift change report given to Jesse Wright re: SBAR, medications, and behavior.   Gustavo fall risk score 1

## 2022-08-26 NOTE — PROGRESS NOTES
08/26/22 [facility] meeting   Number of participants 6   Time in 1030   Time out 1130   Total Time 60   MTP problem Depression; Thought disorder/psychosis   Interventions/techniques Supported;Provided feedback   Follows directions Followed directions   Interactions Interacted appropriately   Mental Status Calm;Depressed   Behavior/appearance Cooperative; Attentive   Suicide Risk Factors denies wanting to harm self/others   Suicide Protective Factors Denies intent   Risk of Trauma Low   Goals Achieved Able to engage in interactions; Able to listen to others; Able to receive feedback   Denied wanting to harm self/others. Denied feeling anxious/depressed. Denied pain.

## 2022-08-26 NOTE — PROGRESS NOTES
08/26/22 500 [facility] meeting   Attendance Attended   Number of participants 6   Time in 2000   Time out 2045   Total Time 45   MTP problem Depression   Interventions/techniques Informed   Follows directions Followed directions   Interactions Interacted appropriately   Mental Status Calm;Depressed; Worried   Behavior/appearance Attentive; Cooperative   Suicide Risk Factors no thoughts of hurting herself nor others

## 2022-08-26 NOTE — PROGRESS NOTES
Problem: Anxiety-Behavioral Health (Adult/Pediatric)  Goal: *STG: Remains safe in hospital  Outcome: Progressing Towards Goal  Note: Affect blunted. Mood anxious/depressed. Alert and oriented x 3. Cooperative and med compliant. Quiet. Attended group activities as active participant with prompting. Med compliant. Sleepy much of morning, napping often. Isolative to room much of time. Denies SI/HI/AVH/pain.

## 2022-08-27 PROCEDURE — 65220000003 HC RM SEMIPRIVATE PSYCH

## 2022-08-27 PROCEDURE — 74011250637 HC RX REV CODE- 250/637: Performed by: INTERNAL MEDICINE

## 2022-08-27 PROCEDURE — 74011250637 HC RX REV CODE- 250/637: Performed by: PSYCHIATRY & NEUROLOGY

## 2022-08-27 PROCEDURE — 74011250637 HC RX REV CODE- 250/637: Performed by: NURSE PRACTITIONER

## 2022-08-27 RX ORDER — POLYETHYLENE GLYCOL 3350 17 G/17G
17 POWDER, FOR SOLUTION ORAL DAILY PRN
Status: DISCONTINUED | OUTPATIENT
Start: 2022-08-27 | End: 2022-08-30 | Stop reason: HOSPADM

## 2022-08-27 RX ORDER — MAGNESIUM CITRATE
296 SOLUTION, ORAL ORAL
Status: DISCONTINUED | OUTPATIENT
Start: 2022-08-27 | End: 2022-08-27

## 2022-08-27 RX ADMIN — LOXAPINE 50 MG: 25 CAPSULE ORAL at 21:19

## 2022-08-27 RX ADMIN — CARBAMAZEPINE 200 MG: 200 TABLET ORAL at 09:30

## 2022-08-27 RX ADMIN — METRONIDAZOLE 500 MG: 500 TABLET ORAL at 17:31

## 2022-08-27 RX ADMIN — CITALOPRAM HYDROBROMIDE 40 MG: 20 TABLET ORAL at 09:33

## 2022-08-27 RX ADMIN — METRONIDAZOLE 500 MG: 500 TABLET ORAL at 09:30

## 2022-08-27 RX ADMIN — TRAZODONE HYDROCHLORIDE 100 MG: 100 TABLET ORAL at 21:19

## 2022-08-27 RX ADMIN — CARBAMAZEPINE 200 MG: 200 TABLET ORAL at 17:31

## 2022-08-27 RX ADMIN — PANTOPRAZOLE SODIUM 40 MG: 40 TABLET, DELAYED RELEASE ORAL at 06:38

## 2022-08-27 RX ADMIN — CLONAZEPAM 0.5 MG: 0.5 TABLET ORAL at 21:19

## 2022-08-27 RX ADMIN — POLYETHYLENE GLYCOL 3350 17 G: 17 POWDER, FOR SOLUTION ORAL at 16:29

## 2022-08-27 NOTE — BH NOTES
INTERVAL Hx:  Records and clinical information were reviewed. April states that she has been constipated and has not been able to use the restroom in 6 days. She denies SI/HI/AVH currently. She endorses increased sleep. She states that her mood is improving. She denies any side effects from the medication. Mag citrate ordered- per pharmacy it has been recalled. Ordered miralax instead. MEDS:         Current Facility-Administered Medications   Medication Dose Route Frequency    bisacodyL (DULCOLAX) tablet 5 mg  5 mg Oral DAILY PRN    traZODone (DESYREL) tablet 100 mg  100 mg Oral QHS PRN    clonazePAM (KlonoPIN) tablet 0.5 mg  0.5 mg Oral BID PRN    loxapine (LOXITANE) capsule 50 mg  50 mg Oral QHS    ibuprofen (MOTRIN) tablet 400 mg  400 mg Oral Q8H PRN    magnesium hydroxide (MILK OF MAGNESIA) 400 mg/5 mL oral suspension 30 mL  30 mL Oral DAILY PRN    alum-mag hydroxide-simeth (MYLANTA) oral suspension 30 mL  30 mL Oral Q4H PRN    carBAMazepine (TEGretol) tablet 200 mg  200 mg Oral BID    cholecalciferol (VITAMIN D3) (1000 Units /25 mcg) tablet 5,000 Units  5,000 Units Oral Q7D    citalopram (CELEXA) tablet 40 mg  40 mg Oral DAILY    pantoprazole (PROTONIX) tablet 40 mg  40 mg Oral ACB    hydrOXYzine pamoate (VISTARIL) capsule 50 mg  50 mg Oral TID PRN    metroNIDAZOLE (FLAGYL) tablet 500 mg  500 mg Oral BID    nicotine (NICODERM CQ) 21 mg/24 hr patch 1 Patch  1 Patch TransDERmal DAILY PRN         VITALS: No data found.         LABS: .  Recent Results         Recent Results (from the past 24 hour(s))   METABOLIC PANEL, BASIC     Collection Time: 08/26/22  6:30 AM   Result Value Ref Range     Sodium 138 136 - 145 mmol/L     Potassium 4.4 3.5 - 5.1 mmol/L     Chloride 102 97 - 108 mmol/L     CO2 29 21 - 32 mmol/L     Anion gap 7 5 - 15 mmol/L     Glucose 108 (H) 65 - 100 mg/dL     BUN 8 6 - 20 MG/DL     Creatinine 0.69 0.55 - 1.02 MG/DL     BUN/Creatinine ratio 12 12 - 20       GFR est AA >60 >60 ml/min/1.73m2 GFR est non-AA >60 >60 ml/min/1.73m2     Calcium 8.9 8.5 - 10.1 MG/DL            MSE:   Appearance: casually dressed; fair grooming  Behavior: isolative but cooperative; no agitation  Motor: less restless  Mood/Affect: minimally dysphoric; less labile  Thought Process: coherent; organized  Thought Content: no delusions; no SI/HI  Perceptions: no hallucinations  Insight/Judgment: fair     ASSESSMENT:   1. Bipolar disorder, depressed, severe (F31.4)  2. Cocaine Use, episodic (F14.10)     PLAN:  1. Continue the Loxapine at 50 mg QHS--may have to decrease if sedation persists. 2.  Continue the Tegretol at 200 mg BID. 3.  Continue the Celexa at 40 mg daily. 4.  Continue the PRN Trazodone and Clonazepam.  5.  ELOS: 3-4 days--F/U with Dr. Jaspal Hollins in [address] [place] [address].

## 2022-08-27 NOTE — PROGRESS NOTES
08/26/22 2128 2000 [facility] meeting   Attendance Attended   Number of participants 4   Time in 2030   Time out 2045   Total Time 15   MTP problem Anxiety management   Interventions/techniques Supported   Follows directions Followed directions   Interactions Interacted appropriately   Mental Status Calm   Behavior/appearance Cooperative   Suicide Risk Factors No thoughts of suicide. Suicide Protective Factors Denies intent   Goals Achieved Able to engage in interactions     She has anxiety at a 5 with no depression or pain.

## 2022-08-27 NOTE — ROUTINE PROCESS
Shift change report given to Nickie Covington Rn, re: SBAR. Medications, and behavior.   Gustavo Fall Risk Score (1)

## 2022-08-27 NOTE — ROUTINE PROCESS
Shift change report given to Annemarie Laird, re: MACAR. Medications, and behavior.   Gustavo Fall Risk Score (1)

## 2022-08-27 NOTE — PROGRESS NOTES
Problem: Falls - Risk of  Goal: *Absence of Falls  Description: Document Fam[facility] Fall Risk and appropriate interventions in the flowsheet.   Outcome: Progressing Towards Goal  Note: Fall Risk Interventions:            Medication Interventions: Teach patient to arise slowly                   Problem: Anxiety-Behavioral Health (Adult/Pediatric)  Goal: *STG: Remains safe in hospital  Outcome: Progressing Towards Goal  Goal: *STG/LTG: Complies with medication therapy  Outcome: Progressing Towards Goal     Problem: Tobacco Use  Goal: *Tobacco use abstinence  Outcome: Progressing Towards Goal     Problem: Depressed Mood (Adult/Pediatric)  Goal: *STG: Attends activities and groups  Outcome: Progressing Towards Goal  Goal: *STG: Remains safe in hospital  Outcome: Progressing Towards Goal

## 2022-08-27 NOTE — MASTER TREATMENT PLAN
Master Treatment Plan Review for April Anita Pack    Date of Admission Date Treatment Plan Reviewed Anticipated Date of Discharge Legal Status    08/22/2022 08/26/2022  Voluntary     Treatment & Discharge Planning Changes    Modality or Intervention Changes 08/24/2022- Carbamazepine Lab  79/89/6042- Metabolic Panel, Basic     Psychotropic Medication Changes 08/25/2022- Now dose of Potassium Chloride SR 30 mEq PO   Discharge Planning or Target Date Changes ELOS: 3-4 days-- F/U with Dr. Liseth Cho in [address] [place] [address]    I have participated in the development of this plan of treatment and agree to its implementation.     Patient Signature       Patient Printed Name Date/Time   /Therapist Signature       /Therapist Printed Name Date/Time   RN Signature       RN Printed Name  Yessenia Awad, [facility] Date/Time  08/26/2022  @9934   Unit  Signature       Unit  Printed Name Date/Time   MD Signature       MD Printed Name Date/Time

## 2022-08-27 NOTE — BH NOTES
Affect blunted. Mood anxious/depressed. Alert and oriented x 3. Nicotine patch (applied at 1535) helping to \"take the urge off not smoking. \"  C/o constipation and abd mod distended. Encouraged to drink more h20, walk more and NP notified. Orders received. Would not walk in prado to ease gas pains. Did not attend group activity as she chose to sleep. Denies SI/HI/AVH/pain. Denies SI/HI/AVH. Ate well.     PRN Medication Documentation    Specific patient behavior that led to need for PRN medication: constipation  Staff interventions attempted prior to PRN being given: assess, + flatus, mod abd distension  PRN medication given: Miralax one dose given at 1629  Patient response/effectiveness of PRN medication: pending

## 2022-08-27 NOTE — BH NOTES
Affect blunted, mood anxious. Pt attended and participated in scheduled group activities. Pt was social with staff and peers. Pt ate 100% of snack. Pt vital signs stable with no complaints of pain. Pt denies SI/HI/AVH. Pt was given printed education on her medications this evening per her request. Pt was compliant with medications and requested a PRN. Pt is in no apparent distress at this time and made no delusional statements. Pt rested in her bed with her eyes closed for 7.5 hours.     PRN Medication Documentation    Specific patient behavior that led to need for PRN medication: anxiety  Staff interventions attempted prior to PRN being given: Pt request  PRN medication given: Klonopin 0.5 mg PO @ 1945  Patient response/effectiveness of PRN medication: effective     PRN Medication Documentation    Specific patient behavior that led to need for PRN medication: restless  Staff interventions attempted prior to PRN being given: Pt request  PRN medication given: Trazodone 100 mg PO @ 2115  Patient response/effectiveness of PRN medication: Pt rested

## 2022-08-27 NOTE — PROGRESS NOTES
Problem: Anxiety-Behavioral Health (Adult/Pediatric)  Goal: *STG/LTG: Complies with medication therapy  Outcome: Progressing Towards Goal  Note: Affect blunted. Mood anxious/depressed. Alert and oriented x 3. C/o constipation and abd mod distended. Encouraged to drink more h20, walk more and NP notified. Orders received. Did not attend group activity as she chose to sleep. Denies SI/HI/AVH/pain. Ate well.

## 2022-08-27 NOTE — PROGRESS NOTES
Problem: Falls - Risk of  Goal: *Absence of Falls  Description: Document Cain Led Fall Risk and appropriate interventions in the flowsheet.   Outcome: Progressing Towards Goal  Note: Fall Risk Interventions:            Medication Interventions: Teach patient to arise slowly                   Problem: Anxiety-Behavioral Health (Adult/Pediatric)  Goal: *STG: Remains safe in hospital  Outcome: Progressing Towards Goal  Goal: *STG/LTG: Complies with medication therapy  Outcome: Progressing Towards Goal     Problem: Depressed Mood (Adult/Pediatric)  Goal: *STG: Attends activities and groups  Outcome: Progressing Towards Goal  Goal: *STG: Remains safe in hospital  Outcome: Progressing Towards Goal  Goal: *STG: Complies with medication therapy  Outcome: Progressing Towards Goal     Problem: Tobacco Use  Goal: *Tobacco use abstinence  Outcome: Progressing Towards Goal

## 2022-08-28 PROCEDURE — 74011250637 HC RX REV CODE- 250/637: Performed by: INTERNAL MEDICINE

## 2022-08-28 PROCEDURE — 74011250637 HC RX REV CODE- 250/637: Performed by: PSYCHIATRY & NEUROLOGY

## 2022-08-28 PROCEDURE — 65220000003 HC RM SEMIPRIVATE PSYCH

## 2022-08-28 PROCEDURE — 74011250637 HC RX REV CODE- 250/637: Performed by: NURSE PRACTITIONER

## 2022-08-28 RX ORDER — FACIAL-BODY WIPES
10 EACH TOPICAL DAILY PRN
Status: DISCONTINUED | OUTPATIENT
Start: 2022-08-28 | End: 2022-08-30 | Stop reason: HOSPADM

## 2022-08-28 RX ADMIN — METRONIDAZOLE 500 MG: 500 TABLET ORAL at 09:26

## 2022-08-28 RX ADMIN — CITALOPRAM HYDROBROMIDE 40 MG: 20 TABLET ORAL at 09:26

## 2022-08-28 RX ADMIN — CARBAMAZEPINE 200 MG: 200 TABLET ORAL at 09:26

## 2022-08-28 RX ADMIN — CARBAMAZEPINE 200 MG: 200 TABLET ORAL at 17:29

## 2022-08-28 RX ADMIN — BISACODYL 10 MG: 10 SUPPOSITORY RECTAL at 14:07

## 2022-08-28 RX ADMIN — CLONAZEPAM 0.5 MG: 0.5 TABLET ORAL at 19:18

## 2022-08-28 RX ADMIN — LOXAPINE 50 MG: 25 CAPSULE ORAL at 21:09

## 2022-08-28 RX ADMIN — METRONIDAZOLE 500 MG: 500 TABLET ORAL at 17:29

## 2022-08-28 RX ADMIN — POLYETHYLENE GLYCOL 3350 17 G: 17 POWDER, FOR SOLUTION ORAL at 10:35

## 2022-08-28 RX ADMIN — IBUPROFEN 400 MG: 400 TABLET, FILM COATED ORAL at 21:09

## 2022-08-28 RX ADMIN — TRAZODONE HYDROCHLORIDE 100 MG: 100 TABLET ORAL at 21:09

## 2022-08-28 RX ADMIN — PANTOPRAZOLE SODIUM 40 MG: 40 TABLET, DELAYED RELEASE ORAL at 06:44

## 2022-08-28 NOTE — BH NOTES
INTERVAL Hx:  Records and clinical information were reviewed. April states that she has had 2 doses of miralax, but has still been unable to have a bowel movement. She denies SI/Hi/AVH currently. She states that her mood is good. She endorses eating and sleeping well. Will add dulcolax suppository. MEDS:              Current Facility-Administered Medications   Medication Dose Route Frequency    bisacodyL (DULCOLAX) tablet 5 mg  5 mg Oral DAILY PRN    traZODone (DESYREL) tablet 100 mg  100 mg Oral QHS PRN    clonazePAM (KlonoPIN) tablet 0.5 mg  0.5 mg Oral BID PRN    loxapine (LOXITANE) capsule 50 mg  50 mg Oral QHS    ibuprofen (MOTRIN) tablet 400 mg  400 mg Oral Q8H PRN    magnesium hydroxide (MILK OF MAGNESIA) 400 mg/5 mL oral suspension 30 mL  30 mL Oral DAILY PRN    alum-mag hydroxide-simeth (MYLANTA) oral suspension 30 mL  30 mL Oral Q4H PRN    carBAMazepine (TEGretol) tablet 200 mg  200 mg Oral BID    cholecalciferol (VITAMIN D3) (1000 Units /25 mcg) tablet 5,000 Units  5,000 Units Oral Q7D    citalopram (CELEXA) tablet 40 mg  40 mg Oral DAILY    pantoprazole (PROTONIX) tablet 40 mg  40 mg Oral ACB    hydrOXYzine pamoate (VISTARIL) capsule 50 mg  50 mg Oral TID PRN    metroNIDAZOLE (FLAGYL) tablet 500 mg  500 mg Oral BID    nicotine (NICODERM CQ) 21 mg/24 hr patch 1 Patch  1 Patch TransDERmal DAILY PRN         VITALS: No data found.         LABS: .  Recent Results             Recent Results (from the past 24 hour(s))   METABOLIC PANEL, BASIC     Collection Time: 08/26/22  6:30 AM   Result Value Ref Range     Sodium 138 136 - 145 mmol/L     Potassium 4.4 3.5 - 5.1 mmol/L     Chloride 102 97 - 108 mmol/L     CO2 29 21 - 32 mmol/L     Anion gap 7 5 - 15 mmol/L     Glucose 108 (H) 65 - 100 mg/dL     BUN 8 6 - 20 MG/DL     Creatinine 0.69 0.55 - 1.02 MG/DL     BUN/Creatinine ratio 12 12 - 20       GFR est AA >60 >60 ml/min/1.73m2     GFR est non-AA >60 >60 ml/min/1.73m2     Calcium 8.9 8.5 - 10.1 MG/DL MSE:   Appearance: casually dressed; fair grooming  Behavior: isolative but cooperative; no agitation  Motor: less restless  Mood/Affect: minimally dysphoric; less labile  Thought Process: coherent; organized  Thought Content: no delusions; no SI/HI  Perceptions: no hallucinations  Insight/Judgment: fair     ASSESSMENT:   1. Bipolar disorder, depressed, severe (F31.4)  2. Cocaine Use, episodic (F14.10)     PLAN:  1. Continue the Loxapine at 50 mg QHS--may have to decrease if sedation persists. 2.  Continue the Tegretol at 200 mg BID. 3.  Continue the Celexa at 40 mg daily. 4.  Continue the PRN Trazodone and Clonazepam.  5.  ELOS: 3-4 days--F/U with Dr. Clinton Webb in [address] [place] [address].

## 2022-08-28 NOTE — BH NOTES
Pt did not attend recreational was resting during this time. Today's group was watching a movie \"The Pursuit of Happyness\". Pt was encouraged to attend the next group session.

## 2022-08-28 NOTE — BH NOTES
Has not has a BM in 7 days, despite Dulcolax, MOM, Miralax x  doses in last 3 days. Dulcolax suppository 10mg given at 1407. Abd mod distended with +BS and flatus. Encouraged to walk more and to drink more water. Sleeps often. Eating well. VSS. Med compliant. Did not attend group activity. Denies SI/HI/AVH. Isolative and quiet. States she is not depressed.   PRN Medication Documentation    Specific patient behavior that led to need for PRN medication: constipation  Staff interventions attempted prior to PRN being given: assess  PRN medication given: Miralax 1 dose 1035  Patient response/effectiveness of PRN medication: pending   PRN Medication Documentation    Specific patient behavior that led to need for PRN medication: constipation  Staff interventions attempted prior to PRN being given: assess, notify NP  PRN medication given: Dulcolax suppository pr given a 1407 10mg  Patient response/effectiveness of PRN medication: no bm yet, increased bs

## 2022-08-28 NOTE — ROUTINE PROCESS
Shift change report given to Cass Sam, re: MACAR. Medications, and behavior.   Gustavo Fall Risk Score (1)

## 2022-08-28 NOTE — BH NOTES
Affect blunted, mood depressed. Pt attended and participated in scheduled group activities. Pt was social with staff and peers. Pt vital signs stable with complaints of abdominal discomfort due to constipation. Pt denies SI/HI/AVH. Pt was compliant with medications and requested a PRN. Pt is in no apparent distress at this time and made no delusional statements. Pt rested in her bed with her eyes closed for 4 hours.     PRN Medication Documentation    Specific patient behavior that led to need for PRN medication: anxious  Staff interventions attempted prior to PRN being given: Pt request  PRN medication given: Klonopin 0.5 mg PO @ 2119  Patient response/effectiveness of PRN medication: Pt rested     PRN Medication Documentation    Specific patient behavior that led to need for PRN medication: restless  Staff interventions attempted prior to PRN being given: Pt request  PRN medication given: Trazodone 100 mg PO @ 2119  Patient response/effectiveness of PRN medication:Pt rested

## 2022-08-28 NOTE — PROGRESS NOTES
Problem: Constipation - Risk of  Goal: *Prevention of constipation  Outcome: Progressing Towards Goal  Note: Has not has a BM in 7 days, despite Dulcolax, MOM, Miralax x  doses in last 3 days. Dulcolax suppository 10mg given at 1407. Abd mod distended with +BS and flatus. Encouraged to walk more and to drink more water. Sleeps often. Eating well. VSS. Med compliant. Did not attend group activity.

## 2022-08-28 NOTE — PROGRESS NOTES
Problem: Falls - Risk of  Goal: *Absence of Falls  Description: Document Alejandro Almanza Fall Risk and appropriate interventions in the flowsheet.   Outcome: Progressing Towards Goal  Note: Fall Risk Interventions:            Medication Interventions: Teach patient to arise slowly                   Problem: Anxiety-Behavioral Health (Adult/Pediatric)  Goal: *STG: Remains safe in hospital  Outcome: Progressing Towards Goal  Goal: *STG/LTG: Complies with medication therapy  Outcome: Progressing Towards Goal     Problem: Tobacco Use  Goal: *Tobacco use abstinence  Outcome: Progressing Towards Goal     Problem: Depressed Mood (Adult/Pediatric)  Goal: *STG: Remains safe in hospital  Outcome: Progressing Towards Goal  Goal: *STG: Complies with medication therapy  Outcome: Progressing Towards Goal

## 2022-08-29 PROCEDURE — 74011250637 HC RX REV CODE- 250/637: Performed by: PSYCHIATRY & NEUROLOGY

## 2022-08-29 PROCEDURE — 99232 SBSQ HOSP IP/OBS MODERATE 35: CPT | Performed by: PSYCHIATRY & NEUROLOGY

## 2022-08-29 PROCEDURE — 74011250637 HC RX REV CODE- 250/637: Performed by: NURSE PRACTITIONER

## 2022-08-29 PROCEDURE — 65220000003 HC RM SEMIPRIVATE PSYCH

## 2022-08-29 PROCEDURE — 74011250637 HC RX REV CODE- 250/637: Performed by: INTERNAL MEDICINE

## 2022-08-29 RX ADMIN — BISACODYL 5 MG: 5 TABLET, COATED ORAL at 08:59

## 2022-08-29 RX ADMIN — CARBAMAZEPINE 200 MG: 200 TABLET ORAL at 17:44

## 2022-08-29 RX ADMIN — CLONAZEPAM 0.5 MG: 0.5 TABLET ORAL at 12:17

## 2022-08-29 RX ADMIN — HYDROXYZINE PAMOATE 50 MG: 50 CAPSULE ORAL at 20:44

## 2022-08-29 RX ADMIN — Medication 5000 UNITS: at 17:44

## 2022-08-29 RX ADMIN — IBUPROFEN 400 MG: 400 TABLET, FILM COATED ORAL at 20:30

## 2022-08-29 RX ADMIN — METRONIDAZOLE 500 MG: 500 TABLET ORAL at 08:59

## 2022-08-29 RX ADMIN — TRAZODONE HYDROCHLORIDE 100 MG: 100 TABLET ORAL at 20:30

## 2022-08-29 RX ADMIN — CARBAMAZEPINE 200 MG: 200 TABLET ORAL at 08:59

## 2022-08-29 RX ADMIN — LOXAPINE 50 MG: 25 CAPSULE ORAL at 20:30

## 2022-08-29 RX ADMIN — PANTOPRAZOLE SODIUM 40 MG: 40 TABLET, DELAYED RELEASE ORAL at 06:38

## 2022-08-29 RX ADMIN — CITALOPRAM HYDROBROMIDE 40 MG: 20 TABLET ORAL at 08:59

## 2022-08-29 RX ADMIN — POLYETHYLENE GLYCOL 3350 17 G: 17 POWDER, FOR SOLUTION ORAL at 13:46

## 2022-08-29 RX ADMIN — LACTULOSE 45 ML: 20 SOLUTION ORAL at 17:45

## 2022-08-29 NOTE — BH NOTES
Shift change report given to Everet Prude re: SBAR, medications, and behavior.   Gustavo fall risk score:  1

## 2022-08-29 NOTE — BH NOTES
Affect blunted/constricted, mood depressed/anxious. Alert and oriented x 4. Denies pain. No SI/HI/AVH. No delusional or paranoid statements made. Patient cooperative and pleasant. Medication compliant and PRN's given. Appetite good eats 100% of all meals plus snacks. Patient attended and engaged in groups with prompting. Patient continue to be constipated and last bowel movement 08/22/22. Patient abdominal distended but bowel sounds present in all 4 quads. MD aware and ordered lactulose 45 mL po daily for constipation. Patient in no apparent distress. Vitals signs within normal limits. PRN Medication Documentation    Specific patient behavior that led to need for PRN medication: Patient c/o being constipated and requested PRN Dulcolax. Staff interventions attempted prior to PRN being given: Assessment done  PRN medication given: Dulcolax 5 mg po at 0859. Patient response/effectiveness of PRN medication: Results pending. PRN Medication Documentation    Specific patient behavior that led to need for PRN medication: Patient requested Nicotine patch to help decrease smoking cessation. Staff interventions attempted prior to PRN being given: Assessment done. PRN medication given: Nicotine 21 mg applied at 0900. Patient response/effectiveness of PRN medication: Positive effects. PRN Medication Documentation    Specific patient behavior that led to need for PRN medication: Patient requested PRN Klonopin for anxiety. Staff interventions attempted prior to PRN being given: Assessment done  PRN medication given: Klonopin 0.5 mg po at 1217. Patient response/effectiveness of PRN medication: Positive effects. PRN Medication Documentation    Specific patient behavior that led to need for PRN medication: Patient c/o constipation and requested PRN Miralax. Staff interventions attempted prior to PRN being given: Assessment done. PRN medication given: Miralax 17 g at 1346.   Patient response/effectiveness of PRN medication: Results pending.

## 2022-08-29 NOTE — BH NOTES
Affect blunted, mood depressed. Pt attended and participated in scheduled group activities. Pt was social with staff and peers. Pt vital signs stable with complaints of abdominal discomfort due to constipation. Pt ate 100% of snack. Pt was encouraged to walk on the unit and move around to promote active bowels. Pt's stomach remains distended. Pt denies SI/HI/AVH. Pt was compliant with medications and requested PRN's. Pt is in no apparent distress at this time and made no delusional statements. Pt rested in her bed with her eyes closed for 6.75 hours.     PRN Medication Documentation    Specific patient behavior that led to need for PRN medication: anxious  Staff interventions attempted prior to PRN being given: Pt request  PRN medication given: Klonopin 0.5 mg PO @ 1918  Patient response/effectiveness of PRN medication: effective     PRN Medication Documentation    Specific patient behavior that led to need for PRN medication: restless  Staff interventions attempted prior to PRN being given: Pt request  PRN medication given: Trazodone 100 mg PO @ 2109  Patient response/effectiveness of PRN medication: effective    PRN Medication Documentation    Specific patient behavior that led to need for PRN medication: abdominal pain  Staff interventions attempted prior to PRN being given: Pt request  PRN medication given: Motrin 400 mg PO @ 2109  Patient response/effectiveness of PRN medication: effective

## 2022-08-29 NOTE — GROUP NOTE
[facility]  GROUP DOCUMENTATION INDIVIDUAL                                                                          Group Therapy Note    Date: 8/29/2022    Group Start Time: 0900  Group End Time: 6335  Group Topic: Process Group - Inpatient    [address], [address] GROUP DOCUMENTATION GROUP    Group Therapy Note    Attendees: Focus of session was on issues and emotional baggage from the past and how it may continue to be impacting our functioning today. We discussed how we can have anger towards family members and that may have been what has protected us from getting hurt again. I spoke with group about the need to accept other people's limitations and how if we don't accept that, then we get continuously hurt and angry towards others. We spoke about how we have to move forward and that may include accepting a need to forgive others and to accept them as they are. If we cannot be around them much, then that is how they take care of themselves. Group members were asked to share something that they can let go of today from the past so they can lighten their burden one step at a time. Attendance: Attended    Patient's Goal:      Verbalizes anger, guilt, and other feelings in a constructive manor           Interventions/techniques: Informed, Validated, Promoted peer support, and Supported    Follows Directions: Followed directions    Interactions: Interacted appropriately    Mental Status: Anxious, Congruent, Preoccupied, and Worried    Behavior/appearance: Attentive, Cooperative, and Needed prompting    Goals Achieved: Able to engage in interactions, Able to reflect/comment on own behavior, Able to manage/cope with feelings, Able to receive feedback, Identified feelings, Identified triggers, and Identified relapse prevention strategies      Additional Notes:  April participated in group with prompting.   She spoke about how she knows that she is not feeling well and this has been a distraction for her. She spoke about how chaotic it was on the unit with another patient and she can see she has a level of patience she was not aware she had. She spoke about how she is feeling better emotionally but she knows that one of her self sabotaging behaviors is \"I can go off in a rage. \"  We spoke about how she was able to manage this weekend well in a very loud environment.       Pawan Toledo

## 2022-08-29 NOTE — PROGRESS NOTES
Problem: Anxiety-Behavioral Health (Adult/Pediatric)  Goal: *STG: Remains safe in hospital  Outcome: Progressing Towards Goal  Goal: *STG/LTG: Complies with medication therapy  Outcome: Progressing Towards Goal     Problem: Patient Education: Go to Patient Education Activity  Goal: Patient/Family Education  Outcome: Progressing Towards Goal     Problem: Anxiety-Behavioral Health (Adult/Pediatric)  Goal: *STG: Participates in treatment plan  Outcome: Not Met

## 2022-08-29 NOTE — PROGRESS NOTES
Pt goal was to have a bowel movement   Complains of stomach and back pain 8 out of 10  Anxiety level 5  Pt is not depressed       08/29/22 [address] [facility] meeting   Attendance Attended   Number of participants 6   Time in 1000   Time out 1100   Total Time 60   Interventions/techniques Supported   Follows directions Followed directions   Interactions Interacted appropriately   Mental Status Calm   Behavior/appearance Attentive; Cooperative   Long Term Risk of Suicide Low   Immediate Risk for Suicide Low   Suicide Protective Factors Denies intent   Risk of Violence Low   Risk of Trauma Low   Goals Achieved Able to engage in interactions; Able to receive feedback; Able to experience relief/decrease in symptoms; Able to listen to others; Able to give feedback to another;Able to self-disclose; Able to reflect/comment on own behavior;Able to manage/cope with feelings

## 2022-08-29 NOTE — BH NOTES
Behavioral Health Interdisciplinary Rounds     Patient Name: Jennifer Gu  Age: 49 y.o. Room/Bed:  236/01  Primary Diagnosis: Severe depressed bipolar I disorder without psychotic features ([address]   Admission Status: Voluntary     Readmission within 30 days: no  Power of  in place: no  Patient requires a blocked bed: no          Reason for blocked bed:     VTE Prophylaxis: Not indicated    Mobility needs/Fall risk: yes  Flu Vaccine : no   Nutritional Plan: no  Consults: no         Labs/Testing due today?: no    Sleep hours: 6.75       Participation in Care/Groups:  yes  Medication Compliant?: Yes  PRNS (last 24 hours): Antianxiety, Sleep Aid, and Pain    Restraints (last 24 hours):  no     CIWA (range last 24 hours):     COWS (range last 24 hours):      Alcohol screening (AUDIT) completed -   AUDIT Score: 0     If applicable, date SBIRT discussed in treatment team AND documented:   AUDIT Screen Score: AUDIT Score: 0      Document Brief Intervention (corresponds directly with the 5 A's, Ask, Advise, Assess, Assist, and Arrange): At- Risk Patients (Score 7-15 for women; 8-15 for men)  Discuss concern patient is drinking at unhealthy levels known to increase risk of alcohol-related health problems. Is Patient ready to commit to change? If No:  Encourage reflection  Discuss short term and long term health risks of consuming alcohol  Barriers to change  Reaffirm willingness to help / Educational materials provided  If Yes:  Set goal  Plan  Educational materials provided    Harmful use or Dependence (Score 16 or greater)  Discuss short term and long term health risks of consuming alcohol  Recommendations  Negotiate drinking goal  Recommend addiction specialist/center  Arrange follow-up appointments.     Tobacco - patient is a smoker: Have You Used Tobacco in the Past 30 Days: Yes  Illegal Drugs use: Have You Used Any Illegal Substances Over the Past 12 Months: No    24 hour chart check complete: yes Patient goal(s) for today: to be discharged soon  Treatment team focus/goals: Remains safe in hospital  Progress note patient will most likely be discharged to home on Wednesday she follows up with Dr. Cecilia Boyd    LOS:  7  Expected LOS: 1    Financial concerns/prescription coverage:  no  Family contact: no      Family requesting physician contact today:  no  Discharge plan: home with outpatient services  Access to weapons : no        Outpatient provider(s): Dr. Parmjit Lopez  Patient's preferred phone number for follow up call : [phone]     Participating treatment team members: Toribio Gomez, Jeanna Buckner, Dr. Dahiana Sams (assigned SW), Bety Ansari

## 2022-08-29 NOTE — PROGRESS NOTES
08/28/22 2126 2000 [facility]   Number of participants 6   Time in 2000   Time out 2030   Total Time 30   MTP problem Pain   Interventions/techniques Supported   Follows directions Followed directions   Interactions Interacted appropriately   Mental Status Calm   Behavior/appearance Cooperative   Suicide Risk Factors No thoughts of suicide. Suicide Protective Factors Denies intent   Goals Achieved Able to engage in interactions     She has no anxiety or depression at this time but has back pain at an 8.

## 2022-08-30 ENCOUNTER — APPOINTMENT (OUTPATIENT)
Dept: GENERAL RADIOLOGY | Age: 47
DRG: 753 | End: 2022-08-30
Attending: PSYCHIATRY & NEUROLOGY
Payer: MEDICAID

## 2022-08-30 VITALS
HEIGHT: 66 IN | OXYGEN SATURATION: 96 % | WEIGHT: 130 LBS | DIASTOLIC BLOOD PRESSURE: 54 MMHG | RESPIRATION RATE: 16 BRPM | BODY MASS INDEX: 20.89 KG/M2 | TEMPERATURE: 97.8 F | HEART RATE: 77 BPM | SYSTOLIC BLOOD PRESSURE: 108 MMHG

## 2022-08-30 PROCEDURE — 74011250637 HC RX REV CODE- 250/637: Performed by: PSYCHIATRY & NEUROLOGY

## 2022-08-30 PROCEDURE — 99239 HOSP IP/OBS DSCHRG MGMT >30: CPT | Performed by: PSYCHIATRY & NEUROLOGY

## 2022-08-30 PROCEDURE — 74018 RADEX ABDOMEN 1 VIEW: CPT

## 2022-08-30 RX ORDER — LOXAPINE SUCCINATE 50 MG/1
50 TABLET ORAL
Qty: 30 CAPSULE | Refills: 0 | Status: ON HOLD | OUTPATIENT
Start: 2022-08-30 | End: 2022-09-26 | Stop reason: SDUPTHER

## 2022-08-30 RX ORDER — CLONAZEPAM 0.5 MG/1
0.5 TABLET ORAL
Status: SHIPPED | COMMUNITY
Start: 2022-08-30 | End: 2022-09-26

## 2022-08-30 RX ORDER — TRAZODONE HYDROCHLORIDE 100 MG/1
100 TABLET ORAL
Qty: 30 TABLET | Refills: 0 | Status: ON HOLD | OUTPATIENT
Start: 2022-08-30 | End: 2022-09-26 | Stop reason: SDUPTHER

## 2022-08-30 RX ORDER — IBUPROFEN 200 MG
1 TABLET ORAL EVERY 24 HOURS
Qty: 30 PATCH | Refills: 0 | Status: ON HOLD | OUTPATIENT
Start: 2022-08-30 | End: 2022-09-26 | Stop reason: SDUPTHER

## 2022-08-30 RX ADMIN — CARBAMAZEPINE 200 MG: 200 TABLET ORAL at 08:28

## 2022-08-30 RX ADMIN — CITALOPRAM HYDROBROMIDE 40 MG: 20 TABLET ORAL at 08:28

## 2022-08-30 RX ADMIN — LACTULOSE 45 ML: 20 SOLUTION ORAL at 08:28

## 2022-08-30 RX ADMIN — PANTOPRAZOLE SODIUM 40 MG: 40 TABLET, DELAYED RELEASE ORAL at 06:40

## 2022-08-30 NOTE — PROGRESS NOTES
Behavioral Services                                              Medicare Re-Certification    I certify that the inpatient psychiatric hospital services furnished since the previous certification/re-certification were, and continue to be, medically necessary for;    [x] (1) Treatment which could reasonably be expected to improve the patient's condition,    [x] (2) Or for diagnostic study. Estimated length of stay/service 1-2 days    Plan for post-hospital care home    This patient continues to need, on a daily basis, active treatment furnished directly by or requiring the supervision of inpatient psychiatric personnel.     Electronically signed by Re Montague MD on 8/29/2022 at 9:03 PM

## 2022-08-30 NOTE — BH NOTES
Shift change report given to Anju Erwin RN re: SBAR, medications, and behavior.   Gustavo fall risk score 1

## 2022-08-30 NOTE — PROGRESS NOTES
Patient said she has had a not good day . 08/29/22 2128 2000 [facility] meeting   Attendance Attended   Number of participants 5   Time in 1950   Time out 2020   Total Time 30   MTP problem Fall risk;Pain   Interventions/techniques Provided feedback   Follows directions Followed directions   Interactions Interacted appropriately   Mental Status Worried; Anxious   Behavior/appearance Cooperative   Suicide Risk Factors No SI   Suicide Protective Factors Denies intent   Goals Achieved Able to listen to others; Able to reflect/comment on own behavior   Patient said she has lower back pain 4/10 .  Patient said she has no depression , no anxiety , and no SI .

## 2022-08-30 NOTE — BH NOTES
Affect constricted, mood depressed/anxious. Alert and Oriented X 4. Cooperative and guarded. Attended and participated in group. Interacted with staff and peers. Makes needs known. Ate asnacks. Denied SI/HI/AVH. Medication compliant. Stable with no s/s of distress.   Laid in bed with eyes closed for 8 hours          PRN Medication Documentation     Specific patient behavior that led to need for PRN medication:c/o 3/10 pain in lower back  Staff interventions attempted prior to PRN being given:repositioned  PRN medication given:motrin 400 mg po at 1943 2030  Patient response/effectiveness of PRN medication: pain 0/10     PRN Medication Documentation     Specific patient behavior that led to need for PRN medication:sleep  Staff interventions attempted prior to PRN being given: requested  PRN medication given: trazodone 100 mg po at 2030  Patient response/effectiveness of PRN medication:tolerated    PRN Medication Documentation     Specific patient behavior that led to need for PRN medication:Mildly anxious  Staff interventions attempted prior to PRN being given: calming technique   PRN medication given: vistaril 50 mg po at 2044  Patient response/effectiveness of PRN medication: tolerated

## 2022-08-30 NOTE — BH NOTES
Discharge Note:    Patient discharged home today. Patient Alert and [place] x 4. Patient cooperative and pleasant. Denies SI/HI/AVH and pain. No delusional statements made . Patient is compliant with medications and no PRN's given. Appetite good ate 100% breakfast, 50% lunch plus a snack. Patient continues to be constipated and  last bowel movent 08/22/2022. Patient abdominal distended and bowel sounds present x 4 quads. Patient had ABD XR today to rule out blockage. RN reviewed discharge instructions and orders with patient including: medication drug name, purpose, doses, administration times, and side effects. Patient verbalized understanding and provided written copies of discharge orders and instructions. Dr. Aaron Roe called prescriptions into [facility] in [place]. Patient stable and no signs or symptoms of distress. Patient had 2 large bowel movements before discharge. Nicotine patch removed @ 1200. Patient left the unit ambulatory @ 1210  Accompanied by staff. All personal valuables and belongings sent with patient.

## 2022-08-30 NOTE — BH NOTES
Behavioral Health Transition Record to Provider    Patient Name: Melvin Vitale  YOB: 1975  Medical Record Number: [id number]  Date of Admission: 8/22/2022  Date of Discharge: 8/30/22    Attending Provider: Carline Carrero MD  Discharging Provider: Allyson Frazier  To contact this individual call [phone] and ask the  to page. If unavailable, ask to be transferred to [facility] Provider on call. [facility] Provider will be available on call 24/7 and during holidays. Primary Care Provider: Marianna Gates MD    Allergies   Allergen Reactions    Oxycodone-Acetaminophen Hives     itching         Reason for Admission: worsening depression with suicidal thinking    Admission Diagnosis: B[facility] 1 [facility] [F31.9]    * No surgery found *    Results for orders placed or performed during the hospital encounter of 08/22/22   COVID-19 WITH INFLUENZA A/B   Result Value Ref Range    SARS-CoV-2 by PCR Not detected NOTD      Influenza A by PCR Not detected NOTD      Influenza B by PCR Not detected NOTD     CBC WITH AUTOMATED DIFF   Result Value Ref Range    WBC 9.5 3.6 - 11.0 K/uL    RBC 4.56 3.80 - 5.20 M/uL    HGB 14.6 11.5 - 16.0 g/dL    HCT 41.6 35.0 - 47.0 %    MCV 91.2 80.0 - 99.0 FL    MCH 32.0 26.0 - 34.0 PG    MCHC 35.1 30.0 - 36.5 g/dL    RDW 13.0 11.5 - 14.5 %    PLATELET 154 663 - 545 K/uL    MPV 9.4 8.9 - 12.9 FL    NRBC 0.0 0  WBC    ABSOLUTE NRBC 0.00 0.00 - 0.01 K/uL    NEUTROPHILS 58 32 - 75 %    LYMPHOCYTES 34 12 - 49 %    MONOCYTES 5 5 - 13 %    EOSINOPHILS 3 0 - 7 %    BASOPHILS 0 0 - 1 %    IMMATURE GRANULOCYTES 0 0.0 - 0.5 %    ABS. NEUTROPHILS 5.5 1.8 - 8.0 K/UL    ABS. LYMPHOCYTES 3.2 0.8 - 3.5 K/UL    ABS. MONOCYTES 0.5 0.0 - 1.0 K/UL    ABS. EOSINOPHILS 0.3 0.0 - 0.4 K/UL    ABS. BASOPHILS 0.0 0.0 - 0.1 K/UL    ABS. IMM.  GRANS. 0.0 0.00 - 0.04 K/UL    DF AUTOMATED     METABOLIC PANEL, COMPREHENSIVE   Result Value Ref Range    Sodium 140 136 - 145 mmol/L    Potassium 3.0 (L) 3.5 - 5.1 mmol/L    Chloride 102 97 - 108 mmol/L    CO2 28 21 - 32 mmol/L    Anion gap 10 5 - 15 mmol/L    Glucose 101 (H) 65 - 100 mg/dL    BUN 4 (L) 6 - 20 MG/DL    Creatinine 0.71 0.55 - 1.02 MG/DL    BUN/Creatinine ratio 6 (L) 12 - 20      GFR est AA >60 >60 ml/min/1.73m2    GFR est non-AA >60 >60 ml/min/1.73m2    Calcium 9.2 8.5 - 10.1 MG/DL    Bilirubin, total 0.4 0.2 - 1.0 MG/DL    ALT (SGPT) 23 12 - 78 U/L    AST (SGOT) 18 15 - 37 U/L    Alk.  phosphatase 105 45 - 117 U/L    Protein, total 7.6 6.4 - 8.2 g/dL    Albumin 3.7 3.5 - 5.0 g/dL    Globulin 3.9 2.0 - 4.0 g/dL    A-G Ratio 0.9 (L) 1.1 - 2.2     MAGNESIUM   Result Value Ref Range    Magnesium 1.7 1.6 - 2.4 mg/dL   ETHYL ALCOHOL   Result Value Ref Range    ALCOHOL(ETHYL),SERUM <10 <10 MG/DL   URINALYSIS W/ RFLX MICROSCOPIC   Result Value Ref Range    Color YELLOW/STRAW      Appearance CLEAR CLEAR      Specific gravity 1.006 1.003 - 1.030      pH (UA) 7.0 5.0 - 8.0      Protein Negative NEG mg/dL    Glucose Negative NEG mg/dL    Ketone Negative NEG mg/dL    Bilirubin Negative NEG      Blood Negative NEG      Urobilinogen 0.2 0.2 - 1.0 EU/dL    Nitrites Negative NEG      Leukocyte Esterase Negative NEG     DRUG SCREEN, URINE   Result Value Ref Range    AMPHETAMINES Negative NEG      BARBITURATES Negative NEG      BENZODIAZEPINES Negative NEG      COCAINE Positive (A) NEG      METHADONE Negative NEG      OPIATES Negative NEG      PCP(PHENCYCLIDINE) Negative NEG      THC (TH-CANNABINOL) Negative NEG      Drug screen comment (NOTE)    HCG URINE, QL   Result Value Ref Range    HCG urine, QL Negative NEG     TSH 3RD GENERATION   Result Value Ref Range    TSH 1.52 0.36 - 3.74 uIU/mL   T4, FREE   Result Value Ref Range    T4, Free 0.8 0.8 - 1.5 NG/DL   CELIAC PANEL   Result Value Ref Range    Deamidated Gliadin Ab, IgA 2 0 - 19 units    Deamidated Gliadin Ab, IgG 1 0 - 19 units    t-Transglutaminase, IgA <2 0 - 3 U/mL t-Transglutaminase, IgG <2 0 - 5 U/mL    Endomysial Ab, IgA Negative Negative      Immunoglobulin A, Qt. 121 87 - 352 mg/dL   CORTISOL   Result Value Ref Range    Cortisol, random 38.6 ug/dL   METABOLIC PANEL, BASIC   Result Value Ref Range    Sodium 142 136 - 145 mmol/L    Potassium 3.4 (L) 3.5 - 5.1 mmol/L    Chloride 104 97 - 108 mmol/L    CO2 29 21 - 32 mmol/L    Anion gap 9 5 - 15 mmol/L    Glucose 106 (H) 65 - 100 mg/dL    BUN 7 6 - 20 MG/DL    Creatinine 0.77 0.55 - 1.02 MG/DL    BUN/Creatinine ratio 9 (L) 12 - 20      GFR est AA >60 >60 ml/min/1.73m2    GFR est non-AA >60 >60 ml/min/1.73m2    Calcium 9.1 8.5 - 10.1 MG/DL   LIPID PANEL   Result Value Ref Range    Cholesterol, total 162 <200 MG/DL    Triglyceride 94 <150 MG/DL    HDL Cholesterol 38 MG/DL    LDL, calculated 105.2 (H) 0 - 100 MG/DL    VLDL, calculated 18.8 MG/DL    CHOL/HDL Ratio 4.3 0.0 - 5.0     HEMOGLOBIN A1C WITH EAG   Result Value Ref Range    Hemoglobin A1c 5.4 4.0 - 5.6 %    Est. average glucose 108 mg/dL   CARBAMAZEPINE   Result Value Ref Range    Carbamazepine 9.5 4.0 - 22.8 ug/mL   METABOLIC PANEL, BASIC   Result Value Ref Range    Sodium 138 136 - 145 mmol/L    Potassium 4.4 3.5 - 5.1 mmol/L    Chloride 102 97 - 108 mmol/L    CO2 29 21 - 32 mmol/L    Anion gap 7 5 - 15 mmol/L    Glucose 108 (H) 65 - 100 mg/dL    BUN 8 6 - 20 MG/DL    Creatinine 0.69 0.55 - 1.02 MG/DL    BUN/Creatinine ratio 12 12 - 20      GFR est AA >60 >60 ml/min/1.73m2    GFR est non-AA >60 >60 ml/min/1.73m2    Calcium 8.9 8.5 - 10.1 MG/DL       Immunizations administered during this encounter: There is no immunization history on file for this patient. Screening for Metabolic Disorders for Patients on Antipsychotic Medications  (Data obtained from the EMR)    Estimated Body Mass Index  Estimated body mass index is 20.98 kg/m² as calculated from the following:    Height as of this encounter: 5' 6\" (1.676 m). Weight as of this encounter: 59 kg (130 lb). Vital Signs/Blood Pressure  Visit Vitals  BP (!) 108/54 (BP 1 Location: Left arm, BP Patient Position: Sitting)   Pulse 77   Temp 97.8 °F (36.6 °C)   Resp 16   Ht 5' 6\" (1.676 m)   Wt 59 kg (130 lb)   SpO2 96%   Breastfeeding No   BMI 20.98 kg/m²       Blood Glucose/Hemoglobin A1c  Lab Results   Component Value Date/Time    Glucose 108 (H) 08/26/2022 06:30 AM       Lab Results   Component Value Date/Time    Hemoglobin A1c 5.4 08/23/2022 05:54 AM        Lipid Panel  Lab Results   Component Value Date/Time    Cholesterol, total 162 08/23/2022 05:54 AM    HDL Cholesterol 38 08/23/2022 05:54 AM    LDL, calculated 105.2 (H) 08/23/2022 05:54 AM    Triglyceride 94 08/23/2022 05:54 AM    CHOL/HDL Ratio 4.3 08/23/2022 05:54 AM        Discharge Diagnosis: Bipolar disorder, currently depressed, severe    Discharge Plan: Discharge plan of care/case management plan validated with provider discharge order. Discharge Medication List and Instructions:   Current Discharge Medication List        START taking these medications    Details   loxapine (LOXITANE) 50 mg capsule Take 1 Capsule by mouth nightly. Qty: 30 Capsule, Refills: 0  Start date: 8/30/2022      traZODone (DESYREL) 100 mg tablet Take 1 Tablet by mouth nightly as needed for Sleep. Qty: 30 Tablet, Refills: 0  Start date: 8/30/2022           CONTINUE these medications which have CHANGED    Details   clonazePAM (KlonoPIN) 0.5 mg tablet Take 1 Tablet by mouth two (2) times daily as needed for Anxiety. Max Daily Amount: 1 mg. Start date: 8/30/2022    Associated Diagnoses: Severe depressed bipolar I disorder without psychotic features ([facility]      nicotine (NICODERM CQ) 21 mg/24 hr 1 Patch by TransDERmal route every twenty-four (24) hours for 30 days.   Qty: 30 Patch, Refills: 0  Start date: 8/30/2022, End date: 9/29/2022           CONTINUE these medications which have NOT CHANGED    Details   carBAMazepine (TEGretol) 200 mg tablet 1 tablet Orally Twice a day citalopram (CELEXA) 40 mg tablet Take 40 mg by mouth daily. cholecalciferol (VITAMIN D3) (5000 Units/125 mcg) tab tablet Take  by mouth daily. pantoprazole (PROTONIX) 40 mg tablet TAKE 1 TABLET BY MOUTH ONCE DAILY           STOP taking these medications       ziprasidone (GEODON) 40 mg capsule Comments:   Reason for Stopping:         amitriptyline (ELAVIL) 150 mg tablet Comments:   Reason for Stopping:         sennosides (Laxative, sennosides,) 25 mg tab Comments:   Reason for Stopping:         cholecalciferol (VITAMIN D3) (1000 Units /25 mcg) tablet Comments:   Reason for Stopping:               Unresulted Labs (24h ago, onward)      None          To obtain results of studies pending at discharge, please contact     Follow-up Information       Follow up With Specialties Details Why Contact Info    Kelsie Moreno [facility]  [address] [place] [address]  [phone]              Advanced Directive:   Does the patient have an appointed surrogate decision maker? No  Does the patient have a Medical Advance Directive? No  Does the patient have a Psychiatric Advance Directive? No  If the patient does not have a surrogate or Medical Advance Directive AND Psychiatric Advance Directive, the patient was offered information on these advance directives Yes and Patient will complete at a later time    Patient Instructions: Please continue all medications until otherwise directed by physician. Tobacco Cessation Discharge Plan:   Is the patient a smoker and needs referral for smoking cessation? Yes  Patient referred to the following for smoking cessation with an appointment? Yes     Patient was offered medication to assist with smoking cessation at discharge? Yes  Was education for smoking cessation added to the discharge instructions? Yes    Alcohol/Substance Abuse Discharge Plan:   Does the patient have a history of substance/alcohol abuse and requires a referral for treatment? Yes  Patient referred to the following for substance/alcohol abuse treatment with an appointment? Yes  Patient was offered medication to assist with alcohol cessation at discharge? Not applicable  Was education for substance/alcohol abuse added to discharge instructions? Yes    Patient discharged to Home; discussed with patient/caregiver and provided to the patient/caregiver either in hard copy or electronically.

## 2022-08-30 NOTE — PROGRESS NOTES
Problem: Falls - Risk of  Goal: *Absence of Falls  Description: Document Erich Fall Risk and appropriate interventions in the flowsheet.   Outcome: Progressing Towards Goal  Note: Fall Risk Interventions:            Medication Interventions: Teach patient to arise slowly                   Problem: Anxiety-Behavioral Health (Adult/Pediatric)  Goal: *STG: Participates in treatment plan  Outcome: Resolved/Met  Goal: *STG: Remains safe in hospital  Outcome: Progressing Towards Goal  Goal: *STG: Demonstrates decrease in ritualistic behavior  Outcome: Resolved/Met  Goal: *STG/LTG: Complies with medication therapy  Outcome: Resolved/Met     Problem: Patient Education: Go to Patient Education Activity  Goal: Patient/Family Education  Outcome: Progressing Towards Goal     Problem: Tobacco Use  Goal: *Knowledge of tobacco use cessation methods  Outcome: Resolved/Met     Problem: Depressed Mood (Adult/Pediatric)  Goal: *STG: Participates in treatment plan  Outcome: Resolved/Met  Goal: *STG: Verbalizes anger, guilt, and other feelings in a constructive manor  Outcome: Resolved/Met  Goal: *STG: Attends activities and groups  Outcome: Progressing Towards Goal

## 2022-08-30 NOTE — BH NOTES
Patient will be discharged to home today. Patient follows up with Dr. Bhavani Grullon. SW has called all of his offices and no one answers and there is not a voice mail. Patient states she has another number to call directly and she can get an appointment within one week. [facility] transition of care was routed to her PCP and she was given a copy to take with her. She was involved and agreeable in her discharge plan. Patient was given a list of therapist if she decides to follow up. Patient was involved and agreeable in her discharge plan.

## 2022-08-30 NOTE — DISCHARGE SUMMARY
[facility] DISCHARGE    Name:  Oni To  MR#:  [id number]  :  1975  ACCOUNT #:  [de-identified]  ADMIT DATE:  2022  DISCHARGE DATE:  2022      [facility] DISCHARGE SUMMARY    NOTE:  Greater than 35 minutes was spent in the preparation of this discharge. DISCHARGE DIAGNOSES:  AXIS I:  1. Bipolar disorder, depressed, severe (F31.4) - - rapid cycling. 2.  Possible cocaine use disorder, mild (F14.10). AXIS II:  Deferred. AXIS III:  History of hepatitis C; history of prior overdoses. AXIS IV:  Stressors are severe (death of boyfriend several months ago). AXIS V:  Global Assessment of Functioning at discharge is 70-75. DISCHARGE MEDICATIONS:  1. Loxapine 50 mg at bedtime (new) - - #30 pills with no refills. 2.  Trazodone 100 mg at bedtime p.r.n. for sleep (new) - - #30 pills with no refills. 3.  Carbamazepine 200 mg b.i.d.  4.  Celexa 40 mg daily. 5.  Clonazepam 0.5 mg b.i.d. p.r.n. (decreased). 6.  Nicoderm patch 21 mg daily - - #30 patches with no refills. 7.  Protonix 40 mg daily. NOTE:  The amitriptyline and Geodon were both discontinued. DISPOSITION/FOLLOW-UP PLANS:  The patient is being discharged later today in stable condition on 2022. She will continue to follow up with Dr. Ankit Simeon in [place], and she wanted to schedule the appointment herself and likely within the next 1-2 weeks. HOSPITAL COURSE:  As noted in the admission note, the patient is a 26-year-old single white female, who has a past psychiatric history of bipolar disorder, borderline personality disorder, possible PTSD, and \"impulse control disorder. \"  She was admitted voluntarily from the [facility] emergency room after presenting herself there with concerns about worsening depression and escalating suicidal thinking. She stated she has had suicidal thoughts fairly steadily since her boyfriend of six years or more  from cancer on 2022.   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 She had tried to take three prior overdoses from the time of his death through the month of June, but \"none of them worked. \"  She stated she thought she might be feeling little bit better, but then she began to have worsening symptoms of depression when his family made her move out of the house or trailer that she was living in (which she had owned). She has since moved into a trailer without any running water for a few days prior to admission, and she states she was driving her car and feeling so despondent that she was thinking about running into another car. That scared her because she did not want to hurt anybody and that is why she came to the emergency room. Once admitted, she did indicate that despite the ongoing and significant depressive symptoms that she has been experiencing (including a 30-pound weight loss), that she has had brief moments of hypomania, and that her mood has actually been cycling fairly frequently despite a predominance of depression. I therefore stopped the amitriptyline (she also was not sleeping with it) as it may be activating her and switched to trazodone which she seemed to tolerate adequately. We also stopped the low dose of Geodon and she had not seen much improvement with it and utilize loxapine at nighttime. Initially, she was very drowsy and slept a lot, but she states she was trying to catch up on her lack of sleep that she has had. By the time of her discharge, however, she was tolerating the medicines quite easily and was not overly sedated or lethargic at all. She was clearly quite safe and stable for discharge when she was released and denied any suicidal thoughts or any urges to harm herself. She was feeling much more hopeful and optimistic, was very pleased with the degree of response that she has had since being here.   She had no manic symptoms whatsoever, and the hope was that the loxapine would help to provide a little bit better smooth stabilization as well in combination with the other medicines noted above. She had no medical or physical issues either and she was felt to be medically stable for discharge when she was released. LABORATORY DATA:  On admission, the only laboratory tests of note was that her potassium was low at 3.0. A followup the next day was 3.4, and on 08/26/2022 it was 4.4 after receiving a couple doses of potassium. Her urine drug screen was positive for cocaine, but otherwise negative and alcohol level was zero. A KUB x-ray on the day of discharge showed no ileus or obstruction, but significant constipation and a distended bladder.       Magdiel Atkins MD      [id number]  D:  08/30/2022 11:15  T:  08/30/2022 12:58  JOB #:  [id number]  CC:  Kaelyn Cason MD

## 2022-09-19 ENCOUNTER — HOSPITAL ENCOUNTER (INPATIENT)
Age: 47
LOS: 7 days | Discharge: HOME | DRG: 753 | End: 2022-09-26
Attending: EMERGENCY MEDICINE | Admitting: PSYCHIATRY & NEUROLOGY
Payer: MEDICAID

## 2022-09-19 DIAGNOSIS — R45.851 SUICIDAL IDEATION: Primary | ICD-10-CM

## 2022-09-19 PROBLEM — F31.9 BIPOLAR 1 DISORDER (HCC): Status: ACTIVE | Noted: 2022-09-19

## 2022-09-19 LAB
ALBUMIN SERPL-MCNC: 3.6 G/DL (ref 3.5–5)
ALBUMIN/GLOB SERPL: 0.9 {RATIO} (ref 1.1–2.2)
ALP SERPL-CCNC: 89 U/L (ref 45–117)
ALT SERPL-CCNC: 18 U/L (ref 12–78)
AMPHET UR QL SCN: NEGATIVE
ANION GAP SERPL CALC-SCNC: 10 MMOL/L (ref 5–15)
APAP SERPL-MCNC: <2 UG/ML (ref 10–30)
APPEARANCE UR: CLEAR
AST SERPL-CCNC: 20 U/L (ref 15–37)
BARBITURATES UR QL SCN: NEGATIVE
BASOPHILS # BLD: 0.1 K/UL (ref 0–0.1)
BASOPHILS NFR BLD: 1 % (ref 0–1)
BENZODIAZ UR QL: NEGATIVE
BILIRUB SERPL-MCNC: 0.2 MG/DL (ref 0.2–1)
BILIRUB UR QL: NEGATIVE
BUN SERPL-MCNC: 4 MG/DL (ref 6–20)
BUN/CREAT SERPL: 6 (ref 12–20)
CALCIUM SERPL-MCNC: 9.2 MG/DL (ref 8.5–10.1)
CANNABINOIDS UR QL SCN: NEGATIVE
CHLORIDE SERPL-SCNC: 105 MMOL/L (ref 97–108)
CO2 SERPL-SCNC: 28 MMOL/L (ref 21–32)
COCAINE UR QL SCN: POSITIVE
COLOR UR: NORMAL
CREAT SERPL-MCNC: 0.71 MG/DL (ref 0.55–1.02)
DIFFERENTIAL METHOD BLD: NORMAL
DRUG SCRN COMMENT,DRGCM: ABNORMAL
EOSINOPHIL # BLD: 0.4 K/UL (ref 0–0.4)
EOSINOPHIL NFR BLD: 5 % (ref 0–7)
ERYTHROCYTE [DISTWIDTH] IN BLOOD BY AUTOMATED COUNT: 13 % (ref 11.5–14.5)
ETHANOL SERPL-MCNC: <10 MG/DL
FLUAV RNA SPEC QL NAA+PROBE: NOT DETECTED
FLUBV RNA SPEC QL NAA+PROBE: NOT DETECTED
GLOBULIN SER CALC-MCNC: 3.9 G/DL (ref 2–4)
GLUCOSE SERPL-MCNC: 82 MG/DL (ref 65–100)
GLUCOSE UR STRIP.AUTO-MCNC: NEGATIVE MG/DL
HCG UR QL: NEGATIVE
HCT VFR BLD AUTO: 45.1 % (ref 35–47)
HGB BLD-MCNC: 15.4 G/DL (ref 11.5–16)
HGB UR QL STRIP: NEGATIVE
IMM GRANULOCYTES # BLD AUTO: 0 K/UL (ref 0–0.04)
IMM GRANULOCYTES NFR BLD AUTO: 0 % (ref 0–0.5)
KETONES UR QL STRIP.AUTO: NEGATIVE MG/DL
LEUKOCYTE ESTERASE UR QL STRIP.AUTO: NEGATIVE
LYMPHOCYTES # BLD: 3.3 K/UL (ref 0.8–3.5)
LYMPHOCYTES NFR BLD: 48 % (ref 12–49)
MCH RBC QN AUTO: 31.8 PG (ref 26–34)
MCHC RBC AUTO-ENTMCNC: 34.1 G/DL (ref 30–36.5)
MCV RBC AUTO: 93.2 FL (ref 80–99)
METHADONE UR QL: NEGATIVE
MONOCYTES # BLD: 0.4 K/UL (ref 0–1)
MONOCYTES NFR BLD: 6 % (ref 5–13)
NEUTS SEG # BLD: 2.8 K/UL (ref 1.8–8)
NEUTS SEG NFR BLD: 40 % (ref 32–75)
NITRITE UR QL STRIP.AUTO: NEGATIVE
NRBC # BLD: 0 K/UL (ref 0–0.01)
NRBC BLD-RTO: 0 PER 100 WBC
OPIATES UR QL: NEGATIVE
PCP UR QL: NEGATIVE
PH UR STRIP: 6.5 [PH] (ref 5–8)
PLATELET # BLD AUTO: 283 K/UL (ref 150–400)
PMV BLD AUTO: 9.4 FL (ref 8.9–12.9)
POTASSIUM SERPL-SCNC: 3.3 MMOL/L (ref 3.5–5.1)
PROT SERPL-MCNC: 7.5 G/DL (ref 6.4–8.2)
PROT UR STRIP-MCNC: NEGATIVE MG/DL
RBC # BLD AUTO: 4.84 M/UL (ref 3.8–5.2)
SALICYLATES SERPL-MCNC: 5.2 MG/DL (ref 2.8–20)
SARS-COV-2, COV2: NOT DETECTED
SODIUM SERPL-SCNC: 143 MMOL/L (ref 136–145)
SP GR UR REFRACTOMETRY: 1.01 (ref 1–1.03)
UROBILINOGEN UR QL STRIP.AUTO: 1 EU/DL (ref 0.2–1)
WBC # BLD AUTO: 6.9 K/UL (ref 3.6–11)

## 2022-09-19 PROCEDURE — 81025 URINE PREGNANCY TEST: CPT

## 2022-09-19 PROCEDURE — 65220000003 HC RM SEMIPRIVATE PSYCH

## 2022-09-19 PROCEDURE — 74011250637 HC RX REV CODE- 250/637: Performed by: FAMILY MEDICINE

## 2022-09-19 PROCEDURE — 80143 DRUG ASSAY ACETAMINOPHEN: CPT

## 2022-09-19 PROCEDURE — 74011250637 HC RX REV CODE- 250/637: Performed by: PSYCHIATRY & NEUROLOGY

## 2022-09-19 PROCEDURE — 85025 COMPLETE CBC W/AUTO DIFF WBC: CPT

## 2022-09-19 PROCEDURE — 90791 PSYCH DIAGNOSTIC EVALUATION: CPT

## 2022-09-19 PROCEDURE — 36415 COLL VENOUS BLD VENIPUNCTURE: CPT

## 2022-09-19 PROCEDURE — 81003 URINALYSIS AUTO W/O SCOPE: CPT

## 2022-09-19 PROCEDURE — 80053 COMPREHEN METABOLIC PANEL: CPT

## 2022-09-19 PROCEDURE — 87636 SARSCOV2 & INF A&B AMP PRB: CPT

## 2022-09-19 PROCEDURE — 93005 ELECTROCARDIOGRAM TRACING: CPT

## 2022-09-19 PROCEDURE — 80307 DRUG TEST PRSMV CHEM ANLYZR: CPT

## 2022-09-19 PROCEDURE — 80179 DRUG ASSAY SALICYLATE: CPT

## 2022-09-19 PROCEDURE — 82077 ASSAY SPEC XCP UR&BREATH IA: CPT

## 2022-09-19 PROCEDURE — 99285 EMERGENCY DEPT VISIT HI MDM: CPT

## 2022-09-19 RX ORDER — IBUPROFEN 400 MG/1
400 TABLET ORAL
Status: DISCONTINUED | OUTPATIENT
Start: 2022-09-19 | End: 2022-09-25

## 2022-09-19 RX ORDER — MAG HYDROX/ALUMINUM HYD/SIMETH 200-200-20
30 SUSPENSION, ORAL (FINAL DOSE FORM) ORAL
Status: DISCONTINUED | OUTPATIENT
Start: 2022-09-19 | End: 2022-09-26 | Stop reason: HOSPADM

## 2022-09-19 RX ORDER — IBUPROFEN 200 MG
1 TABLET ORAL
Status: DISCONTINUED | OUTPATIENT
Start: 2022-09-19 | End: 2022-09-20

## 2022-09-19 RX ORDER — IBUPROFEN 200 MG
1 TABLET ORAL
Status: DISCONTINUED | OUTPATIENT
Start: 2022-09-19 | End: 2022-09-26 | Stop reason: HOSPADM

## 2022-09-19 RX ORDER — HYDROXYZINE PAMOATE 50 MG/1
50 CAPSULE ORAL
Status: DISCONTINUED | OUTPATIENT
Start: 2022-09-19 | End: 2022-09-26 | Stop reason: HOSPADM

## 2022-09-19 RX ORDER — ADHESIVE BANDAGE
30 BANDAGE TOPICAL DAILY PRN
Status: DISCONTINUED | OUTPATIENT
Start: 2022-09-19 | End: 2022-09-26 | Stop reason: HOSPADM

## 2022-09-19 RX ORDER — TRAZODONE HYDROCHLORIDE 50 MG/1
50 TABLET ORAL
Status: DISCONTINUED | OUTPATIENT
Start: 2022-09-19 | End: 2022-09-20

## 2022-09-19 RX ADMIN — TRAZODONE HYDROCHLORIDE 50 MG: 50 TABLET ORAL at 23:43

## 2022-09-19 RX ADMIN — HYDROXYZINE PAMOATE 50 MG: 50 CAPSULE ORAL at 23:43

## 2022-09-19 NOTE — ED TRIAGE NOTES
Not taking her medications since last admission. States she has thought about driving her car into something but does not want to hurt anyone else. Drove self to ER.  Not eating or sleeping well but is drinking fluids

## 2022-09-19 NOTE — ED PROVIDER NOTES
EMERGENCY DEPARTMENT HISTORY AND PHYSICAL EXAM          Date: 2022  Patient Name: Gabrielle Fox    History of Presenting Illness     Chief Complaint   Patient presents with    Suicidal       History Provided By: Patient    HPI: April L Jose Alejandro Ruiz is a 49 y.o. female, pmhx listed below, who presents to the ED c/o depression, suicidal ideation. Patient reports that since she was seen in our inpatient facility for depression/suicidal ideation, she has not taken any of her psychiatric medications. Reports she has been feeling suicidal and 2 days ago did tried to kill her self by taking [place] lot of cocaine\". Reports she had a friend who overdosed using similar method. Reports she is still feeling suicidal at this time. Reports she is not sleeping or eating well. Reports anhedonia. Has not spoken with her psychiatrist regarding the symptoms, came to the emergency department for an evaluation. PCP: Arie Marr MD    There are no other complaints, changes, or physical findings at this time.          Past History       Past Medical History:  Past Medical History:   Diagnosis Date    ADHD     Bipolar disease, chronic (HCC)     Borderline personality disorder ([facility] [address]     Hepatitis C     Liver disease     Hepatitis C: resolved       Past Surgical History:  Past Surgical History:   Procedure Laterality Date    HX GYN      C Section x 3    HX GYN      D & C    HX GYN       x 3       Family History:  Family History   Problem Relation Age of Onset    Heart Disease Mother     Diabetes Mother     Thyroid Disease Mother     Hypertension Mother     No Known Problems Father     Cancer Maternal Grandmother         kidney       Social History:  Social History     Tobacco Use    Smoking status: Every Day     Packs/day: 1.00     Types: Cigarettes    Smokeless tobacco: Never   Substance Use Topics    Alcohol use: Not Currently    Drug use: Never       Current Outpatient Medications   Medication Sig Dispense Refill loxapine (LOXITANE) 50 mg capsule Take 1 Capsule by mouth nightly. 30 Capsule 0    traZODone (DESYREL) 100 mg tablet Take 1 Tablet by mouth nightly as needed for Sleep. 30 Tablet 0    clonazePAM (KlonoPIN) 0.5 mg tablet Take 1 Tablet by mouth two (2) times daily as needed for Anxiety. Max Daily Amount: 1 mg.      nicotine (NICODERM CQ) 21 mg/24 hr 1 Patch by TransDERmal route every twenty-four (24) hours for 30 days. 30 Patch 0    carBAMazepine (TEGretol) 200 mg tablet 1 tablet Orally Twice a day      citalopram (CELEXA) 40 mg tablet Take 40 mg by mouth daily. pantoprazole (PROTONIX) 40 mg tablet TAKE 1 TABLET BY MOUTH ONCE DAILY      cholecalciferol (VITAMIN D3) (5000 Units/125 mcg) tab tablet Take  by mouth daily. Allergies: Allergies   Allergen Reactions    Oxycodone-Acetaminophen Hives     itching           Review of Systems   Review of Systems   Constitutional:  Negative for chills and fever. HENT:  Negative for congestion. Eyes:  Negative for pain. Respiratory:  Negative for shortness of breath. Cardiovascular:  Negative for chest pain. Gastrointestinal:  Negative for abdominal pain. Genitourinary:  Negative for flank pain. Musculoskeletal:  Negative for back pain. Neurological:  Negative for headaches. Psychiatric/Behavioral:  Positive for suicidal ideas. Negative for agitation. Physical Exam     Vital Signs-Reviewed the patient's vital signs. Patient Vitals for the past 12 hrs:   Temp Pulse Resp BP SpO2   09/19/22 1123 98.1 °F (36.7 °C) 95 16 (!) 137/96 98 %       Physical Exam  Constitutional:       Appearance: Normal appearance. Comments: Disheveled   HENT:      Head: Normocephalic and atraumatic. Mouth/Throat:      Mouth: Mucous membranes are moist.   Eyes:      Pupils: Pupils are equal, round, and reactive to light. Cardiovascular:      Rate and Rhythm: Normal rate and regular rhythm.    Pulmonary:      Effort: Pulmonary effort is normal.      Breath sounds: Normal breath sounds. Abdominal:      Tenderness: There is no abdominal tenderness. Musculoskeletal:         General: No swelling. Skin:     General: Skin is warm and dry. Neurological:      Mental Status: She is alert and oriented to person, place, and time. Psychiatric:         Attention and Perception: Attention normal.         Mood and Affect: Mood is depressed. Affect is labile. Speech: Speech normal.         Thought Content: Thought content includes suicidal ideation. Diagnostic Study Results     Labs -     Recent Results (from the past 12 hour(s))   COVID-19 WITH INFLUENZA A/B    Collection Time: 09/19/22 11:36 AM   Result Value Ref Range    SARS-CoV-2 by PCR Not detected NOTD      Influenza A by PCR Not detected NOTD      Influenza B by PCR Not detected NOTD     CBC WITH AUTOMATED DIFF    Collection Time: 09/19/22 12:05 PM   Result Value Ref Range    WBC 6.9 3.6 - 11.0 K/uL    RBC 4.84 3.80 - 5.20 M/uL    HGB 15.4 11.5 - 16.0 g/dL    HCT 45.1 35.0 - 47.0 %    MCV 93.2 80.0 - 99.0 FL    MCH 31.8 26.0 - 34.0 PG    MCHC 34.1 30.0 - 36.5 g/dL    RDW 13.0 11.5 - 14.5 %    PLATELET 262 263 - 952 K/uL    MPV 9.4 8.9 - 12.9 FL    NRBC 0.0 0  WBC    ABSOLUTE NRBC 0.00 0.00 - 0.01 K/uL    NEUTROPHILS 40 32 - 75 %    LYMPHOCYTES 48 12 - 49 %    MONOCYTES 6 5 - 13 %    EOSINOPHILS 5 0 - 7 %    BASOPHILS 1 0 - 1 %    IMMATURE GRANULOCYTES 0 0.0 - 0.5 %    ABS. NEUTROPHILS 2.8 1.8 - 8.0 K/UL    ABS. LYMPHOCYTES 3.3 0.8 - 3.5 K/UL    ABS. MONOCYTES 0.4 0.0 - 1.0 K/UL    ABS. EOSINOPHILS 0.4 0.0 - 0.4 K/UL    ABS. BASOPHILS 0.1 0.0 - 0.1 K/UL    ABS. IMM.  GRANS. 0.0 0.00 - 0.04 K/UL    DF AUTOMATED     METABOLIC PANEL, COMPREHENSIVE    Collection Time: 09/19/22 12:05 PM   Result Value Ref Range    Sodium 143 136 - 145 mmol/L    Potassium 3.3 (L) 3.5 - 5.1 mmol/L    Chloride 105 97 - 108 mmol/L    CO2 28 21 - 32 mmol/L    Anion gap 10 5 - 15 mmol/L    Glucose 82 65 - 100 mg/dL    BUN 4 (L) 6 - 20 MG/DL    Creatinine 0.71 0.55 - 1.02 MG/DL    BUN/Creatinine ratio 6 (L) 12 - 20      GFR est AA >60 >60 ml/min/1.73m2    GFR est non-AA >60 >60 ml/min/1.73m2    Calcium 9.2 8.5 - 10.1 MG/DL    Bilirubin, total 0.2 0.2 - 1.0 MG/DL    ALT (SGPT) 18 12 - 78 U/L    AST (SGOT) 20 15 - 37 U/L    Alk. phosphatase 89 45 - 117 U/L    Protein, total 7.5 6.4 - 8.2 g/dL    Albumin 3.6 3.5 - 5.0 g/dL    Globulin 3.9 2.0 - 4.0 g/dL    A-G Ratio 0.9 (L) 1.1 - 2.2     ETHYL ALCOHOL    Collection Time: 09/19/22 12:05 PM   Result Value Ref Range    ALCOHOL(ETHYL),SERUM <10 <10 MG/DL   ACETAMINOPHEN    Collection Time: 09/19/22 12:05 PM   Result Value Ref Range    Acetaminophen level <2 (L) 10 - 30 ug/mL   SALICYLATE    Collection Time: 09/19/22 12:05 PM   Result Value Ref Range    Salicylate level 5.2 2.8 - 20.0 MG/DL   EKG, 12 LEAD, INITIAL    Collection Time: 09/19/22  7:52 PM   Result Value Ref Range    Ventricular Rate 70 BPM    Atrial Rate 70 BPM    P-R Interval 118 ms    QRS Duration 86 ms    Q-T Interval 406 ms    QTC Calculation (Bezet) 438 ms    Calculated P Axis 49 degrees    Calculated R Axis 44 degrees    Calculated T Axis 45 degrees    Diagnosis       Sinus rhythm with premature atrial complexes  Otherwise normal ECG  No previous ECGs available         Radiologic Studies -   No orders to display     CT Results  (Last 48 hours)      None          CXR Results  (Last 48 hours)      None                  Medical Decision Making   I am the first provider for this patient. I reviewed the vital signs, available nursing notes, past medical history, past surgical history, family history and social history. Records Reviewed: Nursing Notes and Old Medical Records    Provider Notes (Medical Decision Making):   MDM: 79-year-old female with suicidal ideation, now in the emergency department. Medically cleared. Case discussed with BSMART who assessed the patient and believes she is requiring inpatient stabilization. Currently searching for placement. Case signed out to Dr. Marsha Michel pending placement. Diagnosis     Clinical Impression:   1. Suicidal ideation            Disposition:      Current Discharge Medication List            Please note, this dictation was completed with Applimation, the computer voice recognition software. Quite often unanticipated grammatical, syntax, homophones, and other interpretive errors are inadvertently transcribed by the computer software. Please disregard these errors. Please excuse any errors that have escaped final proof reading.

## 2022-09-19 NOTE — ED NOTES
Meal tray w/safety precautions set up at bedside for pt. Pt observed using phone to entertain herself at this time.

## 2022-09-19 NOTE — BSMART NOTE
Comprehensive Assessment Form Part 1      Section I - Disposition    MDD w/ SI     The Medical Doctor to Psychiatrist conference was not completed. The Medical Doctor is in agreement with BSMART. The plan is for patient to be a voluntary admission once medically cleared. The on-call Psychiatrist consulted was Dr. Gonzales London. The admitting Psychiatrist will be Dr. Hesham Blake. The admitting Diagnosis is noted above. The Payor source is  [place]/[place]. Past Medical History:   Diagnosis Date    ADHD     Bipolar disease, chronic (HCC)     Borderline personality disorder ([facility] [address]     Hepatitis C     Liver disease     Hepatitis C: resolved      This writer reviewed the [facility] Suicide Severity Rating Scale in nursing flowsheet and the risk level assigned is high risk. Based on this assessment, the risk of suicide is high risk and the plan is for patient to be a voluntary admission once medically cleared. Section II - Integrated Summary  Summary: Per triage: Not taking her medications since last admission. States she has thought about driving her car into something but does not want to hurt anyone else. Drove self to ER. Not eating or sleeping well but is drinking fluids. Patient was discharged from \[facility]\"" in August for SI with a plan. Documentation shows that the reports are similar. Patient reports that since discharge she has been unable to get an appointment with her \"shrink\" and feels this is contributing to her current depression and SI along with her recent charges for shop lifting. Patient is a 49 y.o white female who self presented to the ER with the above C/O. Patient was evaluated via tele in [address] [place] [address] Drive @ \[facility]\"". Patient was dressed in green gown and lying in bed. Patient endorses SI with a plan to \"run my care off of the road to kill myself\". Patient reports that she attempted to OD on cocaine last Friday \"but that didn't work. \" Patient reports several attempts to end her life by OD \"drugs just aren't doing it\". Patient reports that on today she contacted her mother, who encouraged her to come to the ER. Patient currently denies HI, AH/VH. Patient reports that her sleep has been inconsistent and describes it has she either sleep to much or is not sleeping enough. She notes that she does not have an appetite, but denies any recent weight loss. Patient denies any medical concerns. Patient reports that she has a recent shop lifting charge and has court on 9/28/22. The patient has demonstrated mental capacity to provide informed consent. The information is given by the patient and previous records. The Chief Complaint is noted above  The Precipitant Factors is noted above. Previous Hospitalizations: Yes, last admission 8/22 @ [facility]  The patient has not previously been in restraints. Current Psychiatrist is Dr. Sonia Cabrales. Lethality Assessment:    The potential for suicide noted by the following: intent, previous history of  attempts which occured over the past 3 months all in the form of overdose, defined plan, ideation, means, and current substance abuse . The potential for homicide is not noted. The patient has not been a perpetrator of sexual or physical abuse. There are not pending charges. The patient is felt to be at risk for self harm or harm to others. The attending nurse was advised the patient is at risk for self harm and the patient needs supervision. Section III - Psychosocial  The patient's overall mood and attitude is calm and cooperative. Feelings of helplessness and hopelessness are observed by verbal report. Generalized anxiety is not observed. Panic is not observed. Phobias are not observed. Obsessive compulsive tendencies are not observed. Section IV - Mental Status Exam  The patient's appearance shows no evidence of impairment. The patient's behavior shows no evidence of impairment. The patient is oriented to time, place, person and situation.   The patient's speech shows no evidence of impairment. The patient's mood is depressed. The range of affect is flat. The patient's thought content demonstrates no evidence of impairment. The thought process shows no evidence of impairment. The patient's perception shows no evidence of impairment. The patient's memory shows no evidence of impairment. The patient's appetite is decreased and she reports she has not been eating. The patient reports a inconsistent sleep pattern and reports that she either sleeps to much or not enough. The patient's insight shows no evidence of impairment. The patient's judgement shows no evidence of impairment. Section V - Substance Abuse  The patient is using substances. The patient is using cocaine by inhalation for unknown with last was 3-4 days ago. Patient reports that this was an attempt to end her life. Section VI - Living Arrangements  The patient is single. The patient lives alone and has adult children. The patient does plan to return home upon discharge. The patient does have legal issues pending. Patient reports that she has a shop lifting charge and has court on 9/28/22. The patient reports that her source of income is SSI benefits. Nondenominational and cultural practices have not been voiced at this time. The patient's greatest support comes from mother and this person may be involved with the treatment. The patient has not been in an event described as horrible or outside the realm of ordinary life experience either currently or in the past.  The patient has not been a victim of sexual/physical abuse. Section VII - Other Areas of Clinical Concern  The highest grade achieved is unknown with the overall quality of school experience being described as not assessed. The patient speaks [place] as a primary language. The patient has no communication impairments affecting communication.  The patient's preference for learning can be described as: can read and write adequately.   The patient's hearing is normal.  The patient's vision is normal.    Parker Nance, Resident in Counseling

## 2022-09-19 NOTE — BSMART NOTE
BSMART assessment completed, and suicide risk level noted to be high. Primary Nurse Leeann Ballesteros  and Physician Mini Benitez notified. Concerns not observed. Security/Off- has not been notified.      Gurwinder Mata, Resident in Counseling

## 2022-09-19 NOTE — ED NOTES
[facility], she will tell mike to call with any updates

## 2022-09-19 NOTE — ED NOTES
Pt noted to be standing in hallway yelling that she is leaving stating \"everyone is ignoring me, no one is doing anything for me i've been here since 11am and no one is doing anything for me! \" Attempted to calm patient down and advised her we are waiting on a bed assignment, pt calling someone on the phone and screaming she is leaving \"I should have never come here, im getting the hell up out of here!\" security paged

## 2022-09-20 PROBLEM — F14.10 COCAINE ABUSE, EPISODIC (HCC): Status: ACTIVE | Noted: 2022-09-20

## 2022-09-20 PROBLEM — F31.9 BIPOLAR 1 DISORDER (HCC): Status: RESOLVED | Noted: 2022-09-19 | Resolved: 2022-09-20

## 2022-09-20 LAB
ATRIAL RATE: 70 BPM
CALCULATED P AXIS, ECG09: 49 DEGREES
CALCULATED R AXIS, ECG10: 44 DEGREES
CALCULATED T AXIS, ECG11: 45 DEGREES
DIAGNOSIS, 93000: NORMAL
P-R INTERVAL, ECG05: 118 MS
Q-T INTERVAL, ECG07: 406 MS
QRS DURATION, ECG06: 86 MS
QTC CALCULATION (BEZET), ECG08: 438 MS
VENTRICULAR RATE, ECG03: 70 BPM

## 2022-09-20 PROCEDURE — 65220000003 HC RM SEMIPRIVATE PSYCH

## 2022-09-20 PROCEDURE — 90792 PSYCH DIAG EVAL W/MED SRVCS: CPT | Performed by: PSYCHIATRY & NEUROLOGY

## 2022-09-20 PROCEDURE — 74011250637 HC RX REV CODE- 250/637: Performed by: PSYCHIATRY & NEUROLOGY

## 2022-09-20 RX ORDER — LOXAPINE SUCCINATE 25 MG/1
50 TABLET ORAL
Status: DISCONTINUED | OUTPATIENT
Start: 2022-09-20 | End: 2022-09-26 | Stop reason: HOSPADM

## 2022-09-20 RX ORDER — MELATONIN
5000 DAILY
Status: DISCONTINUED | OUTPATIENT
Start: 2022-09-20 | End: 2022-09-26 | Stop reason: HOSPADM

## 2022-09-20 RX ORDER — CITALOPRAM 20 MG/1
40 TABLET, FILM COATED ORAL DAILY
Status: DISCONTINUED | OUTPATIENT
Start: 2022-09-20 | End: 2022-09-26 | Stop reason: HOSPADM

## 2022-09-20 RX ORDER — CARBAMAZEPINE 200 MG/1
200 TABLET ORAL 2 TIMES DAILY
Status: DISCONTINUED | OUTPATIENT
Start: 2022-09-20 | End: 2022-09-26 | Stop reason: HOSPADM

## 2022-09-20 RX ORDER — TRAZODONE HYDROCHLORIDE 100 MG/1
100 TABLET ORAL
Status: DISCONTINUED | OUTPATIENT
Start: 2022-09-20 | End: 2022-09-26 | Stop reason: HOSPADM

## 2022-09-20 RX ORDER — PANTOPRAZOLE SODIUM 40 MG/1
40 TABLET, DELAYED RELEASE ORAL
Status: DISCONTINUED | OUTPATIENT
Start: 2022-09-20 | End: 2022-09-26 | Stop reason: HOSPADM

## 2022-09-20 RX ADMIN — CITALOPRAM HYDROBROMIDE 40 MG: 20 TABLET ORAL at 10:09

## 2022-09-20 RX ADMIN — Medication 5000 UNITS: at 10:09

## 2022-09-20 RX ADMIN — PANTOPRAZOLE SODIUM 40 MG: 40 TABLET, DELAYED RELEASE ORAL at 10:09

## 2022-09-20 RX ADMIN — CARBAMAZEPINE 200 MG: 200 TABLET ORAL at 10:09

## 2022-09-20 RX ADMIN — LOXAPINE 50 MG: 25 CAPSULE ORAL at 21:24

## 2022-09-20 RX ADMIN — CARBAMAZEPINE 200 MG: 200 TABLET ORAL at 21:24

## 2022-09-20 NOTE — MASTER TREATMENT PLAN
[facility]  Master Treatment Plan for April Izabel Hill    Date Treatment Plan Initiated: 9/19/22    Treatment Plan Modalities:  Type of Modality Amount  (x minutes) Frequency (x/week) Duration (x days) Name of Responsible Staff   [address] [place] [address] meetings to encourage peer interactions 30 14 1 Neville Pena., [facility]   Group psychotherapy to assist in building coping skills and internal controls 60 5 1 Marylene Hover., [facility]  Vic Adame., [facility]   Therapeutic activity groups to build coping skills 61 7 1 Isaias Soto., [facility]     Psychoeducation in group setting to address:   Medication education  Coping Skills  Symptom Management   60 7 1 Marylene Hover., [facility]  Vic Adame., [facility]  Sukhi Braswell., RN  Saurav Phelps RN   Discharge planning   60 2 1 Frantz Koehler.,    Spirituality    60 2 1 Caio Quintero.   Physician medication management   15 7 1 ESTRADA Phelps MD                                         Treatment Team Signatures    I have participated in the development of this plan of treatment and agree to its implementation.     Patient Signature       Patient Printed Name Date/Time   Social Work/Therapist Signature       Social Work/Therapist Printed Name Date/Time   RN Signature       RN Printed Name Date/Time   Other Signature     Other Signature Date/Time   MD Signature       MD Printed Name Date/Time

## 2022-09-20 NOTE — ED NOTES
TRANSFER - OUT REPORT:    Verbal report given to Edwin Villareal on April L Yu Mark  being transferred to [facility] [id number] for routine progression of care       Report consisted of patients Situation, Background, Assessment and   Recommendations(SBAR). Information from the following report(s) SBAR, Kardex, ED Summary, Procedure Summary, Intake/Output, MAR, Accordion and Recent Results was reviewed with the receiving nurse. Lines:       Opportunity for questions and clarification was provided.       Patient transported with:   Sudheer Feldman

## 2022-09-20 NOTE — BSMART NOTE
Pt accepted to [facility] 232 -bed 1  accepted by Dr. Nicole De La Vega updated ED, Provider Dr. Nitish Moreno

## 2022-09-20 NOTE — H&P
[facility]  PSYCH HISTORY AND PHYSICAL    Name:  Bishop Nichols  MR#:  [id number]  :  1975  ACCOUNT #:  [de-identified]  ADMIT DATE:  2022    [facility]    NOTE:  Please see the previous medical records from a recent admission almost 3 weeks ago. HISTORY OF PRESENT ILLNESS:  The patient is a 79-year-old single white female who has a past psychiatric history of bipolar disorder, and a prior diagnosis of borderline personality disorder and possible PTSD, but also a likely diagnosis of polysubstance abuse, particularly cocaine. She had been hospitalized here from 2022 through 2022 with similar symptoms, essentially being increasingly depressed and distraught since her boyfriend of 6 years  from cancer on 2022. During that hospital stay, we had switched Geodon to loxapine, and started trazodone and continued the carbamazepine and Celexa that she had been on. We also decreased her dose of clonazepam, and at the time of discharge, her mood was significantly better, fairly full and bright. However, she did not follow up after that discharge, although she states she tried to call Dr. Serenity Gaviria, but could not get through to him. She did not fill the prescriptions and remained off medication. Not surprisingly, her mood had deteriorated since then to the point where she was again feeling quite hopeless and even having suicidal thoughts about wanting to run her car off of the road into a tree. She did not act on that, and she spoke to her mother who suggested she go back to the emergency room to get readmitted. She states that she continues to be quite dysphoric and distraught, and her neurovegetative functioning has been fairly impaired, as it was before.   Her urine did test positive for cocaine, and I suspect that that may be a big factor in her lack of compliance with treatment, as well as her significantly worsened dysphoria, given that she could be coming down off that substance. We did discuss treatment options with her and she agrees that getting back on the same medicine regimen would be a good start to things. She added that she has also been stressed by having turned herself in on a shoplifting charge and having to go to court next week (09/28/2022) likely for an arraignment hearing. She continues to live alone in a trailer without any running water, but she states that she is managing with that type of stress, but clearly the loss of her boyfriend and the fact that she has some upcoming court issues are both impacting her much more significantly. PAST PSYCHIATRIC HISTORY, PAST MEDICAL HISTORY, FAMILY HISTORY, AND SOCIAL HISTORY:  Please see the admission note from 08/23/2022 for this information. MEDICATION ON ADMISSION:  1. Loxapine 50 mg at bedtime. 2.  Trazodone 100 mg at bedtime p.r.n. for sleep. 3.  Tegretol 200 mg b.i.d.  4.  Celexa 40 mg daily. 5.  Protonix 40 mg daily. 6.  Clonazepam 0.5 mg b.i.d. p.r.n. ALLERGIES:  OXYCODONE. MENTAL STATUS EXAM:  The patient is noted to be a casually dressed but poorly groomed 69-year-old who appeared older than her stated age. She was lying in bed and was very drowsy but was able to remain awake enough to answer questions. She essentially endorsed increased depressive symptoms and dysphoria as noted above, and she essentially presented in the same way as she had the last time. Her neurovegetative functioning has been impaired, but she denies having any suicidal thoughts or intentions at the present moment. She has felt hopeless at times, however, and had suicidal thoughts prior to admission as noted above. There is no evidence of any jyothi or hypomania nor any symptoms of psychosis such as hallucinations or delusions. She was not as motorically restless as she was the first time.   Her judgment and insight are fairly poor and her cognitive functioning shows some difficulty with concentration and attention span. DIAGNOSTIC IMPRESSION:  AXIS I:  1. Bipolar disorder, depressed, severe (F31.4) - - rapid cycling. 2.  Cocaine use disorder (F14.20). AXIS II:  Deferred. AXIS III:  History of hepatitis C; prior overdoses. AXIS IV:  Stressors are moderate to severe (loss of boyfriend and lack of support). AXIS V:  Global Assessment of Functioning currently is 40-45. PLAN:  1. We will admit the patient for further supportive treatment, close observation, increased structure, and involvement within the groups and milieu. 2.  We will restart the medicines noted above. 3.  I will hold off on ordering the clonazepam and use Vistaril in its place to see if a stronger anxiolytic is really needed given her substance abuse history. 4.  Estimated length of stay would likely be on the order of 5-7 days depending upon her response to treatment. Followup would be with Dr. Loan Rahman. I certify that this patient's inpatient psychiatric hospital admission is medically necessary for treatment which could reasonably be expected to improve her condition or for diagnostic study. The inpatient psychiatric services are provided while she is under the care of a physician and are included in the individualized plan of care.         Akash Casarez MD      [id number]  D:  09/20/2022 11:46  T:  09/20/2022 14:11  JOB #:  [id number]

## 2022-09-20 NOTE — PROGRESS NOTES
Problem: Falls - Risk of  Goal: *Absence of Falls  Description: Document Bard  Fall Risk and appropriate interventions in the flowsheet.   Outcome: Progressing Towards Goal  Note: Fall Risk Interventions:            Medication Interventions: Teach patient to arise slowly          Problem: Patient Education: Go to Patient Education Activity  Goal: Patient/Family Education  Outcome: Progressing Towards Goal

## 2022-09-20 NOTE — ED NOTES
Spoke with [employer] from [facility] no new updates at this time

## 2022-09-20 NOTE — H&P
Hospitalist Admission Note    NAME: Jennifer Barnes   :  1975   MRN:  [id number]     Date/Time:  2022 7:52 PM    Patient PCP: Damien Hodges MD  ______________________________________________________________________  Given the patient's current clinical presentation, I have a high level of concern for decompensation if discharged from the emergency department. Complex decision making was performed, which includes reviewing the patient's available past medical records, laboratory results, and x-ray films. My assessment of this patient's clinical condition and my plan of care is as follows. Assessment / Plan:    59-year-old female with past medical history of bipolar disorder, substance abuse, anxiety admitted voluntarily by emergency department for suicidal ideation and depression treatment. #Bipolar disorder. With severe depression. #Substance abuse. Urine tox cocaine positive. Admit to inpatient psychiatric unit. Patient blood work is within normal limit. Urine tox is positive for cocaine. Blood alcohol level is negative acetaminophen level negligible. COVID screening is negative. Patient currently does not have any medical active complaint. Continue with psych medication per psychiatrist recommendation. Continue with behavior/counseling session/group therapy. Continue with Protonix for GERD and vitamin D supplement. Code Status: Full code. Surrogate Decision Maker:    DVT Prophylaxis: Ambulation. GI Prophylaxis: not indicated    Baseline: Independent for activities of daily living      Subjective:   CHIEF COMPLAINT: Suicidal ideation and worsening depression. Noncompliant with medication    HISTORY OF PRESENT ILLNESS:     Jennifer Barnes is a 49 y.o.  female who presents with suicidal ideation and not taking medication. Patient does not have any significant past medical history.   Patient presented to the emergency department with worsening depression. Patient was not taking her medication prescribed by the psychiatrist.  Patient reported to have suicidal ideation/depression. She felt suicidal and tried to overdose herself by taking cocaine she reported that one of her friend had  with similar method. Patient during the ER encounter still felt suicidal.  Patient reported to be eating well. Patient denied of any high-grade fever, nausea, vomiting, chest pain, shortness of breath, abdominal pain, dysuria or changes in bowel habit. Patient is being admitted to inpatient psych facility for depression/bipolar management. We were asked to admit for work up and evaluation of the above problems. Past Medical History:   Diagnosis Date    ADHD     Bipolar disease, chronic (HCC)     Borderline personality disorder ([facility] [address]     Hepatitis C     Liver disease     Hepatitis C: resolved        Past Surgical History:   Procedure Laterality Date    HX GYN      C Section x 3    HX GYN      D & C    HX GYN       x 3       Social History     Tobacco Use    Smoking status: Every Day     Packs/day: 1.00     Types: Cigarettes    Smokeless tobacco: Never   Substance Use Topics    Alcohol use: Not Currently        Family History   Problem Relation Age of Onset    Heart Disease Mother     Diabetes Mother     Thyroid Disease Mother     Hypertension Mother     No Known Problems Father     Cancer Maternal Grandmother         kidney   Unemployed. Lives alone. Allergies   Allergen Reactions    Oxycodone-Acetaminophen Hives     itching          Prior to Admission medications    Medication Sig Start Date End Date Taking? Authorizing Provider   loxapine (LOXITANE) 50 mg capsule Take 1 Capsule by mouth nightly. 22  Yes Isabel Shannon MD   citalopram (CELEXA) 40 mg tablet Take 40 mg by mouth daily.  19  Yes Provider, Historical   pantoprazole (PROTONIX) 40 mg tablet TAKE 1 TABLET BY MOUTH ONCE DAILY 20  Yes Provider, Historical   traZODone ([facility]) 100 mg tablet Take 1 Tablet by mouth nightly as needed for Sleep. 8/30/22   Cecilio Villasenor MD   clonazePAM (KlonoPIN) 0.5 mg tablet Take 1 Tablet by mouth two (2) times daily as needed for Anxiety. Max Daily Amount: 1 mg. 8/30/22   Cecilio Villasenor MD   nicotine (NICODERM CQ) 21 mg/24 hr 1 Patch by TransDERmal route every twenty-four (24) hours for 30 days. 8/30/22 9/29/22  Cecilio Villasenor MD   carBAMazepine (TEGretol) 200 mg tablet 1 tablet Orally Twice a day 7/2/19   Provider, Historical   cholecalciferol (VITAMIN D3) (5000 Units/125 mcg) tab tablet Take  by mouth daily. Provider, Historical       REVIEW OF SYSTEMS:     I am not able to complete the review of systems because:    The patient is intubated and sedated    The patient has altered mental status due to his acute medical problems    The patient has baseline aphasia from prior stroke(s)    The patient has baseline dementia and is not reliable historian    The patient is in acute medical distress and unable to provide information           Total of 12 systems reviewed as follows:       POSITIVE= underlined text  Negative = text not underlined  General:  fever, chills, sweats, generalized weakness, weight loss/gain,      loss of appetite   Eyes:    blurred vision, eye pain, loss of vision, double vision  ENT:    rhinorrhea, pharyngitis   Respiratory:   cough, sputum production, SOB, LERMA, wheezing, pleuritic pain   Cardiology:   chest pain, palpitations, orthopnea, PND, edema, syncope   Gastrointestinal:  abdominal pain , N/V, diarrhea, dysphagia, constipation, bleeding   Genitourinary:  frequency, urgency, dysuria, hematuria, incontinence   Muskuloskeletal :  arthralgia, myalgia, back pain  Hematology:  easy bruising, nose or gum bleeding, lymphadenopathy   Dermatological: rash, ulceration, pruritis, color change / jaundice  Endocrine:   hot flashes or polydipsia   Neurological:  headache, dizziness, confusion, focal weakness, paresthesia,     Speech difficulties, memory loss, gait difficulty  Psychological: Feelings of anxiety, depression, agitation    Objective:   VITALS:    Visit Vitals  /70 (BP 1 Location: Left upper arm, BP Patient Position: Sitting)   Pulse 88   Temp 98.2 °F (36.8 °C)   Resp 17   Ht 5' 6\" (1.676 m)   Wt 55.9 kg (123 lb 4.8 oz)   SpO2 96%   BMI 19.90 kg/m²       PHYSICAL EXAM:    General:    Alert, cooperative, no distress, appears stated age. HEENT: Atraumatic, anicteric sclerae, pink conjunctivae     No oral ulcers, mucosa moist, throat clear, dentition fair  Neck:  Supple, symmetrical,  thyroid: non tender  Lungs:   Clear to auscultation bilaterally. No Wheezing or Rhonchi. No rales. Chest wall:  No tenderness  No Accessory muscle use. Heart:   Regular  rhythm,  No  murmur   No edema  Abdomen:   Soft, non-tender. Not distended. Bowel sounds normal  Extremities: No cyanosis. No clubbing,      Skin turgor normal, Capillary refill normal, Radial dial pulse 2+  Skin:     Not pale. Not Jaundiced  No rashes   Psych:  Good insight. depressed. anxious or agitated. Neurologic: EOMs intact. No facial asymmetry. No aphasia or slurred speech. Symmetrical strength, Sensation grossly intact.  Alert and oriented X 4.     _______________________________________________________________________  Care Plan discussed with:    Comments   Patient x    Family      RN x    Care Manager                    Consultant:  x    _______________________________________________________________________  Expected  Disposition:   Home with Family    HH/PT/OT/RN    SNF/LTC    CHADD    ________________________________________________________________________  TOTAL TIME:  28 Minutes    Critical Care Provided     Minutes non procedure based      Comments     Reviewed previous records   >50% of visit spent in counseling and coordination of care  Discussion with patient and/or family and questions answered ________________________________________________________________________  Signed: Matt Jaimes MD    Procedures: see electronic medical records for all procedures/Xrays and details which were not copied into this note but were reviewed prior to creation of Plan.     LAB DATA REVIEWED:    Recent Results (from the past 24 hour(s))   URINALYSIS W/ RFLX MICROSCOPIC    Collection Time: 09/19/22  9:28 PM   Result Value Ref Range    Color YELLOW/STRAW      Appearance CLEAR CLEAR      Specific gravity 1.010 1.003 - 1.030      pH (UA) 6.5 5.0 - 8.0      Protein Negative NEG mg/dL    Glucose Negative NEG mg/dL    Ketone Negative NEG mg/dL    Bilirubin Negative NEG      Blood Negative NEG      Urobilinogen 1.0 0.2 - 1.0 EU/dL    Nitrites Negative NEG      Leukocyte Esterase Negative NEG     HCG URINE, QL    Collection Time: 09/19/22  9:28 PM   Result Value Ref Range    HCG urine, QL Negative NEG     DRUG SCREEN, URINE    Collection Time: 09/19/22  9:28 PM   Result Value Ref Range    AMPHETAMINES Negative NEG      BARBITURATES Negative NEG      BENZODIAZEPINES Negative NEG      COCAINE Positive (A) NEG      METHADONE Negative NEG      OPIATES Negative NEG      PCP(PHENCYCLIDINE) Negative NEG      THC (TH-CANNABINOL) Negative NEG      Drug screen comment (NOTE)

## 2022-09-20 NOTE — GROUP NOTE
[facility]  GROUP DOCUMENTATION INDIVIDUAL                                                                          Group Therapy Note    Date: 9/20/2022    April did not come to group this afternoon. She will continue to be encouraged to attend available groups.     Pawan Toledo

## 2022-09-20 NOTE — BH NOTES
Shift change report given to [facility] [place] re: SBAR, medications, and behavior.   Gustavo fall risk score:  1

## 2022-09-20 NOTE — ED NOTES
Pt aware that the only delay in admission to [facility] is the urine specimen and verbalized understanding.

## 2022-09-20 NOTE — BH NOTES
TRANSFER - IN REPORT:    Verbal report received from Nhi Novoa on April L Frederick Garcia  being received from [facility] ED for routine progression of care      Report consisted of patients Situation, Background, Assessment and   Recommendations(SBAR). Information from the following report(s) SBAR was reviewed with the receiving nurse. Opportunity for questions and clarification was provided.

## 2022-09-20 NOTE — ROUTINE PROCESS
Shift change report given to Jayne Hubbard, re: MACAR. Medications, and behavior.  Gustavo Fall Risk Score (1)

## 2022-09-20 NOTE — PROGRESS NOTES
Patient did not attend nor participate in community meeting with her peers this morning.    09/20/22 59 Megan Patel meeting   Attendance Did not attend   Number of participants 6   Time in 1030   Time out 1100   Total Time 30   MTP problem Depression

## 2022-09-20 NOTE — BH NOTES
Admission Note:     Patient arrived on the unit @2320 * from * on Voluntary admission. Patient was escorted on the unit  by [facility] and  [facility] personnel. Dr. Carlee Phelps and Nursing Supervisor were notified of patient's arrival. Consult placed for Medical H&P done by [facility]. Patient Alert and oriented x 4. Calm and cooperative. Denies SI/HI/AVH. No delusional statements made. Patient denied having a license with [employer]. Patient smokes a pack of cigarettes daily. Patient was counseled on smoking cessation to include recognizing dangerous situations, coping skills, and information on quitting. Patient verbalized understanding/ Nicotine patch ordered and patient requested to take in morning.

## 2022-09-20 NOTE — PROGRESS NOTES
Problem: Falls - Risk of  Goal: *Absence of Falls  Description: Document Leafy Hicks Fall Risk and appropriate interventions in the flowsheet.   Outcome: Progressing Towards Goal  Note: Fall Risk Interventions:            Medication Interventions: Teach patient to arise slowly                   Problem: Tobacco Use  Goal: *Tobacco use abstinence  Outcome: Progressing Towards Goal     Problem: Pain  Goal: *Control of Pain  Outcome: Progressing Towards Goal

## 2022-09-20 NOTE — ED NOTES
Pt has been politely asked to provide urine specimen, states she cannot at this time. Before assuming care of this pt, pt did urinate more than once with assisting RN in the ED and no specimen obtained per pt. Pt states she will try after having some water and given time.

## 2022-09-20 NOTE — BH NOTES
Shift change report given to Marcia Hines RN re: SBAR, medications, and behavior.   Gustavo fall risk score 1

## 2022-09-20 NOTE — BH NOTES
Affect constricted, mood depressed/anxious. Alert and oriented x 4. Denies pain. No SI/HI/AVH. No delusional or paranoid statements made. Patient in bed most of the shift only got up for meals. Patient declined groups. Medication compliant and no PRN's given. Appetite good. Ate 75%-100% of all meals. Patient in no apparent distress. Vitals signs within normal limits. Medical H&P done by Hospitalist Dr. Ivonne Basilio.

## 2022-09-21 PROBLEM — F14.20 COCAINE DEPENDENCE, EPISODIC (HCC): Status: ACTIVE | Noted: 2022-09-20

## 2022-09-21 PROCEDURE — 65220000003 HC RM SEMIPRIVATE PSYCH

## 2022-09-21 PROCEDURE — 74011250637 HC RX REV CODE- 250/637: Performed by: PSYCHIATRY & NEUROLOGY

## 2022-09-21 PROCEDURE — 99231 SBSQ HOSP IP/OBS SF/LOW 25: CPT | Performed by: PSYCHIATRY & NEUROLOGY

## 2022-09-21 RX ADMIN — HYDROXYZINE PAMOATE 50 MG: 50 CAPSULE ORAL at 19:24

## 2022-09-21 RX ADMIN — Medication 5000 UNITS: at 08:45

## 2022-09-21 RX ADMIN — CITALOPRAM HYDROBROMIDE 40 MG: 20 TABLET ORAL at 08:44

## 2022-09-21 RX ADMIN — CARBAMAZEPINE 200 MG: 200 TABLET ORAL at 21:06

## 2022-09-21 RX ADMIN — LOXAPINE 50 MG: 25 CAPSULE ORAL at 21:07

## 2022-09-21 RX ADMIN — CARBAMAZEPINE 200 MG: 200 TABLET ORAL at 08:44

## 2022-09-21 RX ADMIN — PANTOPRAZOLE SODIUM 40 MG: 40 TABLET, DELAYED RELEASE ORAL at 06:36

## 2022-09-21 NOTE — PROGRESS NOTES
Patient did not come out for group or snack , she was sleeping    09/20/22 2641   2000 [facility] meeting   Attendance Did not attend   Number of participants 5   Time in 2000   Time out 2030   Total Time 30

## 2022-09-21 NOTE — PROGRESS NOTES
Problem: Falls - Risk of  Goal: *Absence of Falls  Description: Document Dix Flow Fall Risk and appropriate interventions in the flowsheet.   Outcome: Progressing Towards Goal  Note: Fall Risk Interventions:            Medication Interventions: Teach patient to arise slowly                   Problem: Tobacco Use  Goal: *Tobacco use abstinence  Outcome: Progressing Towards Goal     Problem: Depressed Mood (Adult/Pediatric)  Goal: *STG: Participates in treatment plan  Outcome: Not Progressing Towards Goal  Goal: *STG: Attends activities and groups  Outcome: Not Progressing Towards Goal  Goal: *STG: Remains safe in hospital  Outcome: Progressing Towards Goal  Goal: *STG: Complies with medication therapy  Outcome: Progressing Towards Goal

## 2022-09-21 NOTE — ROUTINE PROCESS
Shift change report given to [facility], re: SBAR. Medications, and behavior.   Gustavo Fall Risk Score (1)

## 2022-09-21 NOTE — PROGRESS NOTES
09/21/22 [facility] 80 meeting   Attendance Did not attend   Number of participants 4   Time in 1030   Time out 1125   Total Time 55 April was not out for the Group meeting today. She was asked but refused.     Shane Meng CNA

## 2022-09-21 NOTE — BH NOTES
Affect blunted, mood depressed. Pt did not attend or participate in scheduled group activities. Pt remained isolative and withdrawn to her room during the evening. Pt refused snack. Pt vital signs stable with no complaints of pain. Pt denies SI/HI/AVH. Pt was compliant with medications and did not request a PRN. Pt is in no apparent distress at this time and made no delusional statements. Pt rested in her bed with her eyes closed for 1.25 hours.

## 2022-09-21 NOTE — BH NOTES
Affect flat/constricted, mood depressed/anxious. Alert and oriented x 4. Denies pain. No SI/HI/AVH. No delusional or paranoid statements made. Patient cooperative and pleasant. Patient quiet and isolative. Medication compliant. Appetite good eats 100% of all meals plus snacks. Patient in no apparent distress. Vitals signs within normal limits. PRN Medication Documentation    Specific patient behavior that led to need for PRN medication: Patient requested PRN Nicotine to help  smoking cessations. Staff interventions attempted prior to PRN being given: Assessment done  PRN medication given: Nicotine patch 21 mg applied topically at 0846. Patient response/effectiveness of PRN medication: Positive effects.

## 2022-09-21 NOTE — BH NOTES
Shift change report given to [facility] [place] re: SBAR, medications, and behavior.   Gustavo fall risk score:  1

## 2022-09-21 NOTE — BH NOTES
SW met with patient she reported that she did not get any medications filled and has not followed up with Dr. Author Murphy. Contacted the office and requested an appointment so patient will have this scheduled for when she may be discharged. They will be able to see patient October 7. Patient is worried about her upcoming court case for shoplifting, SW and patient discussed possibilities. Patient continues to stay in bed.

## 2022-09-21 NOTE — GROUP NOTE
[facility]  GROUP DOCUMENTATION INDIVIDUAL                                                                          Group Therapy Note      Date: 9/21/2022    Pt did not attend group today.   I will encourage her to attend in the future

## 2022-09-21 NOTE — PROGRESS NOTES
INTERVAL Hx:  Records and clinical information were reviewed. States she's feeling \"OK\", but still depressed. Has been staying in bed and isolating a lot, similar to her last stay. Still flat and anergic, but not agitated or labile. Tolerating getting back on her meds although they may be contributing to the tiredness. Hasn't asked about not taking the Clonazepam and hasn't taken any Vistaril yet, either. Slept only 1.25 hours, but she's sleeping now and much of the day, yesterday. MEDS:  Current Facility-Administered Medications   Medication Dose Route Frequency    carBAMazepine (TEGretol) tablet 200 mg  200 mg Oral BID    cholecalciferol (VITAMIN D3) (1000 Units /25 mcg) tablet 5,000 Units  5,000 Units Oral DAILY    citalopram (CELEXA) tablet 40 mg  40 mg Oral DAILY    loxapine (LOXITANE) capsule 50 mg  50 mg Oral QHS    pantoprazole (PROTONIX) tablet 40 mg  40 mg Oral ACB    traZODone (DESYREL) tablet 100 mg  100 mg Oral QHS PRN    ibuprofen (MOTRIN) tablet 400 mg  400 mg Oral Q8H PRN    magnesium hydroxide (MILK OF MAGNESIA) 400 mg/5 mL oral suspension 30 mL  30 mL Oral DAILY PRN    nicotine (NICODERM CQ) 21 mg/24 hr patch 1 Patch  1 Patch TransDERmal DAILY PRN    hydrOXYzine pamoate (VISTARIL) capsule 50 mg  50 mg Oral TID PRN    alum-mag hydroxide-simeth (MYLANTA) oral suspension 30 mL  30 mL Oral Q4H PRN       VITALS: Patient Vitals for the past 12 hrs:   Temp Pulse Resp BP SpO2   09/21/22 0806 97.9 °F (36.6 °C) 71 17 103/66 97 %       LABS: .No results found for this or any previous visit (from the past 24 hour(s)). MSE:   Appearance: casually dressed; fair grooming  Behavior: calm and cooperative; no agitation  Motor: normal  Mood/Affect: very dysphoric and flat  Thought Process: coherent; organized  Thought Content: no delusions; no SI/HI  Perceptions: no hallucinations  Insight/Judgment: fair    ASSESSMENT:   1. Bipolar disorder, depressed, severe (F31.4)  2.   Cocaine Use disorder (F14.20)    PLAN:  1. Continue restarting Loxapine, CBZ, and Celexa. 2.  Continue PRN Trazodone and Vistaril. 3.  ELOS: 4-5 days--F/U with Dr. Kamlesh Timmons.

## 2022-09-21 NOTE — BH NOTES
Behavioral Health Interdisciplinary Rounds     Patient Name: Jennifer Pickering  Age: 49 y.o. Room/Bed:  232/01  Primary Diagnosis: Severe depressed bipolar I disorder without psychotic features [facility]   Admission Status: Voluntary     Readmission within 30 days: yes  Power of  in place: no  Patient requires a blocked bed: no          Reason for blocked bed:     VTE Prophylaxis: Not indicated    Mobility needs/Fall risk: yes  Flu Vaccine : no   Nutritional Plan: no  Consults: no         Labs/Testing due today?: no    Sleep hours: 1.25       Participation in Care/Groups:  no  Medication Compliant?: Yes  PRNS (last 24 hours): None    Restraints (last 24 hours):  no     CIWA (range last 24 hours):     COWS (range last 24 hours):      Alcohol screening (AUDIT) completed -   AUDIT Score: 2     If applicable, date SBIRT discussed in treatment team AND documented:   AUDIT Screen Score: AUDIT Score: 2      Document Brief Intervention (corresponds directly with the 5 A's, Ask, Advise, Assess, Assist, and Arrange): At- Risk Patients (Score 7-15 for women; 8-15 for men)  Discuss concern patient is drinking at unhealthy levels known to increase risk of alcohol-related health problems. Is Patient ready to commit to change? No feels this last incident was a way to harm self    If No:  Encourage reflection  Discuss short term and long term health risks of consuming alcohol  Barriers to change  Reaffirm willingness to help / Educational materials provided  If Yes:  Set goal  Plan  Educational materials provided    Harmful use or Dependence (Score 16 or greater)  Discuss short term and long term health risks of consuming alcohol  Recommendations  Negotiate drinking goal  Recommend addiction specialist/center  Arrange follow-up appointments.     Tobacco - patient is a smoker: Have You Used Tobacco in the Past 30 Days: Yes  Illegal Drugs use: Have You Used Any Illegal Substances Over the Past 12 Months: Yes    24 hour chart check complete: yes     Patient goal(s) for today: did not set goal for self  Treatment team focus/goals: Absence of Falls  Progress note continues with depressed mood, feels very tired, stays in bed all day not participating in groups has restarted all of her medications    LOS:  2  Expected LOS: 4-5    Financial concerns/prescription coverage:  no  Family contact: no      Family requesting physician contact today:  no  Discharge plan: home  Access to weapons : no        Outpatient provider(s): yes  Patient's preferred phone number for follow up call : [phone]     Participating treatment team members: Joseph Evans, Dr. Jayla JOSEPH, Hina Melvin

## 2022-09-21 NOTE — PROGRESS NOTES
Problem: Depressed Mood (Adult/Pediatric)  Goal: *STG: Remains safe in hospital  Outcome: Progressing Towards Goal  Goal: *STG: Complies with medication therapy  Outcome: Progressing Towards Goal     Problem: Depressed Mood (Adult/Pediatric)  Goal: *LTG: Returns to previous level of functioning and participates with after care plan  Outcome: Not Met     Problem: Patient Education: Go to Patient Education Activity  Goal: Patient/Family Education  Outcome: Not Met

## 2022-09-22 PROCEDURE — 74011250637 HC RX REV CODE- 250/637: Performed by: PSYCHIATRY & NEUROLOGY

## 2022-09-22 PROCEDURE — 99231 SBSQ HOSP IP/OBS SF/LOW 25: CPT | Performed by: PSYCHIATRY & NEUROLOGY

## 2022-09-22 PROCEDURE — 65220000003 HC RM SEMIPRIVATE PSYCH

## 2022-09-22 RX ORDER — BISACODYL 5 MG
5 TABLET, DELAYED RELEASE (ENTERIC COATED) ORAL DAILY
Status: DISCONTINUED | OUTPATIENT
Start: 2022-09-22 | End: 2022-09-26 | Stop reason: HOSPADM

## 2022-09-22 RX ADMIN — CITALOPRAM HYDROBROMIDE 40 MG: 20 TABLET ORAL at 08:42

## 2022-09-22 RX ADMIN — IBUPROFEN 400 MG: 400 TABLET, FILM COATED ORAL at 15:18

## 2022-09-22 RX ADMIN — TRAZODONE HYDROCHLORIDE 100 MG: 100 TABLET ORAL at 20:32

## 2022-09-22 RX ADMIN — CARBAMAZEPINE 200 MG: 200 TABLET ORAL at 20:29

## 2022-09-22 RX ADMIN — BISACODYL 5 MG: 5 TABLET, COATED ORAL at 09:44

## 2022-09-22 RX ADMIN — Medication 5000 UNITS: at 08:42

## 2022-09-22 RX ADMIN — PANTOPRAZOLE SODIUM 40 MG: 40 TABLET, DELAYED RELEASE ORAL at 06:36

## 2022-09-22 RX ADMIN — CARBAMAZEPINE 200 MG: 200 TABLET ORAL at 08:42

## 2022-09-22 RX ADMIN — LOXAPINE 50 MG: 25 CAPSULE ORAL at 20:29

## 2022-09-22 RX ADMIN — HYDROXYZINE PAMOATE 50 MG: 50 CAPSULE ORAL at 15:18

## 2022-09-22 NOTE — PROGRESS NOTES
Problem: Depressed Mood (Adult/Pediatric)  Goal: *STG: Attends activities and groups  Outcome: Not Progressing Towards Goal   Pt did not attend groups today. Isolative to room and appears asleep.

## 2022-09-22 NOTE — PROGRESS NOTES
INTERVAL Hx:  Records and clinical information were reviewed. States she's feeling a little better, but still at least moderately depressed. Still having some vague thoughts of wanting to die, but she realizes they're softening and that she's beginning to feel a little stronger. Anxious about going to court on 9/28, but wants to be there and not miss it. Still staying in bed and isolating a lot, but slightly less so. Also constipated again--will order standing dose of Dulcolax daily, but last time nothing worked. Tolerating getting back on the meds although they may be contributing to the tiredness. Slept only 9.5 hours last night. MEDS:  Current Facility-Administered Medications   Medication Dose Route Frequency    bisacodyL (DULCOLAX) tablet 5 mg  5 mg Oral DAILY    carBAMazepine (TEGretol) tablet 200 mg  200 mg Oral BID    cholecalciferol (VITAMIN D3) (1000 Units /25 mcg) tablet 5,000 Units  5,000 Units Oral DAILY    citalopram (CELEXA) tablet 40 mg  40 mg Oral DAILY    loxapine (LOXITANE) capsule 50 mg  50 mg Oral QHS    pantoprazole (PROTONIX) tablet 40 mg  40 mg Oral ACB    traZODone (DESYREL) tablet 100 mg  100 mg Oral QHS PRN    ibuprofen (MOTRIN) tablet 400 mg  400 mg Oral Q8H PRN    magnesium hydroxide (MILK OF MAGNESIA) 400 mg/5 mL oral suspension 30 mL  30 mL Oral DAILY PRN    nicotine (NICODERM CQ) 21 mg/24 hr patch 1 Patch  1 Patch TransDERmal DAILY PRN    hydrOXYzine pamoate (VISTARIL) capsule 50 mg  50 mg Oral TID PRN    alum-mag hydroxide-simeth (MYLANTA) oral suspension 30 mL  30 mL Oral Q4H PRN       VITALS: Patient Vitals for the past 12 hrs:   Temp Pulse Resp BP SpO2   09/22/22 0725 97.4 °F (36.3 °C) 75 16 135/82 97 %         LABS: .No results found for this or any previous visit (from the past 24 hour(s)).     MSE:   Appearance: casually dressed; fair grooming  Behavior: calm and cooperative; no agitation  Motor: normal  Mood/Affect: slightly less dysphoric; flat  Thought Process: coherent; organized  Thought Content: no delusions; some PDW thoughts  Perceptions: no hallucinations  Insight/Judgment: fair    ASSESSMENT:   1. Bipolar disorder, depressed, severe (F31.4)  2. Cocaine Use disorder (F14.20)    PLAN:  1. Continue getting back on the Loxapine, CBZ, and Celexa. 2.  Continue PRN Trazodone and Vistaril. 3.  Dulcolax 5 mg daily. 4.  ELOS: 3-4 days--F/U with Dr. Rodriguez Mail set on 10/7/22.

## 2022-09-22 NOTE — GROUP NOTE
IP  GROUP DOCUMENTATION INDIVIDUAL        Pt was sleeping and did not attend group.   I will encourage pt to attend in the future

## 2022-09-22 NOTE — BH NOTES
Affect blunted, mood anxious. Pt did not attend or participate in scheduled group activities, but did come up afterward to eat snack. Pt vital signs stable with no complaints of pain. Pt denies SI/HI/AVH. Pt remains very isolative and withdrawn. Pt was compliant with medications and requested a PRN. Pt is in no apparent distress at this time and made no delusional statements. Pt rested in her bed with her eyes closed for 9.5 hours.     PRN Medication Documentation    Specific patient behavior that led to need for PRN medication: anxious  Staff interventions attempted prior to PRN being given: Pt request  PRN medication given: Vistaril 50 mg PO @ 1924  Patient response/effectiveness of PRN medication: effective

## 2022-09-22 NOTE — BH NOTES
Affect constricted, mood depressed/anxious. Alert and oriented X 4. Calm and cooperative. She did not attend group. Interacted with staff when prompted. Makes needs known. Started Bisacodyl initiated today for constipation. Ate all meals and snacks. Denied SI/HI/AVH and pain. Medication compliant. Stable with no s/s of distress. PRN Medication Documentation    Specific patient behavior that led to need for PRN medication: c/o back pain. Staff interventions attempted prior to PRN being given: Assessed pain. PRN medication given: Ibuprofen 400 mg/pain. Patient response/effectiveness of PRN medication: Endorses that medication helps reduce pain. Resting. PRN Medication Documentation    Specific patient behavior that led to need for PRN medication: came to nurses station anxious and asking for anxiety medication. Staff interventions attempted prior to PRN being given: Talked with patient. PRN medication given: Hydroxyzine 50 mg. pO anxiety. Patient response/effectiveness of PRN medication: Stated anxiety a little better.   Rested this afternoon after prn meds given

## 2022-09-22 NOTE — PROGRESS NOTES
09/22/22 110 [facility] meeting   Attendance Attended   Number of participants 5   Time in 1030   Time out 1133   Total Time 63   MTP problem Depression; Other (comment)  (Bipolar disorder)   Interventions/techniques Supported   Follows directions Followed directions   Interactions Interacted appropriately   Mental Status Flat;Preoccupied   Behavior/appearance Withdrawn/quiet; Negative   Suicide Risk Factors April has no thoughts of hurting herself nor anyone else   Suicide Protective Factors Denies intent   Goals Achieved Able to engage in interactions; Able to listen to others; Able to give feedback to another     April was out for the Group meeting this morning. She stated that she is having a good day, ate good, and slept good last night. She says she is having no anxiety nor depression. Nothing good nor bad has happened so far today. She has no thoughts of hurting herself nor anyone else and is in no pain.     Angel Butts CNA

## 2022-09-22 NOTE — PROGRESS NOTES
Patient did not come out for group or snack , she was sleeping    09/21/22 0137   2000 [facility] meeting   Attendance Did not attend   Number of participants 4   Time in 2000   Time out 2030   Total Time 30

## 2022-09-22 NOTE — ROUTINE PROCESS
Shift change report given to Julius Duncan RN; re:SBAR, medications, and behavior. Gustavo Fall Risk Score 1.

## 2022-09-23 PROCEDURE — 74011250637 HC RX REV CODE- 250/637: Performed by: PSYCHIATRY & NEUROLOGY

## 2022-09-23 PROCEDURE — 65220000003 HC RM SEMIPRIVATE PSYCH

## 2022-09-23 PROCEDURE — 99231 SBSQ HOSP IP/OBS SF/LOW 25: CPT | Performed by: PSYCHIATRY & NEUROLOGY

## 2022-09-23 RX ADMIN — PANTOPRAZOLE SODIUM 40 MG: 40 TABLET, DELAYED RELEASE ORAL at 06:52

## 2022-09-23 RX ADMIN — BISACODYL 5 MG: 5 TABLET, COATED ORAL at 08:30

## 2022-09-23 RX ADMIN — CARBAMAZEPINE 200 MG: 200 TABLET ORAL at 08:30

## 2022-09-23 RX ADMIN — CITALOPRAM HYDROBROMIDE 40 MG: 20 TABLET ORAL at 08:30

## 2022-09-23 RX ADMIN — LOXAPINE 50 MG: 25 CAPSULE ORAL at 21:15

## 2022-09-23 RX ADMIN — HYDROXYZINE PAMOATE 50 MG: 50 CAPSULE ORAL at 16:55

## 2022-09-23 RX ADMIN — Medication 5000 UNITS: at 08:30

## 2022-09-23 RX ADMIN — CARBAMAZEPINE 200 MG: 200 TABLET ORAL at 21:15

## 2022-09-23 RX ADMIN — TRAZODONE HYDROCHLORIDE 100 MG: 100 TABLET ORAL at 21:15

## 2022-09-23 RX ADMIN — IBUPROFEN 400 MG: 400 TABLET, FILM COATED ORAL at 21:15

## 2022-09-23 NOTE — PROGRESS NOTES
INTERVAL Hx:  Records and clinical information were reviewed. States she's feeling a little better, but remains very withdrawn and spends most of her time in bed. Still feels moderately dysphoric, but denies having any SI presently. Still anxious about going to court on 9/28, but slightly less so now, and she wants to make sure she doesn't miss it. Still constipated but doesn't want to use an enema yet. Tolerating getting back on the meds although they may be contributing to the tiredness. Slept only 8.5 hours last night. MEDS:  Current Facility-Administered Medications   Medication Dose Route Frequency    bisacodyL (DULCOLAX) tablet 5 mg  5 mg Oral DAILY    carBAMazepine (TEGretol) tablet 200 mg  200 mg Oral BID    cholecalciferol (VITAMIN D3) (1000 Units /25 mcg) tablet 5,000 Units  5,000 Units Oral DAILY    citalopram (CELEXA) tablet 40 mg  40 mg Oral DAILY    loxapine (LOXITANE) capsule 50 mg  50 mg Oral QHS    pantoprazole (PROTONIX) tablet 40 mg  40 mg Oral ACB    traZODone (DESYREL) tablet 100 mg  100 mg Oral QHS PRN    ibuprofen (MOTRIN) tablet 400 mg  400 mg Oral Q8H PRN    magnesium hydroxide (MILK OF MAGNESIA) 400 mg/5 mL oral suspension 30 mL  30 mL Oral DAILY PRN    nicotine (NICODERM CQ) 21 mg/24 hr patch 1 Patch  1 Patch TransDERmal DAILY PRN    hydrOXYzine pamoate (VISTARIL) capsule 50 mg  50 mg Oral TID PRN    alum-mag hydroxide-simeth (MYLANTA) oral suspension 30 mL  30 mL Oral Q4H PRN       VITALS: Patient Vitals for the past 12 hrs:   Temp Pulse Resp BP SpO2   09/23/22 0725 97.5 °F (36.4 °C) 61 18 117/64 99 %         LABS: .No results found for this or any previous visit (from the past 24 hour(s)).     MSE:   Appearance: casually dressed; fair grooming  Behavior: calm and cooperative; no agitation  Motor: normal  Mood/Affect: slightly less dysphoric; flat  Thought Process: coherent; organized  Thought Content: no delusions; some PDW thoughts  Perceptions: no hallucinations  Insight/Judgment: fair    ASSESSMENT:   1. Bipolar disorder, depressed, severe (F31.4)  2. Cocaine Use disorder (F14.20)    PLAN:  1. Continue back on the Loxapine, CBZ, and Celexa. 2.  Continue PRN Trazodone and Vistaril. 3.  Dulcolax 5 mg daily. 4.  ELOS: 3-4 days--F/U with Dr. Jocelin Sanchez set on 10/7/22.

## 2022-09-23 NOTE — BH NOTES
Affect flat. Mood depressed/anxious. Alert and oriented x 3. Refused to go to group therapy today, although encouraged. Cooperative and med compliant. Ate all of meals andsnacks. Interacts little with others. Denies SE's from meds. Denies SI/HI/AVH/pain. Refused prune juice for constipation. Abd nondistended.     PRN Medication Documentation    Specific patient behavior that led to need for PRN medication: anxiety  Staff interventions attempted prior to PRN being given: assess  PRN medication given: Vistaril 50mg po given at 1055  Patient response/effectiveness of PRN medication: decreased anxiety

## 2022-09-23 NOTE — BH NOTES
Affect constricted/agitated, mood depressed/anxious. Alert and Oriented X 4. Isolative and guarded. jCooperative  Attended and participated in group. Interacted with staff and peers. Makes needs known. Ate . snacks. Denied SI/HI/AVH and pain. Medication compliant. Stable with no s/s of distress.   Laid in bed with eyes closed for 8 hours and 30 min     PRN Medication Documentation     Specific patient behavior that led to need for PRN medication: pain 3/10 back  Staff interventions attempted prior to PRN being given repositioned  PRN medication given:trazodone 100 mg po at 2032  Patient response/effectiveness of PRN medication tolerated

## 2022-09-23 NOTE — BH NOTES
Shift change report given to Jeannette Lees re: SBAR, medications, and behavior.   Gustavo fall risk score 1

## 2022-09-23 NOTE — PROGRESS NOTES
Problem: Falls - Risk of  Goal: *Absence of Falls  Description: Document Abhishek Woods Fall Risk and appropriate interventions in the flowsheet.   Outcome: Progressing Towards Goal  Note: Fall Risk Interventions:            Medication Interventions: Teach patient to arise slowly                   Problem: Tobacco Use  Goal: *Tobacco use abstinence  Outcome: Progressing Towards Goal     Problem: Depressed Mood (Adult/Pediatric)  Goal: *STG: Attends activities and groups  Outcome: Progressing Towards Goal  Goal: *STG: Remains safe in hospital  Outcome: Progressing Towards Goal  Goal: *STG: Complies with medication therapy  Outcome: Progressing Towards Goal     Problem: Chemical Dependency (Adult/Pediatric)  Goal: *STG: Remains safe in hospital  Outcome: Progressing Towards Goal  Goal: *STG: Complies with medication therapy  Outcome: Progressing Towards Goal  Goal: *STG: Attends activities and groups  Outcome: Progressing Towards Goal

## 2022-09-23 NOTE — ROUTINE PROCESS
Shift change report given to Ruben Blue, re: SBAR. Medications, and behaviors.   Gustavo Fall Risk Score (1)

## 2022-09-23 NOTE — PROGRESS NOTES
Problem: Falls - Risk of  Goal: *Absence of Falls  Description: Document Green Salvia Fall Risk and appropriate interventions in the flowsheet.   Outcome: Progressing Towards Goal  Note: Fall Risk Interventions:            Medication Interventions: Teach patient to arise slowly                   Problem: Tobacco Use  Goal: *Tobacco use abstinence  Outcome: Progressing Towards Goal     Problem: Depressed Mood (Adult/Pediatric)  Goal: *STG: Remains safe in hospital  Outcome: Progressing Towards Goal

## 2022-09-23 NOTE — GROUP NOTE
IP  GROUP DOCUMENTATION INDIVIDUAL                                                                          Group Therapy Note  Pt did not attend group.   I will continue to encourage pt to attend in the future

## 2022-09-23 NOTE — BH NOTES
Behavioral Health Interdisciplinary Rounds     Patient Name: Jennifer Hendrix  Age: 49 y.o. Room/Bed:  232/01  Primary Diagnosis: Severe depressed bipolar I disorder without psychotic features [facility]   Admission Status: Voluntary     Readmission within 30 days: yes  Power of  in place: no  Patient requires a blocked bed: no          Reason for blocked bed:     VTE Prophylaxis: Not indicated    Mobility needs/Fall risk: yes  Flu Vaccine : no   Nutritional Plan: no  Consults: no         Labs/Testing due today?: no    Sleep hours: 8.5       Participation in Care/Groups:  yes  Medication Compliant?: Yes  PRNS (last 24 hours): Antianxiety, Sleep Aid, and Pain    Restraints (last 24 hours):  no     CIWA (range last 24 hours):     COWS (range last 24 hours):      Alcohol screening (AUDIT) completed -   AUDIT Score: 2     If applicable, date SBIRT discussed in treatment team AND documented:   AUDIT Screen Score: AUDIT Score: 2      Document Brief Intervention (corresponds directly with the 5 A's, Ask, Advise, Assess, Assist, and Arrange): At- Risk Patients (Score 7-15 for women; 8-15 for men)  Discuss concern patient is drinking at unhealthy levels known to increase risk of alcohol-related health problems. Is Patient ready to commit to change? Does not feel she has a problem    If No:  Encourage reflection  Discuss short term and long term health risks of consuming alcohol  Barriers to change  Reaffirm willingness to help / Educational materials provided  If Yes:  Set goal  Plan  Educational materials provided    Harmful use or Dependence (Score 16 or greater)  Discuss short term and long term health risks of consuming alcohol  Recommendations  Negotiate drinking goal  Recommend addiction specialist/center  Arrange follow-up appointments.     Tobacco - patient is a smoker: Have You Used Tobacco in the Past 30 Days: Yes  Illegal Drugs use: Have You Used Any Illegal Substances Over the Past 12 Months: Yes    24 hour chart check complete: yes     Patient goal(s) for today: does not have a goal  Treatment team focus/goals: Remains safe in hospital  Progress note feeling a little better, she is withdrawn, denies SI    LOS:  4  Expected LOS: 3-4    Financial concerns/prescription coverage:  no  Family contact: no      Family requesting physician contact today:  no  Discharge plan: home  Access to weapons : no        Outpatient provider(s): Dr. David Conrad  Patient's preferred phone number for follow up call : [phone]    Participating treatment team members: Yovanny Escobar, Dr. Cira PADRON, [facility]

## 2022-09-23 NOTE — PROGRESS NOTES
Problem: Depressed Mood (Adult/Pediatric)  Goal: *STG: Participates in treatment plan  Outcome: Not Progressing Towards Goal  Note: Affect flat. Mood depressed/anxious. Alert and oriented x 3. Refused to go to group therapy today, although encouraged. Cooperative and med compliant. Ate all of meals and snacks. Interacts little with others. Denies SE's from meds. Denies SI/HI/AVH/pain.

## 2022-09-23 NOTE — PROGRESS NOTES
09/22/22 2050 [facility] meeting   Attendance Attended   Number of participants 5   Time in 2000   Time out 2030   Total Time 30   MTP problem Thought disorder/psychosis   Interventions/techniques Supported;Provided feedback   Follows directions Followed directions   Interactions Interacted appropriately   Mental Status Anxious   Behavior/appearance Cooperative; Attentive   Suicide Risk Factors no suicidal ideation   Suicide Protective Factors Denies intent   Goals Achieved Able to listen to others; Able to engage in interactions; Able to give feedback to another   Denies anxiety/depression or pain.

## 2022-09-23 NOTE — MASTER TREATMENT PLAN
Master Treatment Plan Review for Jennifer Alvarado    Date of Admission Date Treatment Plan Reviewed Anticipated Date of Discharge Legal Status    09/19/2022 09/23/2022  Voluntary     Treatment & Discharge Planning Changes    Modality or Intervention Changes 09/23/2022- Carbamazepine, Metabolic Panel, CBC with Automated Diff     Psychotropic Medication Changes N/A   Discharge Planning or Target Date Changes  ELOS: 3-4 days--F/U with Dr. Vinson Dash set on 10/7/22. New Issues        Treatment Team Signatures    I have participated in the development of this plan of treatment and agree to its implementation.     Patient Signature       Patient Printed Name Date/Time   /Therapist Signature       /Therapist Printed Name Date/Time   RN Signature       RN Printed Name  Bryce Jaffe, [facility] Date/Time  09/23/2022  0415   Unit  Signature       Unit  Printed Name Date/Time   MD Signature       MD Printed Name Date/Time

## 2022-09-23 NOTE — BH NOTES
Patient continues to express concern about her upcoming court date. She does want to discharge prior to that so she doesn't miss it. She remains withdrawn, stays in her room all day. She is calm and cooperative. Her thoughts are coherent and organized. She does plan on returning home and has a scheduled appointment with Dr. Cecilia Boyd.  She is able to do her ADL care

## 2022-09-24 LAB
ALBUMIN SERPL-MCNC: 3.2 G/DL (ref 3.5–5)
ALBUMIN/GLOB SERPL: 0.9 {RATIO} (ref 1.1–2.2)
ALP SERPL-CCNC: 76 U/L (ref 45–117)
ALT SERPL-CCNC: 17 U/L (ref 12–78)
ANION GAP SERPL CALC-SCNC: 6 MMOL/L (ref 5–15)
AST SERPL-CCNC: 16 U/L (ref 15–37)
BASOPHILS # BLD: 0.1 K/UL (ref 0–0.1)
BASOPHILS NFR BLD: 1 % (ref 0–1)
BILIRUB SERPL-MCNC: 0.1 MG/DL (ref 0.2–1)
BUN SERPL-MCNC: 13 MG/DL (ref 6–20)
BUN/CREAT SERPL: 16 (ref 12–20)
CALCIUM SERPL-MCNC: 9.3 MG/DL (ref 8.5–10.1)
CHLORIDE SERPL-SCNC: 102 MMOL/L (ref 97–108)
CO2 SERPL-SCNC: 30 MMOL/L (ref 21–32)
CREAT SERPL-MCNC: 0.79 MG/DL (ref 0.55–1.02)
DIFFERENTIAL METHOD BLD: ABNORMAL
EOSINOPHIL # BLD: 0.6 K/UL (ref 0–0.4)
EOSINOPHIL NFR BLD: 6 % (ref 0–7)
ERYTHROCYTE [DISTWIDTH] IN BLOOD BY AUTOMATED COUNT: 12.7 % (ref 11.5–14.5)
GLOBULIN SER CALC-MCNC: 3.7 G/DL (ref 2–4)
GLUCOSE SERPL-MCNC: 96 MG/DL (ref 65–100)
HCT VFR BLD AUTO: 42.1 % (ref 35–47)
HGB BLD-MCNC: 14.3 G/DL (ref 11.5–16)
IMM GRANULOCYTES # BLD AUTO: 0 K/UL (ref 0–0.04)
IMM GRANULOCYTES NFR BLD AUTO: 0 % (ref 0–0.5)
LYMPHOCYTES # BLD: 3.6 K/UL (ref 0.8–3.5)
LYMPHOCYTES NFR BLD: 39 % (ref 12–49)
MCH RBC QN AUTO: 31.9 PG (ref 26–34)
MCHC RBC AUTO-ENTMCNC: 34 G/DL (ref 30–36.5)
MCV RBC AUTO: 94 FL (ref 80–99)
MONOCYTES # BLD: 0.6 K/UL (ref 0–1)
MONOCYTES NFR BLD: 6 % (ref 5–13)
NEUTS SEG # BLD: 4.5 K/UL (ref 1.8–8)
NEUTS SEG NFR BLD: 48 % (ref 32–75)
NRBC # BLD: 0 K/UL (ref 0–0.01)
NRBC BLD-RTO: 0 PER 100 WBC
PLATELET # BLD AUTO: 264 K/UL (ref 150–400)
PMV BLD AUTO: 9.9 FL (ref 8.9–12.9)
POTASSIUM SERPL-SCNC: 4.5 MMOL/L (ref 3.5–5.1)
PROT SERPL-MCNC: 6.9 G/DL (ref 6.4–8.2)
RBC # BLD AUTO: 4.48 M/UL (ref 3.8–5.2)
SODIUM SERPL-SCNC: 138 MMOL/L (ref 136–145)
WBC # BLD AUTO: 9.3 K/UL (ref 3.6–11)

## 2022-09-24 PROCEDURE — 80053 COMPREHEN METABOLIC PANEL: CPT

## 2022-09-24 PROCEDURE — 36415 COLL VENOUS BLD VENIPUNCTURE: CPT

## 2022-09-24 PROCEDURE — 80156 ASSAY CARBAMAZEPINE TOTAL: CPT

## 2022-09-24 PROCEDURE — 85025 COMPLETE CBC W/AUTO DIFF WBC: CPT

## 2022-09-24 PROCEDURE — 65220000003 HC RM SEMIPRIVATE PSYCH

## 2022-09-24 PROCEDURE — 74011250637 HC RX REV CODE- 250/637: Performed by: PSYCHIATRY & NEUROLOGY

## 2022-09-24 RX ADMIN — TRAZODONE HYDROCHLORIDE 100 MG: 100 TABLET ORAL at 21:12

## 2022-09-24 RX ADMIN — Medication 5000 UNITS: at 08:27

## 2022-09-24 RX ADMIN — IBUPROFEN 400 MG: 400 TABLET, FILM COATED ORAL at 16:22

## 2022-09-24 RX ADMIN — CITALOPRAM HYDROBROMIDE 40 MG: 20 TABLET ORAL at 08:27

## 2022-09-24 RX ADMIN — CARBAMAZEPINE 200 MG: 200 TABLET ORAL at 08:27

## 2022-09-24 RX ADMIN — LOXAPINE 50 MG: 25 CAPSULE ORAL at 21:12

## 2022-09-24 RX ADMIN — HYDROXYZINE PAMOATE 50 MG: 50 CAPSULE ORAL at 16:22

## 2022-09-24 RX ADMIN — MAGNESIUM HYDROXIDE 30 ML: 400 SUSPENSION ORAL at 08:27

## 2022-09-24 RX ADMIN — BISACODYL 5 MG: 5 TABLET, COATED ORAL at 08:27

## 2022-09-24 RX ADMIN — CARBAMAZEPINE 200 MG: 200 TABLET ORAL at 21:12

## 2022-09-24 RX ADMIN — PANTOPRAZOLE SODIUM 40 MG: 40 TABLET, DELAYED RELEASE ORAL at 06:33

## 2022-09-24 NOTE — BH NOTES
Alert and oriented x 3. Affect flat/constricted. Mood depressed /anxious. Denies SI/HI/AVH/pain. Attended group as active participant. Ate all of  meals. Nicotine patch helping to stave off anxiety and cravings for cigarettes. Showered. Ate all of meals.     PRN Medication Documentation    Specific patient behavior that led to need for PRN medication: anxiety, requested med for this  Staff interventions attempted prior to PRN being given: assess, talk with pt  PRN medication given: Vistaril 50mg po given at 1622  Patient response/effectiveness of PRN medication: decreased anxiety by 1720     PRN Medication Documentation    Specific patient behavior that led to need for PRN medication: constipation  Staff interventions attempted prior to PRN being given: assess, abd soft; nt, last bm 9/19  PRN medication given: MOM 30ml po given at   Patient response/effectiveness of PRN medication: pending    PRN Medication Documentation    Specific patient behavior that led to need for PRN medication: back pain 5/10  Staff interventions attempted prior to PRN being given: assess  PRN medication given: Motrin 400mg po given at 1622  Patient response/effectiveness of PRN medication: 0/10 back pain at 61 54 78

## 2022-09-24 NOTE — PROGRESS NOTES
09/23/22 2127 2000 [facility] meeting   Attendance Attended   Number of participants 2   Time in 2015   Time out 2045   Total Time 30   MTP problem Pain   Interventions/techniques Supported   Follows directions Followed directions   Interactions Interacted appropriately   Mental Status Calm   Behavior/appearance Cooperative   Suicide Risk Factors No thoughts of suicide   Suicide Protective Factors Denies intent   Goals Achieved Able to engage in interactions     She has no anxiety or depression at this time, but she has has lower back pain at a 6.

## 2022-09-24 NOTE — BH NOTES
Affect blunted, mood anxious. Pt attended and participated in scheduled group activities. Pt was social and appropriate with staff and peers. Pt vital signs stable with complaints of hip pain. Pt denies SI/HI/AVH. Pt ate 100% of snack. Pt was more social and talkative this evening. Pt was compliant with medications and requested PRN's. Pt is in no apparent distress at this time and made no delusional statements. Pt rested in her bed with her eyes closed for 8 hours.     PRN Medication Documentation    Specific patient behavior that led to need for PRN medication: hip pain  Staff interventions attempted prior to PRN being given: Pt request  PRN medication given: Motrin 400 mg PO @ 2115  Patient response/effectiveness of PRN medication: Pt rested     PRN Medication Documentation    Specific patient behavior that led to need for PRN medication: Restless  Staff interventions attempted prior to PRN being given: Pt request  PRN medication given: Trazodone 100 mg PO @ 2115  Patient response/effectiveness of PRN medication: Pt rested

## 2022-09-24 NOTE — PROGRESS NOTES
Problem: Tobacco Use  Goal: *Tobacco use abstinence  Outcome: Progressing Towards Goal  Note: Wearing Nicotine patch. States it \"helps to take off the edge. \" Smoking cessation discussed and ways to stop explored. Alert and oriented x 3. Affect flat/constricted. Mood depressed /anxious. Denies SI/HI/AVH/pain. Attended group as active participant.

## 2022-09-24 NOTE — PROGRESS NOTES
Patient Goal: To stay out of the bed today. Patient appetite was good, physical pain lower back level 4, no suicidal thoughts. Patient stated that she has no depression or anxiety. 09/24/22 8100 [facility] meeting   Attendance Attended   Number of participants 2   Time in 1030   Time out 1100   Total Time 30   MTP problem Depression   Interventions/techniques Provided feedback   Follows directions Followed directions   Interactions Interacted appropriately   Mental Status Depressed;Flat   Behavior/appearance Cooperative   Suicide Risk Factors No suicidal thoughts   Goals Achieved Able to listen to others; Able to receive feedback

## 2022-09-24 NOTE — PROGRESS NOTES
Problem: Falls - Risk of  Goal: *Absence of Falls  Description: Document Ramón Antonino Fall Risk and appropriate interventions in the flowsheet.   Outcome: Progressing Towards Goal  Note: Fall Risk Interventions:            Medication Interventions: Teach patient to arise slowly                   Problem: Tobacco Use  Goal: *Tobacco use abstinence  Outcome: Progressing Towards Goal     Problem: Pain  Goal: *Control of Pain  Outcome: Progressing Towards Goal     Problem: Depressed Mood (Adult/Pediatric)  Goal: *STG: Participates in treatment plan  Outcome: Progressing Towards Goal  Goal: *STG: Verbalizes anger, guilt, and other feelings in a constructive manor  Outcome: Progressing Towards Goal  Goal: *STG: Attends activities and groups  Outcome: Progressing Towards Goal  Goal: *STG: Remains safe in hospital  Outcome: Progressing Towards Goal  Goal: *STG: Complies with medication therapy  Outcome: Progressing Towards Goal     Problem: Chemical Dependency (Adult/Pediatric)  Goal: *STG: Remains safe in hospital  Outcome: Progressing Towards Goal  Goal: *STG: Complies with medication therapy  Outcome: Progressing Towards Goal

## 2022-09-24 NOTE — ROUTINE PROCESS
Shift change report given to Bridger Jones, re: SBAR. Medications, and behavior.   Gustavo Fall Risk Score (1)

## 2022-09-24 NOTE — PROGRESS NOTES
CC: \" Good \"        INTERVAL Hx:    April continues to report feeling stable and ready to go home. Reports did not sleep well last night due having back pain from sleeping on a hard bed. Appetite is fair. Denies having symptoms of depression and anxiety. No problems with current medications. Denies cravings for substance. Denies A/VH. Denies SI or plan. Patient still has not had a bowel movement. Discussed risk of impacted bowel, patient agreed to enema. Will continue with plan to discharge on Monday.         MEDS:  Current Facility-Administered Medications   Medication Dose Route Frequency    bisacodyL (DULCOLAX) tablet 5 mg  5 mg Oral DAILY    carBAMazepine (TEGretol) tablet 200 mg  200 mg Oral BID    cholecalciferol (VITAMIN D3) (1000 Units /25 mcg) tablet 5,000 Units  5,000 Units Oral DAILY    citalopram (CELEXA) tablet 40 mg  40 mg Oral DAILY    loxapine (LOXITANE) capsule 50 mg  50 mg Oral QHS    pantoprazole (PROTONIX) tablet 40 mg  40 mg Oral ACB    traZODone (DESYREL) tablet 100 mg  100 mg Oral QHS PRN    ibuprofen (MOTRIN) tablet 400 mg  400 mg Oral Q8H PRN    magnesium hydroxide (MILK OF MAGNESIA) 400 mg/5 mL oral suspension 30 mL  30 mL Oral DAILY PRN    nicotine (NICODERM CQ) 21 mg/24 hr patch 1 Patch  1 Patch TransDERmal DAILY PRN    hydrOXYzine pamoate (VISTARIL) capsule 50 mg  50 mg Oral TID PRN    alum-mag hydroxide-simeth (MYLANTA) oral suspension 30 mL  30 mL Oral Q4H PRN       VITALS: Patient Vitals for the past 12 hrs:   Temp Pulse Resp BP SpO2   09/24/22 0725 98 °F (36.7 °C) 82 16 (!) 110/52 98 %         LABS: .  Recent Results (from the past 24 hour(s))   METABOLIC PANEL, COMPREHENSIVE    Collection Time: 09/24/22  7:36 AM   Result Value Ref Range    Sodium 138 136 - 145 mmol/L    Potassium 4.5 3.5 - 5.1 mmol/L    Chloride 102 97 - 108 mmol/L    CO2 30 21 - 32 mmol/L    Anion gap 6 5 - 15 mmol/L    Glucose 96 65 - 100 mg/dL    BUN 13 6 - 20 MG/DL    Creatinine 0.79 0.55 - 1.02 MG/DL BUN/Creatinine ratio 16 12 - 20      GFR est AA >60 >60 ml/min/1.73m2    GFR est non-AA >60 >60 ml/min/1.73m2    Calcium 9.3 8.5 - 10.1 MG/DL    Bilirubin, total 0.1 (L) 0.2 - 1.0 MG/DL    ALT (SGPT) 17 12 - 78 U/L    AST (SGOT) 16 15 - 37 U/L    Alk. phosphatase 76 45 - 117 U/L    Protein, total 6.9 6.4 - 8.2 g/dL    Albumin 3.2 (L) 3.5 - 5.0 g/dL    Globulin 3.7 2.0 - 4.0 g/dL    A-G Ratio 0.9 (L) 1.1 - 2.2     CBC WITH AUTOMATED DIFF    Collection Time: 09/24/22  7:36 AM   Result Value Ref Range    WBC 9.3 3.6 - 11.0 K/uL    RBC 4.48 3.80 - 5.20 M/uL    HGB 14.3 11.5 - 16.0 g/dL    HCT 42.1 35.0 - 47.0 %    MCV 94.0 80.0 - 99.0 FL    MCH 31.9 26.0 - 34.0 PG    MCHC 34.0 30.0 - 36.5 g/dL    RDW 12.7 11.5 - 14.5 %    PLATELET 540 863 - 063 K/uL    MPV 9.9 8.9 - 12.9 FL    NRBC 0.0 0  WBC    ABSOLUTE NRBC 0.00 0.00 - 0.01 K/uL    NEUTROPHILS 48 32 - 75 %    LYMPHOCYTES 39 12 - 49 %    MONOCYTES 6 5 - 13 %    EOSINOPHILS 6 0 - 7 %    BASOPHILS 1 0 - 1 %    IMMATURE GRANULOCYTES 0 0.0 - 0.5 %    ABS. NEUTROPHILS 4.5 1.8 - 8.0 K/UL    ABS. LYMPHOCYTES 3.6 (H) 0.8 - 3.5 K/UL    ABS. MONOCYTES 0.6 0.0 - 1.0 K/UL    ABS. EOSINOPHILS 0.6 (H) 0.0 - 0.4 K/UL    ABS. BASOPHILS 0.1 0.0 - 0.1 K/UL    ABS. IMM. GRANS. 0.0 0.00 - 0.04 K/UL    DF AUTOMATED         MSE:   Appearance: casually dressed; fair grooming  Behavior: calm and cooperative; no agitation  Motor: normal  Mood/Affect: \"Good\"; flat  SI: Denies SI or plan  Thought Process: coherent; organized  Thought Content: no delusions; some PDW thoughts  Perceptions: no hallucinations  Insight/Judgment: fair    ASSESSMENT:   1. Bipolar disorder, depressed, severe (F31.4)  2. Cocaine Use disorder (F14.20)    PLAN:  Enema  D/C on Monday  3. Continue current medications  4  Continue PRN Trazodone and Vistaril.  5  Dulcolax 5 mg daily. 6.  ELOS: 3-4 days--F/U with Dr. Piña Heading set on 10/7/22.

## 2022-09-25 LAB — CARBAMAZEPINE SERPL-MCNC: 6.9 UG/ML (ref 4–12)

## 2022-09-25 PROCEDURE — 74011250637 HC RX REV CODE- 250/637

## 2022-09-25 PROCEDURE — 65220000003 HC RM SEMIPRIVATE PSYCH

## 2022-09-25 PROCEDURE — 74011000250 HC RX REV CODE- 250

## 2022-09-25 PROCEDURE — 74011250637 HC RX REV CODE- 250/637: Performed by: PSYCHIATRY & NEUROLOGY

## 2022-09-25 RX ORDER — LIDOCAINE 4 G/100G
1 PATCH TOPICAL EVERY 24 HOURS
Status: DISCONTINUED | OUTPATIENT
Start: 2022-09-25 | End: 2022-09-26 | Stop reason: HOSPADM

## 2022-09-25 RX ORDER — IBUPROFEN 400 MG/1
400 TABLET ORAL
Status: DISCONTINUED | OUTPATIENT
Start: 2022-09-25 | End: 2022-09-26 | Stop reason: HOSPADM

## 2022-09-25 RX ADMIN — LOXAPINE 50 MG: 25 CAPSULE ORAL at 21:08

## 2022-09-25 RX ADMIN — CITALOPRAM HYDROBROMIDE 40 MG: 20 TABLET ORAL at 08:14

## 2022-09-25 RX ADMIN — IBUPROFEN 400 MG: 400 TABLET, FILM COATED ORAL at 18:39

## 2022-09-25 RX ADMIN — PANTOPRAZOLE SODIUM 40 MG: 40 TABLET, DELAYED RELEASE ORAL at 06:45

## 2022-09-25 RX ADMIN — CARBAMAZEPINE 200 MG: 200 TABLET ORAL at 08:14

## 2022-09-25 RX ADMIN — HYDROXYZINE PAMOATE 50 MG: 50 CAPSULE ORAL at 16:18

## 2022-09-25 RX ADMIN — CARBAMAZEPINE 200 MG: 200 TABLET ORAL at 21:07

## 2022-09-25 RX ADMIN — SODIUM PHOSPHATE, DIBASIC AND SODIUM PHOSPHATE, MONOBASIC 1 ENEMA: 7; 19 ENEMA RECTAL at 15:00

## 2022-09-25 RX ADMIN — Medication 5000 UNITS: at 08:14

## 2022-09-25 RX ADMIN — BISACODYL 5 MG: 5 TABLET, COATED ORAL at 08:14

## 2022-09-25 RX ADMIN — IBUPROFEN 400 MG: 400 TABLET, FILM COATED ORAL at 12:20

## 2022-09-25 RX ADMIN — TRAZODONE HYDROCHLORIDE 100 MG: 100 TABLET ORAL at 21:07

## 2022-09-25 NOTE — BH NOTES
Affect blunted, mood anxious. Pt attended and participated in scheduled group activities. Pt was social with staff and peers. Pt vital signs stable although her blood pressure was slightly elevated. Pt denies SI/HI/AVH. Pt ate 100% of snack. Pt still remains isolative and withdrawn at times but is more interactive in the evening for a brief period of time. Pt was compliant with medications and requested a PRN. Pt is in no apparent distress at this time and made no delusional statements. Pt rested in her bed with her eyes closed for 8 hours.     PRN Medication Documentation    Specific patient behavior that led to need for PRN medication: restless  Staff interventions attempted prior to PRN being given: Pt request  PRN medication given: Trazodone 100 mg PO @ 2112  Patient response/effectiveness of PRN medication: Pt rested

## 2022-09-25 NOTE — PROGRESS NOTES
09/24/22 2144 2000 [facility] meeting   Attendance Attended   Number of participants 3   Time in 2000   Time out 2030   Total Time 30   Interventions/techniques Supported   Follows directions Followed directions   Interactions Interacted appropriately   Suicide Risk Factors No thoughts of harming self   Suicide Protective Factors Denies intent   Goals Achieved Able to engage in interactions     She has no anxiety, depression, or pain at this time.

## 2022-09-25 NOTE — PROGRESS NOTES
Problem: Depressed Mood (Adult/Pediatric)  Goal: *STG: Complies with medication therapy  Outcome: Progressing Towards Goal  Note: Affect blunted. Mood anxious/depressed. Alert and oriented x 3. States she is ready to go home tomorrow. Ate all of meals. No BM since 6 days. NP notified. Med compliant. Denies SI/HI/AVH/pain.

## 2022-09-25 NOTE — PROGRESS NOTES
Pt goal for today was she hopes her hip stop hurting    No complaints of anxiety or depression    Hip pain 7/10       09/25/22 234 [facility] meeting   Attendance Attended   Number of participants 3   Time in 1030   Time out 1100   Total Time 30   Follows directions Followed directions   Interactions Interacted appropriately   Mental Status Calm   Behavior/appearance Attentive; Cooperative   Long Term Risk of Suicide Low   Immediate Risk for Suicide Low   Suicide Protective Factors Denies intent   Risk of Violence Low   Risk of Trauma Low   Goals Achieved Able to engage in interactions; Able to receive feedback; Able to listen to others; Able to experience relief/decrease in symptoms; Able to give feedback to another;Able to self-disclose; Able to reflect/comment on own behavior;Able to manage/cope with feelings

## 2022-09-26 VITALS
BODY MASS INDEX: 19.82 KG/M2 | HEART RATE: 71 BPM | OXYGEN SATURATION: 98 % | HEIGHT: 66 IN | SYSTOLIC BLOOD PRESSURE: 111 MMHG | WEIGHT: 123.3 LBS | DIASTOLIC BLOOD PRESSURE: 62 MMHG | TEMPERATURE: 98.7 F | RESPIRATION RATE: 16 BRPM

## 2022-09-26 PROCEDURE — 74011250637 HC RX REV CODE- 250/637: Performed by: PSYCHIATRY & NEUROLOGY

## 2022-09-26 PROCEDURE — 99239 HOSP IP/OBS DSCHRG MGMT >30: CPT | Performed by: PSYCHIATRY & NEUROLOGY

## 2022-09-26 RX ORDER — LOXAPINE SUCCINATE 50 MG/1
50 TABLET ORAL
Qty: 30 CAPSULE | Refills: 0 | Status: SHIPPED | OUTPATIENT
Start: 2022-09-26

## 2022-09-26 RX ORDER — LIDOCAINE 4 G/100G
1 PATCH TOPICAL EVERY 24 HOURS
Qty: 30 PATCH | Refills: 0 | Status: SHIPPED | OUTPATIENT
Start: 2022-09-26

## 2022-09-26 RX ORDER — TRAZODONE HYDROCHLORIDE 100 MG/1
100 TABLET ORAL
Qty: 30 TABLET | Refills: 0 | Status: SHIPPED | OUTPATIENT
Start: 2022-09-26

## 2022-09-26 RX ORDER — CITALOPRAM 40 MG/1
40 TABLET, FILM COATED ORAL DAILY
Qty: 30 TABLET | Refills: 0 | Status: SHIPPED | OUTPATIENT
Start: 2022-09-26

## 2022-09-26 RX ORDER — CARBAMAZEPINE 200 MG/1
200 TABLET ORAL 2 TIMES DAILY
Qty: 60 TABLET | Refills: 0 | Status: SHIPPED | OUTPATIENT
Start: 2022-09-26

## 2022-09-26 RX ORDER — IBUPROFEN 200 MG
1 TABLET ORAL EVERY 24 HOURS
Qty: 30 PATCH | Refills: 0 | Status: SHIPPED | OUTPATIENT
Start: 2022-09-26 | End: 2022-10-26

## 2022-09-26 RX ADMIN — BISACODYL 5 MG: 5 TABLET, COATED ORAL at 08:35

## 2022-09-26 RX ADMIN — PANTOPRAZOLE SODIUM 40 MG: 40 TABLET, DELAYED RELEASE ORAL at 06:32

## 2022-09-26 RX ADMIN — CITALOPRAM HYDROBROMIDE 40 MG: 20 TABLET ORAL at 08:35

## 2022-09-26 RX ADMIN — CARBAMAZEPINE 200 MG: 200 TABLET ORAL at 08:35

## 2022-09-26 RX ADMIN — Medication 5000 UNITS: at 08:35

## 2022-09-26 NOTE — BH NOTES
PSYCHOSOCIAL ASSESSMENT  :Patient identifying info:   Jennifer De Luna is a 49 y.o., female admitted 9/19/2022 11:18 AM     Presenting problem and precipitating factors: feeling quite hopeless and even having suicidal thoughts about wanting to run her car off of the road into a tree. Mental status assessment: Appearance: casually dressed; fair grooming  Behavior: calm and cooperative; no agitation  Motor: normal  Mood/Affect: very dysphoric and flat  Thought Process: coherent; organized  Thought Content: no delusions; no SI/HI  Perceptions: no hallucinations  Insight/Judgment: fair    Strengths/Recreation/Coping Skills:has a place to return to, connected with services    Collateral information: patient    Current psychiatric /substance abuse providers and contact info: Dr. Jaspal Hollins    Previous psychiatric/substance abuse providers and response to treatment: same    Family history of mental illness or substance abuse: yes    Substance abuse history:  yes  Social History     Tobacco Use    Smoking status: Every Day     Packs/day: 1.00     Types: Cigarettes    Smokeless tobacco: Never   Substance Use Topics    Alcohol use: Not Currently       History of biomedical complications associated with substance abuse: denies    Patient's current acceptance of treatment or motivation for change: uses intermittently     Family constellation: mother, 3 children    Is significant other involved?  N/a    Describe support system: mother    Describe living arrangements and home environment: lives alone in a trailer     [address]: NO    Guardian Name:     Guardian Contact:     Health issues:   Hospital Problems  Date Reviewed: 9/26/2022            Codes Class Noted POA    [facility] ICD-10-CM: X77.93  ICD-9-CM: 304.22  9/20/2022 Unknown        * (Principal) Severe depressed bipolar I disorder without psychotic features [facility]) ICD-10-CM: F31.4  ICD-9-CM: 296.53  8/22/2022 Yes           Trauma history: denies    Legal issues: current shop lifting charges    History of  service: denies    Financial status: SSDI    Judaism/cultural factors: none expressed    Education/work history: unemployed    Have you been licensed as a health care professional (current or ): denies    Describe coping skills:none    Irene Cole  2022

## 2022-09-26 NOTE — DISCHARGE INSTRUCTIONS
BEHAVIORAL HEALTH NURSING DISCHARGE NOTE      The following personal items collected during your admission are returned to you:   Dental Appliance: Dental Appliances: None  Vision: Visual Aid: Glasses, With patient  Hearing Aid:    Jewelry: Jewelry: None  Clothing: Clothing: Shirt, Pants, Pajamas, With patient  Other Valuables: Other Valuables: Cell Phone  Valuables sent to safe:        PATIENT INSTRUCTIONS:    What to do at home: You may not drink any alcoholic beverages during the next 48-hours. You may resume normal activities. Avoid making any critical decisions for at least 24-hours. Recommended diet: Regular Diet. Recommended activity: Activity as tolerated. National Suicide Prevention Line: 7-954-823-133-245-6144. [place] [facility] 0-509.720.8545. Smoking Cessation Hotline 9-404-QUIT NOW (808-6311). If you have problems relating to your recovery, call your physician. The discharge information has been reviewed with the patient. The patient verbalized understanding.

## 2022-09-26 NOTE — BH NOTES
Affect blunted, mood depressed. Pt attended and participated in scheduled group activities. Pt vital signs stable with no complaints of pain. Pt ate 100% of snack. Pt was social with staff and peers briefly but remains mostly isolative and withdrawn to her room. Pt denies SI/HI/AVH. Pt was compliant with medications and requested a PRN. Pt is in no apparent distress a this time and is under the impression she will be discharging Monday. Pt stated that she is looking forward to discharge. Pt made no delusional statements. Pt rested in her bed with her eyes closed for 8 hours.     PRN Medication Documentation    Specific patient behavior that led to need for PRN medication: restless  Staff interventions attempted prior to PRN being given: Pt request  PRN medication given: Trazodone 100 mg PO @ 2107  Patient response/effectiveness of PRN medication: Pt rested

## 2022-09-26 NOTE — PROGRESS NOTES
09/25/22 2112 2000 [facility] meeting   Attendance Attended   Number of participants 4   Time in 2000   Time out 2030   Total Time 30   Interventions/techniques Supported   Follows directions Followed directions   Interactions Interacted appropriately   Mental Status Calm   Behavior/appearance Cooperative   Suicide Risk Factors No thoughts of harming self. Suicide Protective Factors Denies intent   Goals Achieved Able to engage in interactions     She has no anxiety, depression, or pain at this time.

## 2022-09-26 NOTE — DISCHARGE SUMMARY
[facility] DISCHARGE    Name:  Samuel Smith  MR#:  [id number]  :  1975  ACCOUNT #:  [de-identified]  ADMIT DATE:  2022  DISCHARGE DATE:  2022    [facility] DISCHARGE SUMMARY    NOTE:  Greater than 35 minutes was spent in the preparation of this discharge. DISCHARGE DIAGNOSES:  AXIS I:  1. Bipolar disorder, depressed, severe - rapid cycling (F31.4). 2.  Cocaine use disorder (F14.20). AXIS II:  Deferred. AXIS III:  History of hepatitis C; prior overdoses. AXIS IV:  Stressors are moderate to severe (loss of boyfriend and lack of support, financial concerns). AXIS V:  Global Assessment of Functioning at discharge is 75. DISCHARGE MEDICATIONS:  1. Loxapine 50 mg at bedtime. 2.  Trazodone 100 mg at bedtime p.r.n. for sleep. 3.  Tegretol 200 mg b.i.d.  4.  Celexa 40 mg daily. 5.  Lidocaine patch 4% one daily, 12 hours on and 12 hours off.  6.  Nicoderm patch 21 mg daily. She was given a 1-month prescription for each of these six medicines, with no refills on the prescriptions. The Nicoderm prescription was printed as she likely will throw it away, given that she plans to continue smoking. DISPOSITION/FOLLOWUP PLANS:  The patient is being discharged in stable condition this morning on 2022. She has a followup appointment with Dr. Jing Triplett on 10/07/2022, which she is fully aware of. She is also going to court for an arraignment hearing on 2022, and she states that she is not overly stressed about that at this point. HOSPITAL COURSE:  As noted in the admission note, the patient is an 50-year-old single white female who has a past psychiatric history of bipolar disorder, as well as polysubstance abuse, particularly cocaine. She has just been hospitalized here from  through 2022 with similar symptoms of worsened depression, which had essentially been ongoing since her boyfriend of 6 years  from cancer on 2022.   Since his death, she had made at least three small suicide attempts by overdosing, the last of which was in 06/2022, and when she was admitted this past time, we switched the Geodon that she had been on over to low-dose loxapine and started trazodone instead of amitriptyline for sleeping. We continued her on the carbamazepine and Celexa that she had been taking for some time, and she was doing quite well by the time of her discharge. However, when she got out of the hospital, she never filled the prescriptions, and did not establish a followup appointment with Dr. Todd Perez like she had indicated she would. She also continued to utilize some cocaine, but it is unclear how heavily that was, but her mood began to deteriorate again. She states that she was starting to have the suicidal thoughts re-emerge which is why she took herself to the emergency room and ended up hospitalized. Once here, she basically stayed to herself and in bed most of the time early on, and began to interact with others a little more by the time of her discharge. This tended to mirror her hospital stay several weeks earlier. She was never a behavioral or management problem and she remained calm and cooperative. She was never agitated or hostile, and never exhibited any manic or hypomanic symptoms, or any symptoms of psychosis. She tolerated getting back on the medications at their full dosages noted above, and although it did cause some tiredness initially, she had no other side effects including no EPS or symptoms of TD.   She was feeling much more hopeful and optimistic by the time of her discharge, and she stated she was fully prepared to go to court, and deal with the consequences of some shoplifting charges that she has had, and she was adamantly denying any suicidal thoughts, feelings of hopelessness, etc.  She also was fully aware of the importance of remaining clean and sober going forward, and the potential risk to her mood if she relapsed. LABORATORY DATA:  On admission, her laboratory tests were unremarkable except for a slightly low potassium at 3.3 which normalized to 4.5 by 09/24. Urine drug screen was positive for cocaine, but alcohol level was 0. An EKG showed normal QTC interval of 438 milliseconds. Carbamazepine level was 6.9 when checked on 09/24 that being about 10.5 to 11 hours after a dose.       Kavon Holt MD      [id number]  D:  09/26/2022 10:18  T:  09/26/2022 13:02  JOB #:  [id number]  CC:  Alexandra Rawls MD

## 2022-09-26 NOTE — BH NOTES
Behavioral Health Transition Record to Provider    Patient Name: Siri Dailey  YOB: 1975  Medical Record Number: [id number]  Date of Admission: 9/19/2022  Date of Discharge: 9/26/22    Attending Provider: Belkis Mojica MD  Discharging Provider: Kyle Pollack  To contact this individual call [phone] and ask the  to page. If unavailable, ask to be transferred to [address] [place] [address] Provider on call. [facility] ER Provider will be available on call 24/7 and during holidays. Primary Care Provider: Jen Navarrete MD    Allergies   Allergen Reactions    Oxycodone-Acetaminophen Hives     itching         Reason for Admission: Depression with SI    Ad[facility] Diagnosis: B[facility] 1 disorder ([facility] [F31.9]    * No surgery found *    Results for orders placed or performed during the hospital encounter of 09/19/22   COVID-19 WITH INFLUENZA A/B   Result Value Ref Range    SARS-CoV-2 by PCR Not detected NOTD      Influenza A by PCR Not detected NOTD      Influenza B by PCR Not detected NOTD     CBC WITH AUTOMATED DIFF   Result Value Ref Range    WBC 6.9 3.6 - 11.0 K/uL    RBC 4.84 3.80 - 5.20 M/uL    HGB 15.4 11.5 - 16.0 g/dL    HCT 45.1 35.0 - 47.0 %    MCV 93.2 80.0 - 99.0 FL    MCH 31.8 26.0 - 34.0 PG    MCHC 34.1 30.0 - 36.5 g/dL    RDW 13.0 11.5 - 14.5 %    PLATELET 711 694 - 816 K/uL    MPV 9.4 8.9 - 12.9 FL    NRBC 0.0 0  WBC    ABSOLUTE NRBC 0.00 0.00 - 0.01 K/uL    NEUTROPHILS 40 32 - 75 %    LYMPHOCYTES 48 12 - 49 %    MONOCYTES 6 5 - 13 %    EOSINOPHILS 5 0 - 7 %    BASOPHILS 1 0 - 1 %    IMMATURE GRANULOCYTES 0 0.0 - 0.5 %    ABS. NEUTROPHILS 2.8 1.8 - 8.0 K/UL    ABS. LYMPHOCYTES 3.3 0.8 - 3.5 K/UL    ABS. MONOCYTES 0.4 0.0 - 1.0 K/UL    ABS. EOSINOPHILS 0.4 0.0 - 0.4 K/UL    ABS. BASOPHILS 0.1 0.0 - 0.1 K/UL    ABS. IMM.  GRANS. 0.0 0.00 - 0.04 K/UL    DF AUTOMATED     METABOLIC PANEL, COMPREHENSIVE   Result Value Ref Range    Sodium 143 136 - 145 mmol/L    Potassium 3.3 (L) 3.5 - 5.1 mmol/L    Chloride 105 97 - 108 mmol/L    CO2 28 21 - 32 mmol/L    Anion gap 10 5 - 15 mmol/L    Glucose 82 65 - 100 mg/dL    BUN 4 (L) 6 - 20 MG/DL    Creatinine 0.71 0.55 - 1.02 MG/DL    BUN/Creatinine ratio 6 (L) 12 - 20      GFR est AA >60 >60 ml/min/1.73m2    GFR est non-AA >60 >60 ml/min/1.73m2    Calcium 9.2 8.5 - 10.1 MG/DL    Bilirubin, total 0.2 0.2 - 1.0 MG/DL    ALT (SGPT) 18 12 - 78 U/L    AST (SGOT) 20 15 - 37 U/L    Alk.  phosphatase 89 45 - 117 U/L    Protein, total 7.5 6.4 - 8.2 g/dL    Albumin 3.6 3.5 - 5.0 g/dL    Globulin 3.9 2.0 - 4.0 g/dL    A-G Ratio 0.9 (L) 1.1 - 2.2     ETHYL ALCOHOL   Result Value Ref Range    ALCOHOL(ETHYL),SERUM <10 <10 MG/DL   URINALYSIS W/ RFLX MICROSCOPIC   Result Value Ref Range    Color YELLOW/STRAW      Appearance CLEAR CLEAR      Specific gravity 1.010 1.003 - 1.030      pH (UA) 6.5 5.0 - 8.0      Protein Negative NEG mg/dL    Glucose Negative NEG mg/dL    Ketone Negative NEG mg/dL    Bilirubin Negative NEG      Blood Negative NEG      Urobilinogen 1.0 0.2 - 1.0 EU/dL    Nitrites Negative NEG      Leukocyte Esterase Negative NEG     HCG URINE, QL   Result Value Ref Range    HCG urine, QL Negative NEG     DRUG SCREEN, URINE   Result Value Ref Range    AMPHETAMINES Negative NEG      BARBITURATES Negative NEG      BENZODIAZEPINES Negative NEG      COCAINE Positive (A) NEG      METHADONE Negative NEG      OPIATES Negative NEG      PCP(PHENCYCLIDINE) Negative NEG      THC (TH-CANNABINOL) Negative NEG      Drug screen comment (NOTE)    ACETAMINOPHEN   Result Value Ref Range    Acetaminophen level <2 (L) 10 - 30 ug/mL   SALICYLATE   Result Value Ref Range    Salicylate level 5.2 2.8 - 20.0 MG/DL   CARBAMAZEPINE   Result Value Ref Range    Carbamazepine 6.9 4.0 - 42.9 ug/mL   METABOLIC PANEL, COMPREHENSIVE   Result Value Ref Range    Sodium 138 136 - 145 mmol/L    Potassium 4.5 3.5 - 5.1 mmol/L    Chloride 102 97 - 108 mmol/L    CO2 30 21 - 32 mmol/L    Anion gap 6 5 - 15 mmol/L    Glucose 96 65 - 100 mg/dL    BUN 13 6 - 20 MG/DL    Creatinine 0.79 0.55 - 1.02 MG/DL    BUN/Creatinine ratio 16 12 - 20      GFR est AA >60 >60 ml/min/1.73m2    GFR est non-AA >60 >60 ml/min/1.73m2    Calcium 9.3 8.5 - 10.1 MG/DL    Bilirubin, total 0.1 (L) 0.2 - 1.0 MG/DL    ALT (SGPT) 17 12 - 78 U/L    AST (SGOT) 16 15 - 37 U/L    Alk. phosphatase 76 45 - 117 U/L    Protein, total 6.9 6.4 - 8.2 g/dL    Albumin 3.2 (L) 3.5 - 5.0 g/dL    Globulin 3.7 2.0 - 4.0 g/dL    A-G Ratio 0.9 (L) 1.1 - 2.2     CBC WITH AUTOMATED DIFF   Result Value Ref Range    WBC 9.3 3.6 - 11.0 K/uL    RBC 4.48 3.80 - 5.20 M/uL    HGB 14.3 11.5 - 16.0 g/dL    HCT 42.1 35.0 - 47.0 %    MCV 94.0 80.0 - 99.0 FL    MCH 31.9 26.0 - 34.0 PG    MCHC 34.0 30.0 - 36.5 g/dL    RDW 12.7 11.5 - 14.5 %    PLATELET 441 334 - 998 K/uL    MPV 9.9 8.9 - 12.9 FL    NRBC 0.0 0  WBC    ABSOLUTE NRBC 0.00 0.00 - 0.01 K/uL    NEUTROPHILS 48 32 - 75 %    LYMPHOCYTES 39 12 - 49 %    MONOCYTES 6 5 - 13 %    EOSINOPHILS 6 0 - 7 %    BASOPHILS 1 0 - 1 %    IMMATURE GRANULOCYTES 0 0.0 - 0.5 %    ABS. NEUTROPHILS 4.5 1.8 - 8.0 K/UL    ABS. LYMPHOCYTES 3.6 (H) 0.8 - 3.5 K/UL    ABS. MONOCYTES 0.6 0.0 - 1.0 K/UL    ABS. EOSINOPHILS 0.6 (H) 0.0 - 0.4 K/UL    ABS. BASOPHILS 0.1 0.0 - 0.1 K/UL    ABS. IMM. GRANS. 0.0 0.00 - 0.04 K/UL    DF AUTOMATED     EKG, 12 LEAD, INITIAL   Result Value Ref Range    Ventricular Rate 70 BPM    Atrial Rate 70 BPM    P-R Interval 118 ms    QRS Duration 86 ms    Q-T Interval 406 ms    QTC Calculation (Bezet) 438 ms    Calculated P Axis 49 degrees    Calculated R Axis 44 degrees    Calculated T Axis 45 degrees    Diagnosis       Sinus rhythm with Sinus arrhythmia  Otherwise normal ECG  No previous ECGs available  Confirmed by Peterson Pearson MD, Maribell Patel ([id number]) on 9/20/2022 6:07:54 PM         Immunizations administered during this encounter: There is no immunization history on file for this patient.     Screening for Metabolic Disorders for Patients on Antipsychotic Medications  (Data obtained from the EMR)    Estimated Body Mass Index  Estimated body mass index is 19.9 kg/m² as calculated from the following:    Height as of this encounter: 5' 6\" (1.676 m). Weight as of this encounter: 55.9 kg (123 lb 4.8 oz). Vital Signs/Blood Pressure  Visit Vitals  /62 (BP 1 Location: Right upper arm, BP Patient Position: Supine)   Pulse 71   Temp 98.7 °F (37.1 °C)   Resp 16   Ht 5' 6\" (1.676 m)   Wt 55.9 kg (123 lb 4.8 oz)   SpO2 98%   BMI 19.90 kg/m²       Blood Glucose/Hemoglobin A1c  Lab Results   Component Value Date/Time    Glucose 96 09/24/2022 07:36 AM       Lab Results   Component Value Date/Time    Hemoglobin A1c 5.4 08/23/2022 05:54 AM        Lipid Panel  Lab Results   Component Value Date/Time    Cholesterol, total 162 08/23/2022 05:54 AM    HDL Cholesterol 38 08/23/2022 05:54 AM    LDL, calculated 105.2 (H) 08/23/2022 05:54 AM    Triglyceride 94 08/23/2022 05:54 AM    CHOL/HDL Ratio 4.3 08/23/2022 05:54 AM        Discharge Diagnosis: Bipolar disorder, depressed, severe (F31.4)    Discharge Plan: Discharge plan of care/case management plan validated with provider discharge order. Discharge Medication List and Instructions:   Current Discharge Medication List        START taking these medications    Details   lidocaine 4 % patch 1 Patch by TransDERmal route every twenty-four (24) hours. Qty: 30 Patch, Refills: 0  Start date: 9/26/2022           CONTINUE these medications which have CHANGED    Details   carBAMazepine (TEGretol) 200 mg tablet Take 1 Tablet by mouth two (2) times a day. Qty: 60 Tablet, Refills: 0  Start date: 9/26/2022      citalopram (CELEXA) 40 mg tablet Take 1 Tablet by mouth daily. Qty: 30 Tablet, Refills: 0  Start date: 9/26/2022      loxapine (LOXITANE) 50 mg capsule Take 1 Capsule by mouth nightly.   Qty: 30 Capsule, Refills: 0  Start date: 9/26/2022      nicotine (NICODERM CQ) 21 mg/24 hr 1 Patch by TransDERmal route every twenty-four (24) hours for 30 days. Qty: 30 Patch, Refills: 0  Start date: 9/26/2022, End date: 10/26/2022      traZODone (DESYREL) 100 mg tablet Take 1 Tablet by mouth nightly as needed for Sleep. Qty: 30 Tablet, Refills: 0  Start date: 9/26/2022           CONTINUE these medications which have NOT CHANGED    Details   pantoprazole (PROTONIX) 40 mg tablet TAKE 1 TABLET BY MOUTH ONCE DAILY      cholecalciferol (VITAMIN D3) (5000 Units/125 mcg) tab tablet Take  by mouth daily. STOP taking these medications       clonazePAM (KlonoPIN) 0.5 mg tablet Comments:   Reason for Stopping:               Unresulted Labs (24h ago, onward)      None          To obtain results of studies pending at discharge, please contact     Follow-up Information       Follow up With Specialties Details Why Contact Info    Paula Jaraleisa, [address]   [address]  [phone]      Dr. Saurav Olguin on 10/7/2022 at 11:45, For medication management [address] [place] [address], [address] [place] [address]  [phone]            Advanced Directive:   Does the patient have an appointed surrogate decision maker? No  Does the patient have a Medical Advance Directive? No  Does the patient have a Psychiatric Advance Directive? No  If the patient does not have a surrogate or Medical Advance Directive AND Psychiatric Advance Directive, the patient was offered information on these advance directives Yes and Patient will complete at a later time    Patient Instructions: Please continue all medications until otherwise directed by physician. Tobacco Cessation Discharge Plan:   Is the patient a smoker and needs referral for smoking cessation? Yes  Patient referred to the following for smoking cessation with an appointment? Yes     Patient was offered medication to assist with smoking cessation at discharge?  Yes  Was education for smoking cessation added to the discharge instructions? Yes    Alcohol/Substance Abuse Discharge Plan:   Does the patient have a history of substance/alcohol abuse and requires a referral for treatment? Yes  Patient referred to the following for substance/alcohol abuse treatment with an appointment? Yes  Patient was offered medication to assist with alcohol cessation at discharge? Not applicable  Was education for substance/alcohol abuse added to discharge instructions? Yes    Patient discharged to Home; discussed with patient/caregiver and provided to the patient/caregiver either in hard copy or electronically.

## 2022-09-26 NOTE — ROUTINE PROCESS
Shift change report given to Indira Kelley, re: SBAR. Medications, and behavior.   Gustavo Fall Risk Score (1)

## 2022-09-26 NOTE — BH NOTES
Discharge Note:    Patient discharged home today. Alert and oriented x 4. Denies pain. No SI/HI/AVH. No delusional or paranoid statements made. Patient cooperative and pleasant. Medication compliant and no PRN's given. Appetite good. Ate 100% of breakfast/snack. Patient attended and engaged in group with prompting. Patient in no apparent distress. Vitals signs within normal limits. RN reviewed discharge instructions and orders, which included medications, drug name, purpose, dosage, administration time , route and adverse effects. Patient verbalized understanding and written copies provided of discharge orders and instructions. Prescriptions sent into [facility] in [place], [place]. Also provided with suicidal hotline and crisis stabilization line. Patient left the unit at 1010 wearing a face mask and accompanied by staff. All personal belongings and valuables sent with patient.

## 2022-09-26 NOTE — PROGRESS NOTES
Problem: Falls - Risk of  Goal: *Absence of Falls  Description: Document Charlie Ogden Fall Risk and appropriate interventions in the flowsheet.   Outcome: Progressing Towards Goal  Note: Fall Risk Interventions:            Medication Interventions: Teach patient to arise slowly                   Problem: Tobacco Use  Goal: *Tobacco use abstinence  Outcome: Progressing Towards Goal     Problem: Pain  Goal: *Control of Pain  Outcome: Progressing Towards Goal     Problem: Depressed Mood (Adult/Pediatric)  Goal: *STG: Participates in treatment plan  Outcome: Progressing Towards Goal

## 2022-09-26 NOTE — BH NOTES
Patient will be discharged to home on this date. She will follow up with Dr. Arie Muller for medication management on  at 11:45. BH transition of care was routed to Dr. Astrid Sanchez office and she was given a copy to take with her as well. She was involved and agreeable in her discharge plan.

## 2022-10-18 NOTE — BH NOTES
Affect blunted. Mood anxious/depressed. Alert and oriented x 3. States she is ready to go home tomorrow. Ate all of meals. No BM since 6 days. NP notified. Med compliant. Denies SI/HI/AVH/pain. Fleets enema given at 1500 with a small soft BM resulting.      PRN Medication Documentation    Specific patient behavior that led to need for PRN medication: urge to smoke  Staff interventions attempted prior to PRN being given: assess  PRN medication given: Nicotine patch applied at 0816  Patient response/effectiveness of PRN medication: decreased urge to smoke    PRN Medication Documentation    Specific patient behavior that led to need for PRN medication: 6/10 back pain  Staff interventions attempted prior to PRN being given: assess  PRN medication given: Motrin 400mg po given at 1220  Patient response/effectiveness of PRN medication: sleeping with resp of 16 at 1320      PRN Medication Documentation    Specific patient behavior that led to need for PRN medication: anxiety, request for anxiety med, racing thoughts  Staff interventions attempted prior to PRN being given: assess,smiling  PRN medication given: Vistaril 50mg po given at 1618  Patient response/effectiveness of PRN medication:calm, laughing     PRN Medication Documentation    Specific patient behavior that led to need for PRN medication: lower back pain 6/10  Staff interventions attempted prior to PRN being given: assess, call NP to change Motrin frequency and to order Lidoderm patch  PRN medication given: apply Lidoderm patch to lower back and give Motrin 400mg po at [facility]  Patient response/effectiveness of PRN medication: pending V-Y Plasty Text: The defect edges were debeveled with a #15 scalpel blade.  Given the location of the defect, shape of the defect and the proximity to free margins an V-Y advancement flap was deemed most appropriate.  Using a sterile surgical marker, an appropriate advancement flap was drawn incorporating the defect and placing the expected incisions within the relaxed skin tension lines where possible.    The area thus outlined was incised deep to adipose tissue with a #15 scalpel blade.  The skin margins were undermined to an appropriate distance in all directions utilizing iris scissors.

## 2024-10-25 ENCOUNTER — HOSPITAL ENCOUNTER (OUTPATIENT)
Facility: HOSPITAL | Age: 49
Discharge: HOME | End: 2024-10-28
Payer: MEDICAID

## 2024-10-25 DIAGNOSIS — R06.02 SHORTNESS OF BREATH: ICD-10-CM

## 2024-10-25 PROCEDURE — 71046 X-RAY EXAM CHEST 2 VIEWS: CPT

## 2025-02-20 ENCOUNTER — HOSPITAL ENCOUNTER (INPATIENT)
Facility: HOSPITAL | Age: 50
LOS: 6 days | Discharge: HOME | DRG: 753 | End: 2025-02-26
Attending: PSYCHIATRY & NEUROLOGY | Admitting: PSYCHIATRY & NEUROLOGY
Payer: MEDICAID

## 2025-02-20 DIAGNOSIS — F31.9 BIPOLAR 1 DISORDER (HCC): Primary | ICD-10-CM

## 2025-02-20 PROBLEM — F39 UNSPECIFIED MOOD (AFFECTIVE) DISORDER: Status: ACTIVE | Noted: 2025-02-20

## 2025-02-20 PROCEDURE — 1240000000 HC EMOTIONAL WELLNESS R&B

## 2025-02-21 PROBLEM — F31.9 BIPOLAR 1 DISORDER (HCC): Status: ACTIVE | Noted: 2025-02-21

## 2025-02-21 PROBLEM — F39 UNSPECIFIED MOOD (AFFECTIVE) DISORDER: Status: RESOLVED | Noted: 2025-02-20 | Resolved: 2025-02-21

## 2025-02-21 PROBLEM — F14.20 COCAINE USE DISORDER, SEVERE, DEPENDENCE (HCC): Status: ACTIVE | Noted: 2022-09-20

## 2025-02-21 PROBLEM — F60.3 BORDERLINE PERSONALITY DISORDER (HCC): Status: ACTIVE | Noted: 2025-02-21

## 2025-02-21 PROBLEM — F31.9 BIPOLAR 1 DISORDER (HCC): Status: RESOLVED | Noted: 2025-02-21 | Resolved: 2025-02-21

## 2025-02-21 PROBLEM — F31.4 BIPOLAR 1 DISORDER, DEPRESSED, SEVERE (HCC): Status: ACTIVE | Noted: 2022-08-22

## 2025-02-21 LAB
CARBAMAZEPINE SERPL-MCNC: 2.7 UG/ML (ref 4–12)
DATE LAST DOSE: ABNORMAL
DOSE AMOUNT: ABNORMAL UNITS

## 2025-02-21 PROCEDURE — 80061 LIPID PANEL: CPT

## 2025-02-21 PROCEDURE — 36415 COLL VENOUS BLD VENIPUNCTURE: CPT

## 2025-02-21 PROCEDURE — 1240000000 HC EMOTIONAL WELLNESS R&B

## 2025-02-21 PROCEDURE — 80156 ASSAY CARBAMAZEPINE TOTAL: CPT

## 2025-02-21 PROCEDURE — 6370000000 HC RX 637 (ALT 250 FOR IP): Performed by: PSYCHIATRY & NEUROLOGY

## 2025-02-21 PROCEDURE — 6370000000 HC RX 637 (ALT 250 FOR IP): Performed by: HOSPITALIST

## 2025-02-21 PROCEDURE — 83036 HEMOGLOBIN GLYCOSYLATED A1C: CPT

## 2025-02-21 RX ORDER — GABAPENTIN 100 MG/1
100 CAPSULE ORAL 2 TIMES DAILY
Status: DISCONTINUED | OUTPATIENT
Start: 2025-02-21 | End: 2025-02-26 | Stop reason: HOSPADM

## 2025-02-21 RX ORDER — CETIRIZINE HYDROCHLORIDE 5 MG/1
10 TABLET ORAL DAILY
Status: DISCONTINUED | OUTPATIENT
Start: 2025-02-21 | End: 2025-02-26 | Stop reason: HOSPADM

## 2025-02-21 RX ORDER — OLANZAPINE 10 MG/1
10 TABLET ORAL NIGHTLY
Status: ON HOLD | COMMUNITY
End: 2025-02-25 | Stop reason: HOSPADM

## 2025-02-21 RX ORDER — POLYETHYLENE GLYCOL 3350 17 G/17G
17 POWDER, FOR SOLUTION ORAL DAILY PRN
Status: DISCONTINUED | OUTPATIENT
Start: 2025-02-21 | End: 2025-02-26 | Stop reason: HOSPADM

## 2025-02-21 RX ORDER — CARBAMAZEPINE 200 MG/1
200 TABLET, EXTENDED RELEASE ORAL 2 TIMES DAILY
Status: ON HOLD | COMMUNITY
End: 2025-02-25 | Stop reason: HOSPADM

## 2025-02-21 RX ORDER — TRAZODONE HYDROCHLORIDE 50 MG/1
50 TABLET ORAL NIGHTLY PRN
Status: DISCONTINUED | OUTPATIENT
Start: 2025-02-21 | End: 2025-02-26 | Stop reason: HOSPADM

## 2025-02-21 RX ORDER — DIPHENHYDRAMINE HYDROCHLORIDE 50 MG/ML
50 INJECTION INTRAMUSCULAR; INTRAVENOUS EVERY 4 HOURS PRN
Status: DISCONTINUED | OUTPATIENT
Start: 2025-02-21 | End: 2025-02-26 | Stop reason: HOSPADM

## 2025-02-21 RX ORDER — HALOPERIDOL 5 MG/1
5 TABLET ORAL EVERY 4 HOURS PRN
Status: DISCONTINUED | OUTPATIENT
Start: 2025-02-21 | End: 2025-02-26 | Stop reason: HOSPADM

## 2025-02-21 RX ORDER — PANTOPRAZOLE SODIUM 40 MG/1
40 TABLET, DELAYED RELEASE ORAL DAILY
Status: DISCONTINUED | OUTPATIENT
Start: 2025-02-21 | End: 2025-02-26 | Stop reason: HOSPADM

## 2025-02-21 RX ORDER — HALOPERIDOL 5 MG/ML
5 INJECTION INTRAMUSCULAR EVERY 4 HOURS PRN
Status: DISCONTINUED | OUTPATIENT
Start: 2025-02-21 | End: 2025-02-26 | Stop reason: HOSPADM

## 2025-02-21 RX ORDER — HYDROXYZINE HYDROCHLORIDE 50 MG/1
50 TABLET, FILM COATED ORAL 3 TIMES DAILY PRN
Status: DISCONTINUED | OUTPATIENT
Start: 2025-02-21 | End: 2025-02-26 | Stop reason: HOSPADM

## 2025-02-21 RX ORDER — CARBAMAZEPINE 200 MG/1
200 TABLET ORAL 2 TIMES DAILY
Status: DISCONTINUED | OUTPATIENT
Start: 2025-02-21 | End: 2025-02-26 | Stop reason: HOSPADM

## 2025-02-21 RX ORDER — NICOTINE 21 MG/24HR
1 PATCH, TRANSDERMAL 24 HOURS TRANSDERMAL DAILY
Status: DISCONTINUED | OUTPATIENT
Start: 2025-02-21 | End: 2025-02-26 | Stop reason: HOSPADM

## 2025-02-21 RX ADMIN — CARBAMAZEPINE 200 MG: 200 TABLET ORAL at 21:00

## 2025-02-21 RX ADMIN — GABAPENTIN 100 MG: 100 CAPSULE ORAL at 21:00

## 2025-02-21 RX ADMIN — PANTOPRAZOLE SODIUM 40 MG: 40 TABLET, DELAYED RELEASE ORAL at 15:10

## 2025-02-21 RX ADMIN — CETIRIZINE HYDROCHLORIDE 10 MG: 5 TABLET ORAL at 15:09

## 2025-02-21 ASSESSMENT — PATIENT HEALTH QUESTIONNAIRE - PHQ9
SUM OF ALL RESPONSES TO PHQ QUESTIONS 1-9: 2
2. FEELING DOWN, DEPRESSED OR HOPELESS: SEVERAL DAYS
SUM OF ALL RESPONSES TO PHQ9 QUESTIONS 1 & 2: 2
SUM OF ALL RESPONSES TO PHQ QUESTIONS 1-9: 2
1. LITTLE INTEREST OR PLEASURE IN DOING THINGS: SEVERAL DAYS

## 2025-02-21 ASSESSMENT — LIFESTYLE VARIABLES
HOW OFTEN DO YOU HAVE A DRINK CONTAINING ALCOHOL: NEVER
HOW MANY STANDARD DRINKS CONTAINING ALCOHOL DO YOU HAVE ON A TYPICAL DAY: PATIENT DOES NOT DRINK

## 2025-02-21 ASSESSMENT — SLEEP AND FATIGUE QUESTIONNAIRES
DO YOU HAVE DIFFICULTY SLEEPING: NO
DO YOU USE A SLEEP AID: NO
AVERAGE NUMBER OF SLEEP HOURS: 6

## 2025-02-21 NOTE — PROGRESS NOTES
EXAMINATION:        Pt. Appears Poor Eye Contact , Cooperative, Withdrawn/Quiet, and Guarded.      Pt's speech is shows no evidence of impairment.     Pt's mood is anxious and sad.      Pt.'s thinking iscircumstantialPreoccupied.      Pt's associations are Preoccupied.     Pt.Convincingly exhibits  Negative.  suicidal ideation.      Pt. Exhibits Negative,  homicidal ideation      Pt's CSSR-S level is rated low.       Pt's judgement is fair.      Pt. does not exhibit anxiety with  a good(>30min) Attention span.      /71   Pulse 73   Temp 96.9 °F (36.1 °C) (Temporal)   Resp 16   Ht 1.655 m (5' 5.16\")   Wt 53.1 kg (117 lb)   SpO2 97%   BMI 19.38 kg/m²     NURSING INTERVENTIONS:  Nursing to administer medications to Pt, with monitoring and recording of compliance symptoms, and possible side effects as appropriate.        RESPONSE TO MEDICATION: Pt. Is   compliant with Medications.    PT. was engaged. Pt. Was  encouraged to participate in activities Showing  Good participation.      Emotional Support and encouragement were provided to Pt.  Pt's response to this intervention appeared to be effective.       Treatment plans up to date.

## 2025-02-21 NOTE — CONSULTS
Hospitalist Consult Note             Chief Complaints:   Suicide attempt         Subjective:     Kalyn Russo is a 49 y.o. female followed by Rufina Raygoza MD and  has a past medical history of ADHD, Bipolar disease, chronic (HCC), Borderline personality disorder (HCC), Cervical cancer (HCC), COPD (chronic obstructive pulmonary disease) (HCC), Hepatitis C, Liver disease, and Seizure (HCC).    Patient presented to [facility] where she was found to have overdosed initially on  medications clonidine, Neurontin, Seroquel, Elavil.  She could not tell me that she took it is noted that she definitely took clonidine and Neurontin.  Soon after taking overdose it was noted that she texted family member stating that she was sorry and that she was going to make everything better.  When she was unable to be reached over the phone patient's son went to her home and called law enforcement for a welfare check and at that time after the home to find patient unresponsive with pill bottles and pills surrounding her.  Her UDS was positive for cocaine for which she does admit using for the past 20 years.  Patient was evaluated and monitored closely for the overdose and was diagnosed dilated for inpatient placement and was then referred to [facility] for admission      Past Medical History:   Diagnosis Date    ADHD     Bipolar disease, chronic (HCC)     Borderline personality disorder (HCC)     Cervical cancer (HCC)     COPD (chronic obstructive pulmonary disease) (HCC)     Hepatitis C     Liver disease     Hepatitis C: resolved    Seizure (HCC)     last date 6mos to a year ago      Past Surgical History:   Procedure Laterality Date    GYN      C Section x 3    GYN      D & C    GYN       x 3     Family History   Problem Relation Age of Onset    Hypertension Mother     Diabetes Mother     Heart Disease Mother     Cancer Maternal Grandmother         kidney    No Known

## 2025-02-21 NOTE — CARE COORDINATION
02/21/25 1013   ITP   Date of Plan 02/21/25   Date of Next Review 02/28/25   Primary Diagnosis Code Unspecified mood   Barriers to Treatment Need for psychoeducation;Psychiatric symptom (comment)   Strengths Incorporated in Plan Acknowledging need for assistance;Community supports;Family supports;Natural supports;Postive outlook;Seeking interactions   Plan of Care   Long Term Goal (LTG) Stated in patient/guardian terms On PSA   Short Term Goal 1   Short Term Goal 1 Client will learn and demonstrate improved self management skills   Baseline Functioning Unknown   Target TBD   Objectives Client will participate in individual therapy;Client will participate in group therapy   Intervention 1 Acknowledge client strengths   Frequency Daily   Measured by Behavioral data;Self report;Staff observation   Staff Responsible [facility] staff;Clinical staff   Intervention 2 Group therapy   Frequency Daily   Measured by Self report;Staff observation;Behavioral data   Staff Responsible [facility] staff;Clinical staff   Intervention 3 Referral to community services   Frequency weekly   Measured by Behavioral data;Self report;Staff observation   Staff Responsible [facility] staff;Clinical staff   STG Goal 1 Status: Patient Appears to be  Treatment plan goal is unmet at this time   Short Term Goal 2   Short Term Goal 2 Client will maintain compliance with medication regime   Baseline Functioning Unknown   Target TBD   Objectives Client will participate in individual therapy;Client will participate in group therapy   Intervention 1 Monitor medications   Frequency Daily   Measured by Behavioral data;Self report;Staff observation   Staff Responsible [facility] staff   Intervention 2 Indvidual therapy   Frequency Daily   Measured by Behavioral data;Self report;Staff observation   Staff Responsible Clinical staff   STG Goal 2 Status: Patient Appears to be  Treatment plan goal is unmet at this time   Crisis/Safety/Discharge Plan   Crisis/Safety Plan Standard

## 2025-02-21 NOTE — GROUP NOTE
Group Therapy Note    Date: 2/21/2025    Group Start Time: 1045  Group End Time: 1130  Group Topic: Process Group - Inpatient    [facility]    Marine Doherty        Group Therapy Note    Attendees: 3/4    Writer facilitated an inpatient processing group. Writer had patients engage in an expressive arts activity involving paint. Writer encouraged patients to express feelings, discuss discharge plans, etc. Writer held the space as patients processed. Writer concluded session with a grounding exercise.        Pt invited and encouraged to attend group but chose not to attend.      Signature:  Marine Doherty

## 2025-02-21 NOTE — GROUP NOTE
Group Therapy Note    Date: 2/21/2025    Group Start Time: 1130  Group End Time: 1215  Group Topic: Psychoeducation    [facility]    Marine Doherty        Group Therapy Note    Attendees: 3/4    Writer facilitated an inpatient psych-ed group. Writer reviewed coping skills and grounding exercises taught throughout the week. Writer held the space as patients reviewed. Writer concluded session with a grounding exercise and wrap-up discussion.        Pt invited and encouraged to attend group but chose not to attend.         Signature:  Marine Doherty

## 2025-02-21 NOTE — PROGRESS NOTES
T/C from ER Registration, that our pt was here but noted with 3 IV sites. This writer notified the House Supervisor and explained what was going on and that this writer ws going up front to remove the IV's before pt came to the unit. Explained that the Nurse Kory was suppose to call prior to leaving the facility. One of the transport guys said he told the staff there that the IV's needed to be removed.

## 2025-02-22 LAB
CHOLEST SERPL-MCNC: 183 MG/DL
EST. AVERAGE GLUCOSE BLD GHB EST-MCNC: 97 MG/DL
HBA1C MFR BLD: 5 % (ref 4–5.6)
HDLC SERPL-MCNC: 40 MG/DL
HDLC SERPL: 4.6 (ref 0–5)
LDLC SERPL CALC-MCNC: 114.2 MG/DL (ref 0–100)
LIPID PANEL: ABNORMAL
TRIGL SERPL-MCNC: 144 MG/DL
VLDLC SERPL CALC-MCNC: 28.8 MG/DL

## 2025-02-22 PROCEDURE — 1240000000 HC EMOTIONAL WELLNESS R&B

## 2025-02-22 PROCEDURE — 6370000000 HC RX 637 (ALT 250 FOR IP): Performed by: HOSPITALIST

## 2025-02-22 PROCEDURE — 6370000000 HC RX 637 (ALT 250 FOR IP): Performed by: PSYCHIATRY & NEUROLOGY

## 2025-02-22 RX ADMIN — CETIRIZINE HYDROCHLORIDE 10 MG: 5 TABLET ORAL at 08:44

## 2025-02-22 RX ADMIN — GABAPENTIN 100 MG: 100 CAPSULE ORAL at 20:56

## 2025-02-22 RX ADMIN — TRAZODONE HYDROCHLORIDE 50 MG: 50 TABLET ORAL at 20:56

## 2025-02-22 RX ADMIN — PANTOPRAZOLE SODIUM 40 MG: 40 TABLET, DELAYED RELEASE ORAL at 08:44

## 2025-02-22 RX ADMIN — CARBAMAZEPINE 200 MG: 200 TABLET ORAL at 08:44

## 2025-02-22 RX ADMIN — CARBAMAZEPINE 200 MG: 200 TABLET ORAL at 20:56

## 2025-02-22 RX ADMIN — GABAPENTIN 100 MG: 100 CAPSULE ORAL at 08:44

## 2025-02-22 NOTE — GROUP NOTE
Group Therapy Note    Date: 2/21/2025    Group Start Time: 2000  Group End Time: 2045  Group Topic: Wrap-Up    [facility]    Eddie Encarnacion        Group Therapy Note    Attendees: 4         Patient's Goal:  n/a    Notes:  happy    Status After Intervention:  Improved    Participation Level: Active Listener    Participation Quality: Supportive      Speech:  normal      Thought Process/Content: Logical      Affective Functioning: Congruent      Mood:  happy      Level of consciousness:  Alert      Response to Learning: Able to verbalize current knowledge/experience      Endings: None Reported    Modes of Intervention: Socialization      Discipline Responsible: Behavorial Health Tech      Signature:  Eddie Encarnacion

## 2025-02-22 NOTE — PLAN OF CARE
Problem: Discharge Planning  Goal: Discharge to home or other facility with appropriate resources  2/21/2025 2228 by Delores Goel RN  Outcome: Progressing  2/21/2025 1115 by Alta Valdze LPN  Outcome: Progressing     Problem: Self Harm/Suicidality  Goal: Will have no self-injury during hospital stay  Description: INTERVENTIONS:  1.  Ensure constant observer at bedside with Q15M safety checks  2.  Maintain a safe environment  3.  Secure patient belongings  4.  Ensure family/visitors adhere to safety recommendations  5.  Ensure safety tray has been added to patient's diet order  6.  Every shift and PRN: Re-assess suicidal risk via Frequent Screener    2/21/2025 2228 by Delores Goel RN  Outcome: Progressing  2/21/2025 1115 by Alta Valdez LPN  Outcome: Progressing     Problem: Neurosensory - Adult  Goal: Absence of seizures  2/21/2025 2228 by Delores Goel RN  Outcome: Progressing  2/21/2025 1115 by Alta Valdez LPN  Outcome: Progressing     Problem: Respiratory - Adult  Goal: Achieves optimal ventilation and oxygenation  2/21/2025 2228 by Delores Goel RN  Outcome: Progressing  2/21/2025 1115 by Alta Valdez LPN  Outcome: Progressing     Problem: Safety - Adult  Goal: Free from fall injury  2/21/2025 2228 by Delores Goel RN  Outcome: Progressing  2/21/2025 1115 by Alta Valdez LPN  Outcome: Progressing     Problem: Depression  Goal: Will be euthymic at discharge  Description: INTERVENTIONS:  1. Administer medication as ordered  2. Provide emotional support via 1:1 interaction with staff  3. Encourage involvement in milieu/groups/activities  4. Monitor for social isolation  2/21/2025 2228 by Delores Goel RN  Outcome: Progressing  2/21/2025 1115 by Alta Valdez LPN  Outcome: Progressing     Problem: Anxiety  Goal: Will report anxiety at manageable levels  Description: INTERVENTIONS:  1. Administer medication as ordered  2. Teach and rehearse

## 2025-02-22 NOTE — H&P
INITIAL PSYCHIATRIC EVALUATION            IDENTIFICATION:    Patient Name  Kalyn Russo   Date of Birth 1975   [facility] [id number]   Medical Record Number  [id number]      Age  49 y.o.   PCP Rufina Raygoza MD   Admit date:  2/20/2025    Room Number  105/01  @ [facility]   Date of Service  2/21/2025            HISTORY         REASON FOR HOSPITALIZATION:  CC: \"Overdose.\"   HISTORY OF PRESENT ILLNESS:    The patient, Kalyn Russo, is a 49 y.o.  White (non-) female with a past psychiatric history significant for bipolar disorder, stimulant/cocaine use disorder and borderline personality disorder admitted to [facility] following suicide and overdose on medication.  Patient was initially admitted to an outside medical hospital following overdose on medication.  She was transferred to [facility] behavioral health after she was medically cleared.  She reported taking an unknown amount of clonidine and other medications.  She reported having suicidal ideations for the last few months.  She reported completing an 80-day rehab in December 2024.  She reported having relapse on cocaine in January 2025.  She reported using cocaine daily for the last 2 months.  She last used medical to the hospital.  She reported smoking it.  She denied any other drug use.  She reported feeling depressed most of the time.  She also reported poor sleep, low appetite and energy loss.  Feeling hopeless sometimes.  Denied any anhedonia.  She reported mild episodes of elevated energy, racing thoughts, decreased need for sleep and increased goal-directed activity that last for a few days to a few weeks.  She last had these symptoms few weeks ago.  She reported having these symptoms even when she was not using cocaine.  Denied any paranoid thoughts, troubles or hallucinations.  Patient with trauma history but denied any symptoms related to PTSD.  Not taking her

## 2025-02-23 PROCEDURE — 6370000000 HC RX 637 (ALT 250 FOR IP): Performed by: PSYCHIATRY & NEUROLOGY

## 2025-02-23 PROCEDURE — 6370000000 HC RX 637 (ALT 250 FOR IP)

## 2025-02-23 PROCEDURE — 1240000000 HC EMOTIONAL WELLNESS R&B

## 2025-02-23 PROCEDURE — 6370000000 HC RX 637 (ALT 250 FOR IP): Performed by: HOSPITALIST

## 2025-02-23 RX ADMIN — GABAPENTIN 100 MG: 100 CAPSULE ORAL at 08:29

## 2025-02-23 RX ADMIN — CETIRIZINE HYDROCHLORIDE 10 MG: 5 TABLET ORAL at 08:28

## 2025-02-23 RX ADMIN — PANTOPRAZOLE SODIUM 40 MG: 40 TABLET, DELAYED RELEASE ORAL at 08:28

## 2025-02-23 RX ADMIN — HYDROXYZINE HYDROCHLORIDE 50 MG: 50 TABLET ORAL at 00:42

## 2025-02-23 RX ADMIN — GABAPENTIN 100 MG: 100 CAPSULE ORAL at 20:21

## 2025-02-23 RX ADMIN — CARBAMAZEPINE 200 MG: 200 TABLET ORAL at 08:30

## 2025-02-23 RX ADMIN — POLYETHYLENE GLYCOL 3350 17 G: 17 POWDER, FOR SOLUTION ORAL at 14:13

## 2025-02-23 RX ADMIN — AMITRIPTYLINE HYDROCHLORIDE 25 MG: 25 TABLET, FILM COATED ORAL at 20:21

## 2025-02-23 RX ADMIN — CARBAMAZEPINE 200 MG: 200 TABLET ORAL at 20:21

## 2025-02-23 NOTE — GROUP NOTE
Group Therapy Note    Date: 2/23/2025    Group Start Time: 1600  Group End Time: 1645  Group Topic: Nursing    [facility]    Mara Muñiz, RN; Ada Ortiz, RADHA    Writer facilitated a group on discharge goals. Patient where asked to give two discharge goals.    Group Therapy Note    Attendees: 4       Patient's Goal:  Move in with daughter and pack up old house.    Notes:  Havey my son, daughter and mon pack up everything.    Status After Intervention:  Improved    Participation Level: Interactive    Participation Quality: Appropriate and Attentive      Speech:  normal      Thought Process/Content: Logical      Affective Functioning: Congruent      Mood: euthymic      Level of consciousness:  Oriented x4      Response to Learning: Able to verbalize current knowledge/experience      Endings: None Reported    Modes of Intervention: Education, Support, Socialization, Exploration, and Clarifying      Discipline Responsible: Registered Nurse      Signature:  Mara Muñiz RN

## 2025-02-23 NOTE — PLAN OF CARE
Problem: Discharge Planning  Goal: Discharge to home or other facility with appropriate resources  Outcome: Progressing     Problem: Self Harm/Suicidality  Goal: Will have no self-injury during hospital stay  Description: INTERVENTIONS:  1.  Ensure constant observer at bedside with Q15M safety checks  2.  Maintain a safe environment  3.  Secure patient belongings  4.  Ensure family/visitors adhere to safety recommendations  5.  Ensure safety tray has been added to patient's diet order  6.  Every shift and PRN: Re-assess suicidal risk via Frequent Screener    2/22/2025 2236 by Delores Goel RN  Outcome: Progressing  2/22/2025 1356 by Mara Muñiz RN  Outcome: Progressing     Problem: Neurosensory - Adult  Goal: Absence of seizures  2/22/2025 2236 by Delores Goel RN  Outcome: Progressing  2/22/2025 1356 by Mara Muñiz RN  Outcome: Progressing     Problem: Respiratory - Adult  Goal: Achieves optimal ventilation and oxygenation  2/22/2025 2236 by Delores Goel RN  Outcome: Progressing  2/22/2025 1356 by Mara Muñiz RN  Outcome: Progressing     Problem: Safety - Adult  Goal: Free from fall injury  2/22/2025 2236 by Delores Goel RN  Outcome: Progressing  2/22/2025 1356 by Mara Muñiz RN  Outcome: Progressing     Problem: Depression  Goal: Will be euthymic at discharge  Description: INTERVENTIONS:  1. Administer medication as ordered  2. Provide emotional support via 1:1 interaction with staff  3. Encourage involvement in milieu/groups/activities  4. Monitor for social isolation  Outcome: Progressing     Problem: Anxiety  Goal: Will report anxiety at manageable levels  Description: INTERVENTIONS:  1. Administer medication as ordered  2. Teach and rehearse alternative coping skills  3. Provide emotional support with 1:1 interaction with staff  Outcome: Progressing     Problem: Self Care Deficit  Goal: Return ADL status to a safe level of function  Description:

## 2025-02-24 PROCEDURE — 6370000000 HC RX 637 (ALT 250 FOR IP): Performed by: PSYCHIATRY & NEUROLOGY

## 2025-02-24 PROCEDURE — 1240000000 HC EMOTIONAL WELLNESS R&B

## 2025-02-24 PROCEDURE — 6370000000 HC RX 637 (ALT 250 FOR IP): Performed by: HOSPITALIST

## 2025-02-24 PROCEDURE — 6370000000 HC RX 637 (ALT 250 FOR IP)

## 2025-02-24 RX ADMIN — GABAPENTIN 100 MG: 100 CAPSULE ORAL at 08:45

## 2025-02-24 RX ADMIN — CARBAMAZEPINE 200 MG: 200 TABLET ORAL at 20:51

## 2025-02-24 RX ADMIN — AMITRIPTYLINE HYDROCHLORIDE 25 MG: 25 TABLET, FILM COATED ORAL at 20:51

## 2025-02-24 RX ADMIN — CARBAMAZEPINE 200 MG: 200 TABLET ORAL at 08:45

## 2025-02-24 RX ADMIN — POLYETHYLENE GLYCOL 3350 17 G: 17 POWDER, FOR SOLUTION ORAL at 17:27

## 2025-02-24 RX ADMIN — PANTOPRAZOLE SODIUM 40 MG: 40 TABLET, DELAYED RELEASE ORAL at 08:45

## 2025-02-24 RX ADMIN — GABAPENTIN 100 MG: 100 CAPSULE ORAL at 20:51

## 2025-02-24 RX ADMIN — CETIRIZINE HYDROCHLORIDE 10 MG: 5 TABLET ORAL at 08:45

## 2025-02-24 NOTE — PROGRESS NOTES
Psychiatric Progress Note      Patient: Kalyn Russo MRN: [id number]  SSN: [id number]    YOB: 1975  Age: 49 y.o.  Sex: female      Admit Date: 2/20/2025       Subjective:     Kalyn Russo states that she continues to have depressed and anxious mood.  Reported some improved sleep.  Denied any suicidal or homicidal ideations.  Attending groups and work on coping skills.  Denied any withdrawal symptoms.  Motivated go to rehab.  Denied any side effects of medications.      Objective:     Vitals:    02/22/25 0606 02/22/25 1445 02/23/25 1500 02/24/25 0601   BP: 112/61 (!) 91/55 99/66 (!) 93/56   Pulse: 76 85 74 72   Resp: 16 16 18 16   Temp: 98.2 °F (36.8 °C) 97.5 °F (36.4 °C) 97.5 °F (36.4 °C) 96.9 °F (36.1 °C)   TempSrc: Temporal Temporal Temporal Temporal   SpO2: 98% 97% 99% 99%   Weight:       Height:            Mental Status Exam:     Appearance-fairly groomed  Alert, oriented x4  Speech -normal rate, volume and rhythm  Mood-depressed  Affect-constricted  Thought process-linear and goal directed  Thought content-no delusions   Thought perception-no auditory or visual hallucinations   Suicidal ideations   Insight limited  Judgement Limited    MEDICATIONS:  Current Facility-Administered Medications   Medication Dose Route Frequency    amitriptyline (ELAVIL) tablet 25 mg  25 mg Oral Nightly    polyethylene glycol (GLYCOLAX) packet 17 g  17 g Oral Daily PRN    hydrOXYzine HCl (ATARAX) tablet 50 mg  50 mg Oral TID PRN    haloperidol (HALDOL) tablet 5 mg  5 mg Oral Q4H PRN    Or    haloperidol lactate (HALDOL) injection 5 mg  5 mg IntraMUSCular Q4H PRN    diphenhydrAMINE (BENADRYL) injection 50 mg  50 mg IntraMUSCular Q4H PRN    traZODone (DESYREL) tablet 50 mg  50 mg Oral Nightly PRN    nicotine (NICODERM CQ) 14 MG/24HR 1 patch  1 patch TransDERmal Daily    carBAMazepine (TEGRETOL) tablet 200 mg  200 mg Oral BID    pantoprazole (PROTONIX) tablet 40 mg  40 mg Oral Daily    cetirizine (ZYRTEC) tablet

## 2025-02-24 NOTE — PLAN OF CARE
Problem: Discharge Planning  Goal: Discharge to home or other facility with appropriate resources  Outcome: Progressing     Problem: Self Harm/Suicidality  Goal: Will have no self-injury during hospital stay  Description: INTERVENTIONS:  1.  Ensure constant observer at bedside with Q15M safety checks  2.  Maintain a safe environment  3.  Secure patient belongings  4.  Ensure family/visitors adhere to safety recommendations  5.  Ensure safety tray has been added to patient's diet order  6.  Every shift and PRN: Re-assess suicidal risk via Frequent Screener    2/23/2025 2215 by Delores Goel RN  Outcome: Progressing  2/23/2025 1459 by Mara Muñiz RN  Outcome: Progressing     Problem: Neurosensory - Adult  Goal: Absence of seizures  Outcome: Progressing     Problem: Respiratory - Adult  Goal: Achieves optimal ventilation and oxygenation  2/23/2025 1459 by Mara Muñiz RN  Outcome: Progressing     Problem: Safety - Adult  Goal: Free from fall injury  2/23/2025 2215 by Delores Goel RN  Outcome: Progressing  2/23/2025 1459 by Mara Muñiz RN  Outcome: Progressing     Problem: Depression  Goal: Will be euthymic at discharge  Description: INTERVENTIONS:  1. Administer medication as ordered  2. Provide emotional support via 1:1 interaction with staff  3. Encourage involvement in milieu/groups/activities  4. Monitor for social isolation  Outcome: Progressing     Problem: Anxiety  Goal: Will report anxiety at manageable levels  Description: INTERVENTIONS:  1. Administer medication as ordered  2. Teach and rehearse alternative coping skills  3. Provide emotional support with 1:1 interaction with staff  2/23/2025 2215 by Delores Goel RN  Outcome: Progressing  2/23/2025 1459 by Mara Muñiz RN  Outcome: Progressing     Problem: Self Care Deficit  Goal: Return ADL status to a safe level of function  Description: INTERVENTIONS:  1. Administer medication as ordered  2. Assess ADL

## 2025-02-24 NOTE — GROUP NOTE
Group Therapy Note    Date: 2/24/2025    Group Start Time: 1500  Group End Time: 1535  Group Topic: Psychoeducation    [facility]    Marine Doherty        Group Therapy Note    Attendees: 4/5    Writer facilitated an inpatient psych-ed group. Writer provided a handout which discussed a variety of positive character qualities such as \"insightful, etc.\" Writer encouraged patients to reflect and discuss various ways to bring out these qualities. Writer held the space as patients processed. Writer concluded session with a grounding exercise and encouraging patients to provide input and feedback regarding the group.        Pt invited and encouraged to attend group but chose not to attend.       Signature:  Marine Doherty

## 2025-02-24 NOTE — CARE COORDINATION
02/24/25 1019   Short Term Goal 1   STG Goal 1 Status: Patient Appears to be  Progressing toward treatment plan goal   Short Term Goal 2   STG Goal 2 Status: Patient Appears to be  Progressing toward treatment plan goal   Crisis/Safety/Discharge Plan   Discharge Plan Inpatient rehab

## 2025-02-24 NOTE — GROUP NOTE
Group Therapy Note    Date: 2/23/2025    Group Start Time: 2000  Group End Time: 2030  Group Topic: Wrap-Up    [facility]    Eddie Encarnacion        Group Therapy Note    Attendees: 4         Patient's Goal:  n/a    Notes:  happy    Status After Intervention:  Improved    Participation Level: Active Listener    Participation Quality: Supportive      Speech:  normal      Thought Process/Content: Logical      Affective Functioning: Congruent      Mood:  happy      Level of consciousness:  Alert      Response to Learning: Able to verbalize current knowledge/experience      Endings: None Reported    Modes of Intervention: Socialization      Discipline Responsible: Behavorial Health Tech      Signature:  Eddie Encarnacion

## 2025-02-24 NOTE — GROUP NOTE
Group Therapy Note    Date: 2/24/2025    Group Start Time: 1415  Group End Time: 1500  Group Topic: Process Group - Inpatient    [facility]    Marine Doherty        Group Therapy Note    Attendees: 5/5    Writer facilitated an inpatient processing group. Writer had patients engage in an expressive/reflective activity in which they were asked to write a letter to their future selves. Writer encouraged patients to express feelings, discuss discharge plans, etc. Writer held the space as patients processed. Writer concluded session with a grounding exercise.        Patient's Goal:  To attend and participate in groups and activities daily.    Notes:  Pt attended some of group and engaged in writing the letter but asked to lay down due to not feeling well.    Status After Intervention:  Improved    Participation Level: Active Listener and Interactive    Participation Quality: Appropriate, Attentive, and Sharing      Speech:  normal      Thought Process/Content: Logical  Linear      Affective Functioning: Congruent      Mood: euthymic      Level of consciousness:  Alert, Oriented x4, and Attentive      Response to Learning: Able to retain information, Capable of insight, and Progressing to goal      Endings: None Reported    Modes of Intervention: Exploration and Activity      Discipline Responsible: /Counselor      Signature:  Marine Doherty LPC

## 2025-02-25 PROCEDURE — 6370000000 HC RX 637 (ALT 250 FOR IP): Performed by: HOSPITALIST

## 2025-02-25 PROCEDURE — 6370000000 HC RX 637 (ALT 250 FOR IP)

## 2025-02-25 PROCEDURE — 1240000000 HC EMOTIONAL WELLNESS R&B

## 2025-02-25 PROCEDURE — 6370000000 HC RX 637 (ALT 250 FOR IP): Performed by: PSYCHIATRY & NEUROLOGY

## 2025-02-25 RX ORDER — CARBAMAZEPINE 200 MG/1
200 TABLET ORAL 2 TIMES DAILY
Qty: 60 TABLET | Refills: 0 | Status: SHIPPED | OUTPATIENT
Start: 2025-02-26 | End: 2025-03-28

## 2025-02-25 RX ORDER — PANTOPRAZOLE SODIUM 40 MG/1
40 TABLET, DELAYED RELEASE ORAL DAILY
Qty: 30 TABLET | Refills: 0 | Status: SHIPPED | OUTPATIENT
Start: 2025-02-25 | End: 2025-03-27

## 2025-02-25 RX ORDER — CETIRIZINE HYDROCHLORIDE 10 MG/1
10 TABLET ORAL DAILY
Qty: 14 TABLET | Refills: 0 | Status: SHIPPED | OUTPATIENT
Start: 2025-02-26 | End: 2025-03-12

## 2025-02-25 RX ORDER — HYDROXYZINE HYDROCHLORIDE 50 MG/1
50 TABLET, FILM COATED ORAL 3 TIMES DAILY PRN
Qty: 30 TABLET | Refills: 0 | Status: SHIPPED | OUTPATIENT
Start: 2025-02-25 | End: 2025-03-07

## 2025-02-25 RX ORDER — GABAPENTIN 100 MG/1
100 CAPSULE ORAL 2 TIMES DAILY
Qty: 28 CAPSULE | Refills: 0 | Status: SHIPPED | OUTPATIENT
Start: 2025-02-26 | End: 2025-03-12

## 2025-02-25 RX ORDER — TRAZODONE HYDROCHLORIDE 50 MG/1
50 TABLET ORAL NIGHTLY PRN
Qty: 30 TABLET | Refills: 0 | Status: SHIPPED | OUTPATIENT
Start: 2025-02-25 | End: 2025-03-27

## 2025-02-25 RX ADMIN — TRAZODONE HYDROCHLORIDE 50 MG: 50 TABLET ORAL at 19:52

## 2025-02-25 RX ADMIN — GABAPENTIN 100 MG: 100 CAPSULE ORAL at 19:52

## 2025-02-25 RX ADMIN — CARBAMAZEPINE 200 MG: 200 TABLET ORAL at 19:52

## 2025-02-25 RX ADMIN — HYDROXYZINE HYDROCHLORIDE 50 MG: 50 TABLET ORAL at 19:52

## 2025-02-25 RX ADMIN — AMITRIPTYLINE HYDROCHLORIDE 25 MG: 25 TABLET, FILM COATED ORAL at 19:52

## 2025-02-25 RX ADMIN — PANTOPRAZOLE SODIUM 40 MG: 40 TABLET, DELAYED RELEASE ORAL at 08:38

## 2025-02-25 RX ADMIN — CARBAMAZEPINE 200 MG: 200 TABLET ORAL at 08:38

## 2025-02-25 RX ADMIN — GABAPENTIN 100 MG: 100 CAPSULE ORAL at 08:38

## 2025-02-25 RX ADMIN — MAGNESIUM HYDROXIDE 30 ML: 2400 SUSPENSION ORAL at 17:29

## 2025-02-25 RX ADMIN — CETIRIZINE HYDROCHLORIDE 10 MG: 5 TABLET ORAL at 08:38

## 2025-02-25 NOTE — GROUP NOTE
Group Therapy Note    Date: 2/24/2025    Group Start Time: 2000  Group End Time: 2045  Group Topic: Wrap-Up    [facility]    Edel Talavera CNA        Group Therapy Note    Attendees: 4       Patient's Goal:  none    Notes:  participated in group a little    Status After Intervention:  Improved    Participation Level: Minimal    Participation Quality: Attentive and Sharing      Speech:  normal      Thought Process/Content: Logical      Affective Functioning: Congruent      Mood: anxious and depressed      Level of consciousness:  Alert      Response to Learning: Able to verbalize current knowledge/experience, Able to verbalize/acknowledge new learning, and Able to retain information      Endings: None Reported    Modes of Intervention: Activity      Discipline Responsible: Behavorial Health Tech      Signature:  Edel Talavera CNA

## 2025-02-25 NOTE — GROUP NOTE
Group Therapy Note    Date: 2/25/2025    Group Start Time: 1415  Group End Time: 1500  Group Topic: Psychoeducation    [facility]    Marine Doherty        Group Therapy Note    Attendees: 2/4    Writer facilitated an inpatient psych-ed group. Writer provided a handout regarding Trauma and the various symptoms. Writer encouraged patients to process and ask questions. Writer concluded session with a grounding exercise.        Pt invited and encouraged to attend group but chose not to attend.         Signature:  Marine Doherty

## 2025-02-25 NOTE — GROUP NOTE
Group Therapy Note    Date: 2/25/2025    Group Start Time: 1330  Group End Time: 1415  Group Topic: Process Group - Inpatient    [facility]    Marine Doherty        Group Therapy Note    Attendees: 2/4    Writer facilitated an inpatient processing group. Writer had patients engage in an expressive activity worksheet in which they were asked to challenge and reframe maladaptive thoughts. Writer encouraged patients to discuss discharge plans, express feelings, etc. Writer held the space as patients processed. Writer concluded session with a grounding exercise and encouraging patients to provide input and feedback.        Pt invited and encouraged to attend group but chose not to attend.         Signature:  Marine Doherty

## 2025-02-26 VITALS
WEIGHT: 117 LBS | TEMPERATURE: 97.3 F | SYSTOLIC BLOOD PRESSURE: 92 MMHG | HEART RATE: 90 BPM | OXYGEN SATURATION: 97 % | BODY MASS INDEX: 19.49 KG/M2 | HEIGHT: 65 IN | DIASTOLIC BLOOD PRESSURE: 61 MMHG | RESPIRATION RATE: 16 BRPM

## 2025-02-26 PROCEDURE — 6370000000 HC RX 637 (ALT 250 FOR IP): Performed by: HOSPITALIST

## 2025-02-26 PROCEDURE — 6370000000 HC RX 637 (ALT 250 FOR IP): Performed by: PSYCHIATRY & NEUROLOGY

## 2025-02-26 RX ADMIN — CARBAMAZEPINE 200 MG: 200 TABLET ORAL at 07:22

## 2025-02-26 RX ADMIN — PANTOPRAZOLE SODIUM 40 MG: 40 TABLET, DELAYED RELEASE ORAL at 07:21

## 2025-02-26 RX ADMIN — GABAPENTIN 100 MG: 100 CAPSULE ORAL at 07:21

## 2025-02-26 RX ADMIN — CETIRIZINE HYDROCHLORIDE 10 MG: 5 TABLET ORAL at 07:21

## 2025-02-26 NOTE — PROGRESS NOTES
EXAMINATION:        Pt. Appears Neatly Groomed, Attentive, Cooperative, Motivated, and Enthusiastic.      Pt's speech is shows no evidence of impairment.     Pt's mood is euthymic.      Pt.'s thinking is unremarkableOrganized and Goal Directed.      Pt's associations are Organized and Goal Directed.     Pt.Convincingly exhibits  Negative.  suicidal ideation.      Pt. Exhibits Negative,  homicidal ideation      Pt's CSSR-S level is rated low.       Pt's judgement is good.      Pt. does not exhibit anxiety with  a good(>30min) Attention span.    Pt. A little anxious about going to Rehab today, but she knows It is for the best for her.  Pt. Stated she slept well last night after nurse gave her medications to help from the anxiety.      BP 92/61   Pulse 90   Temp 97.3 °F (36.3 °C) (Temporal)   Resp 16   Ht 1.655 m (5' 5.16\")   Wt 53.1 kg (117 lb)   SpO2 97%   BMI 19.38 kg/m²     NURSING INTERVENTIONS:  Nursing to administer medications to Pt, with monitoring and recording of compliance symptoms, and possible side effects as appropriate.        RESPONSE TO MEDICATION: Pt. Is   compliant with Medications.    PT. was engaged. Pt. Was  encouraged to participate in activities Showing  Good participation.      Emotional Support and encouragement were provided to Pt.  Pt's response to this intervention appeared to be effective.       Treatment plans up to date.

## 2025-02-26 NOTE — GROUP NOTE
Group Therapy Note    Date: 2/25/2025    Group Start Time: 2000  Group End Time: 2045  Group Topic: Wrap-Up    [facility]    Edel Talavera CNA        Group Therapy Note    Attendees: 4       Patient's Goal:  Looking forward to going to rehab tomorrow and also say she having anxiety    Notes:  was in group but really no participation    Status After Intervention:  Improved    Participation Level: Minimal    Participation Quality: Appropriate      Speech:  pressured      Thought Process/Content: Logical      Affective Functioning: Flat      Mood: depressed      Level of consciousness:  Alert      Response to Learning: Able to verbalize current knowledge/experience, Able to verbalize/acknowledge new learning, Able to retain information, Capable of insight, Able to change behavior, and Progressing to goal      Endings: None Reported    Modes of Intervention: Activity      Discipline Responsible: Behavorial Health Tech      Signature:  Edel Talavera CNA

## 2025-02-26 NOTE — PROGRESS NOTES
Psychiatric Progress Note      Patient: Kalyn Russo MRN: [id number]  SSN: [id number]    YOB: 1975  Age: 49 y.o.  Sex: female      Admit Date: 2/20/2025       Subjective:     Kalyn Russo stated she is feeling better with her mood.  Feeling less depressed and anxious.  Denied any suicidal or homicidal ideations.  Denied any withdrawal symptoms or cravings.  Denied any side effects of medications.  Reported good sleep and appetite.  Motivated go to rehab.    Objective:     Vitals:    02/24/25 0601 02/24/25 1630 02/25/25 0557 02/25/25 1530   BP: (!) 93/56 94/66 97/62 (!) 90/55   Pulse: 72 93 77 (!) 101   Resp: 16 18 16 16   Temp: 96.9 °F (36.1 °C) 97.5 °F (36.4 °C) 97.1 °F (36.2 °C) 97.7 °F (36.5 °C)   TempSrc: Temporal Temporal Temporal Temporal   SpO2: 99% 98% 98% 99%   Weight:       Height:            Mental Status Exam:     Appearance-fairly groomed  Alert, oriented x4  Speech -normal rate, volume and rhythm  Mood-depressed-improving  Affect-constricted  Thought process-linear and goal directed  Thought content-no delusions   Thought perception-no auditory or visual hallucinations   No suicidal ideations   Insight fair  Judgement fair    MEDICATIONS:  Current Facility-Administered Medications   Medication Dose Route Frequency    magnesium hydroxide (MILK OF MAGNESIA) 400 MG/5ML suspension 30 mL  30 mL Oral Daily PRN    amitriptyline (ELAVIL) tablet 25 mg  25 mg Oral Nightly    polyethylene glycol (GLYCOLAX) packet 17 g  17 g Oral Daily PRN    hydrOXYzine HCl (ATARAX) tablet 50 mg  50 mg Oral TID PRN    haloperidol (HALDOL) tablet 5 mg  5 mg Oral Q4H PRN    Or    haloperidol lactate (HALDOL) injection 5 mg  5 mg IntraMUSCular Q4H PRN    diphenhydrAMINE (BENADRYL) injection 50 mg  50 mg IntraMUSCular Q4H PRN    traZODone (DESYREL) tablet 50 mg  50 mg Oral Nightly PRN    nicotine (NICODERM CQ) 14 MG/24HR 1 patch  1 patch TransDERmal Daily    carBAMazepine (TEGRETOL) tablet 200 mg  200 mg Oral

## 2025-02-26 NOTE — DISCHARGE SUMMARY
much to take  reasons to take this            CONTINUE taking these medications      pantoprazole 40 MG tablet  Commonly known as: PROTONIX  Take 1 tablet by mouth daily            STOP taking these medications      carBAMazepine 200 MG extended release tablet  Commonly known as: TEGRETOL XR  Replaced by: carBAMazepine 200 MG tablet     lidocaine 4 % external patch     OLANZapine 10 MG tablet  Commonly known as: ZYPREXA     vitamin D3 125 MCG (5000 UT) Tabs tablet  Commonly known as: CHOLECALCIFEROL               Where to Get Your Medications        These medications were [place] [facility] [place] [address] - P [phone] - F [phone] [address] [place] [address]      Phone: [phone]   amitriptyline 25 MG tablet  carBAMazepine 200 MG tablet  cetirizine 10 MG tablet  gabapentin 100 MG capsule  hydrOXYzine HCl 50 MG tablet  pantoprazole 40 MG tablet  traZODone 50 MG tablet                A coordinated, multidisplinary treatment team meeting was conducted with Kalyn Russo---here at [facility]. This team consists of the nurse and .         Signed:  Colby Palomino MD Psychiatry

## 2025-02-26 NOTE — PROGRESS NOTES
EXAMINATION:        Pt. Appears Neatly Groomed and Cooperative.      Pt's speech is is soft.     Pt's mood is labile and within normal limits.      Pt.'s thinking iscircumstantialOrganized and Goal Directed.      Pt's associations are Organized and Goal Directed.     Pt.Convincingly exhibits  Negative.  suicidal ideation and homicidal ideation.      Pt. Exhibits Negative,  suicidal ideation and homicidal ideation      Pt's CSSR-S level is rated low.       Pt's judgement is age appropriate and good.      Pt. does not exhibit anxiety with  a good(>30min) Attention span.      BP (!) 90/55   Pulse (!) 101   Temp 97.7 °F (36.5 °C) (Temporal)   Resp 16   Ht 1.655 m (5' 5.16\")   Wt 53.1 kg (117 lb)   SpO2 99%   BMI 19.38 kg/m²     NURSING INTERVENTIONS:  Nursing to administer medications to Pt, with monitoring and recording of compliance symptoms, and possible side effects as appropriate.        RESPONSE TO MEDICATION: Pt. Is   compliant with Medications.    PT. was not engaged. Pt. Was  encouraged to participate in activities Showing  Good participation.      Emotional Support and encouragement were provided to Pt.  Pt's response to this intervention appeared to be effective.       Treatment plans up to date.

## 2025-02-26 NOTE — PLAN OF CARE
Problem: Neurosensory - Adult  Goal: Absence of seizures  2/25/2025 2102 by Linnea Rosenthal RN  Outcome: Progressing  2/25/2025 1631 by Mara Muñiz RN  Outcome: Progressing     Problem: Respiratory - Adult  Goal: Achieves optimal ventilation and oxygenation  2/25/2025 2102 by Linnea Rosenthal RN  Outcome: Progressing  2/25/2025 1631 by Mara Muñiz RN  Outcome: Progressing     Problem: Safety - Adult  Goal: Free from fall injury  2/25/2025 2102 by Linnea Rosenthal RN  Outcome: Progressing  2/25/2025 1631 by Mara Muñiz RN  Outcome: Progressing     Problem: Depression  Goal: Will be euthymic at discharge  Description: INTERVENTIONS:  1. Administer medication as ordered  2. Provide emotional support via 1:1 interaction with staff  3. Encourage involvement in milieu/groups/activities  4. Monitor for social isolation  2/25/2025 2102 by Linnea Rosenthal RN  Outcome: Progressing  2/25/2025 1631 by Mara Muñiz RN  Outcome: Progressing

## 2025-02-27 NOTE — BH NOTE
ADMISSION NOTE    Pt. Arrived on the unit at approx: 0015, escorted by Security and  Transportation  Via stretcher.      From [facility] Surgical Floor.       Pt.  Noted disheveled, irritable and labile, Very blunt.  Arrived with no shoes or coat. Pt found unresponsive in her home with pill bottle beside her after family received a text from pt and family called 911 for welfare check. Pt has history of multiple suicide attempts. Pt positive for Cocaine, which she uses daily. Just got out of a 80 day Rehab Program and went right back to using. Pt requesting help again. Report received that pt was admitted to ICU on 2/18 for intentional OD .      Admission Type:   Admission Type: Voluntary    Reason for admission:   Reason for Admission: Tried to kill myself but no longer feel Suicidal      Addictive Behavior:   Addictive Behavior  In the Past 3 Months, Have You Felt or Has Someone Told You That You Have a Problem With  : Other (comment) (cocaine)      Medical Problems:   Past Medical History:   Diagnosis Date    ADHD     Bipolar disease, chronic (HCC)     Borderline personality disorder (HCC)     Cervical cancer (HCC)     COPD (chronic obstructive pulmonary disease) (HCC)     Hepatitis C     Liver disease     Hepatitis C: resolved    Seizure (HCC)     last date 6mos to a year ago         Psych History:  Bipolar, Borderline and Polysubstance abuse      Patient is, a smoker.   If so, Nicotine patch ordered? yes     Patient does not drink Alcohol.      Patient does, use Recreational substances or Street Drugs. Daily Cocaine user.       Status EXAM:  Mental Status and Behavioral Exam  Normal: Yes  Level of Assistance: Independent/Self  Facial Expression: Flat  Affect: Blunt  Level of Consciousness: Alert  Frequency of Checks: 4 times per hour, close  Mood:Normal: No  Mood: Labile, Irritable  Motor Activity:Normal: Yes  Eye Contact: Fair  Observed Behavior: Agitated, Preoccupied  Sexual Misconduct 
  Pt  received in bed sleeping       Pt  Attended wrap up group  and pt ate snack , pt rated depression as 5 and anxiety as 5 in group paper.      Upon assessment pt  alert and oriented  x 4, denies SI/HI, pt denies A/V hallucinations.       Pt accepted HS medication and educated about it.          Pt start sleeping by 2115      Pt remained sleeping as of this time, no violent no self harming behavior noticed or reported         
 EXAMINATION:        Pt. Appears Attentive, Cooperative, and Motivated.      Pt's speech is shows no evidence of impairment.     Pt's mood is euthymic.      Pt.'s thinking is Organized.      Pt's associations are Organized and Goal Directed.     Pt.Convincingly exhibits  Negative.  suicidal ideation.      Pt. Exhibits Negative,  homicidal ideation      Pt's CSSR-S level is rated low.       Pt's judgement is age appropriate.      Pt. does not exhibit anxiety with  a good(>30min) Attention span.      BP 94/66   Pulse 93   Temp 97.5 °F (36.4 °C) (Temporal)   Resp 18   Ht 1.655 m (5' 5.16\")   Wt 53.1 kg (117 lb)   SpO2 98%   BMI 19.38 kg/m²     NURSING INTERVENTIONS:  Nursing to administer medications to Pt, with monitoring and recording of compliance symptoms, and possible side effects as appropriate.        RESPONSE TO MEDICATION: Pt. Is   compliant with Medications.    PT. was engaged. Pt. Was  encouraged to participate in activities Showing  Good participation.      Emotional Support and encouragement were provided to Pt.  Pt's response to this intervention appeared to be effective.       Treatment plans up to date.    Pt explained that she is ready for rehab but feels it isn't going to work again.             
 EXAMINATION:        Pt. Appears Cooperative and Withdrawn/Quiet.      Pt's speech is shows no evidence of impairment.     Pt's mood is constricted.      Pt.'s thinking is Organized.      Pt's associations are Organized.     Pt.Convincingly exhibits  Negative.  suicidal ideation.      Pt. Exhibits Negative,  homicidal ideation      Pt's CSSR-S level is rated low.       Pt's judgement is age appropriate.      Pt. does not exhibit anxiety with  a good(>30min) Attention span.      BP (!) 90/55   Pulse (!) 101   Temp 97.7 °F (36.5 °C) (Temporal)   Resp 16   Ht 1.655 m (5' 5.16\")   Wt 53.1 kg (117 lb)   SpO2 99%   BMI 19.38 kg/m²     NURSING INTERVENTIONS:  Nursing to administer medications to Pt, with monitoring and recording of compliance symptoms, and possible side effects as appropriate.        RESPONSE TO MEDICATION: Pt. Is   compliant with Medications.    PT. was engaged. Pt. Was  encouraged to participate in activities Showing  Fair participation.      Emotional Support and encouragement were provided to Pt.  Pt's response to this intervention appeared to be effective.       Treatment plans up to date.    Patient alert and oriented also calm and cooperative with during assessment.  Denies depression, anxiety,  SI/HI/AVH. Denies withdrawal symptoms and cravings Patient accepted into [facility] per therapist. Patient and Dr Palomino notified. Spoke with Nolan at [facility] and she reports that she will be here between 10 and 1030 to  patient to take to rehab.Able to communicate needs. No voiced concerns at this time.         
 EXAMINATION:        Pt. Appears Cooperative, Withdrawn/Quiet, and Guarded.      Pt's speech is shows no evidence of impairment.     Pt's mood is sad.      Pt.'s thinking is circumstantial  Organized.      Pt's associations are Organized.     Pt.Convincingly exhibits  Negative.  suicidal ideation.      Pt. Exhibits Negative,  homicidal ideation      Pt's CSSR-S level is rated low.       Pt's judgement is age appropriate.      Pt. does not exhibit anxiety with  a good(>30min) Attention span.      BP (!) 93/56   Pulse 72   Temp 96.9 °F (36.1 °C) (Temporal)   Resp 16   Ht 1.655 m (5' 5.16\")   Wt 53.1 kg (117 lb)   SpO2 99%   BMI 19.38 kg/m²     NURSING INTERVENTIONS:  Nursing to administer medications to Pt, with monitoring and recording of compliance symptoms, and possible side effects as appropriate.        RESPONSE TO MEDICATION: Pt. Is   compliant with Medications.    PT. was engaged. Pt. Was  encouraged to participate in activities Showing  Good participation.      Emotional Support and encouragement were provided to Pt.  Pt's response to this intervention appeared to be effective.       Treatment plans up to date.    Patient alert and oriented also calm and cooperative with during assessment. Patient reports that she rested well last night with the assistance  of amitriptyline 25 mg nightly.  Rates depression  and anxiety 5/10, denies  SI/HI/AVH. Denies withdrawal symptoms. Able to communicate needs. No voiced concerns at this time        
 EXAMINATION:        Pt. Appears Cooperative, Withdrawn/Quiet, and Guarded.      Pt's speech is shows no evidence of impairment.     Pt's mood is sad.      Pt.'s thinking is circumstantial.     Pt's associations are Preoccupied.     Pt.Convincingly exhibits  Negative.  suicidal ideation.      Pt. Exhibits Negative,  homicidal ideation      Pt's CSSR-S level is rated low.       Pt's judgement is age appropriate.      Pt. does not exhibit anxiety with  a good(>30min) Attention span.      /61   Pulse 76   Temp 98.2 °F (36.8 °C) (Temporal)   Resp 16   Ht 1.655 m (5' 5.16\")   Wt 53.1 kg (117 lb)   SpO2 98%   BMI 19.38 kg/m²     NURSING INTERVENTIONS:  Nursing to administer medications to Pt, with monitoring and recording of compliance symptoms, and possible side effects as appropriate.        RESPONSE TO MEDICATION: Pt. Is   compliant with Medications.    PT. was engaged. Pt. Was  encouraged to participate in activities Showing  Good participation.      Emotional Support and encouragement were provided to Pt.  Pt's response to this intervention appeared to be effective.       Treatment plans up to date.      Patient alert and oriented also calm and cooperative with during assessment.Patient reports that taking the prescribed gabapentin keeps her awake at night.  Denies depression, anxiety, SI/HI/AVH. Able to communicate needs. No voiced concerns at this time.        
 EXAMINATION:        Pt. Appears Withdrawn/Quiet and Guarded.      Pt's speech is shows no evidence of impairment.     Pt's mood is sad.      Pt.'s thinking iscircumstantialPreoccupied.      Pt's associations are Preoccupied.     Pt.Convincingly exhibits  Negative.  suicidal ideation.      Pt. Exhibits Negative,  homicidal ideation      Pt's CSSR-S level is rated low.       Pt's judgement is age appropriate.      Pt. does not exhibit anxiety with a good Attention span.      BP (!) 91/55   Pulse 85   Temp 97.5 °F (36.4 °C) (Temporal)   Resp 16   Ht 1.655 m (5' 5.16\")   Wt 53.1 kg (117 lb)   SpO2 97%   BMI 19.38 kg/m²     NURSING INTERVENTIONS:  Nursing to administer medications to Pt, with monitoring and recording of compliance symptoms, and possible side effects as appropriate.        RESPONSE TO MEDICATION: Pt. Is   compliant with Medications.    PT. was engaged. Pt. Was  encouraged to participate in activities Showing  Fair participation.      Emotional Support and encouragement were provided to Pt.  Pt's response to this intervention appeared to be effective.       Treatment plans up to date.    Patient alert and oriented also calm and cooperative with during assessment.Patient reports that taking the prescribed gabapentin keeps her awake at night.  Rates depression 4/10, denies  anxiety, SI/HI/AVH. Denies withdrawal symptoms. Able to communicate needs. No voiced concerns at this time.               
BEHAVIORAL HEALTH GROUP NOTE FOR NON ATTENDEES        DATE: 2/24/2025      GROUP START: 0900     GROUP END: 0945          GROUP TYPE: Community Meeting        ATTENDANCE: Refused to participate          SIGNATURE:  Steven Dejesus                    
Behavioral Health Treatment Team Note     Patient goal(s) for today: Pt reports \"Focus on rehab.\"  Treatment team focus/goals: medication management, safe discharge planning, attend groups and activities daily    Progress note: Pt observed seated in her room. Pt states she is feeling better overall and wants to go to rehab. Pt alert and oriented x4. Pt denies SI, HI, AVH. Inpatient level of care needed in order to stabilize pt further on medications and to coordinate bed to bed to reduce risk of relapse.     LOS:  5  Expected LOS: 7-8 days    Insurance info/prescription coverage:  Medicaid  Date of last family contact:  Pt has been talking to daughter  Family requesting physician contact today:  No  Discharge plan:  Inpatient rehab  Guns in the home:  No  Outpatient provider(s):  [facility]    Participating treatment team members: Marine Washington, LPC   
Behavioral Health Treatment Team Note     Patient goal(s) for today: Pt reports \"Get into rehab.\"  Treatment team focus/goals: medication management, safe discharge planning, attend groups and activities daily    Progress note: Pt observed seated in the day room. Pt rates depression 5/10. Pt continues to be motivated for rehab and has been attending some groups. Pt alert and oriented x4. Pt denies SI, HI, AVH. Inpatient level of care needed in order to coordinate bed to bed to reduce relapse risk.    LOS:  4  Expected LOS: 5-7 days    Insurance info/prescription coverage:  Medicaid  Date of last family contact:  Writer attempted to contact pt's daughter today regarding rehab  Family requesting physician contact today:  No  Discharge plan:  Inpatient rehab  Guns in the home:  No  Outpatient provider(s):  [facility]    Participating treatment team members: Marine Washington, JAVID   
DISCHARGE SUMMARY    NAME:Kalyn Russo  : 1975  MRN: [id number]    The patient Kalyn Russo exhibits the ability to control behavior in a less restrictive environment.  Patient's level of functioning is improving.  No assaultive/destructive behavior has been observed for the past 24 hours.  No suicidal/homicidal threat or behavior has been observed for the past 24 hours.  There is no evidence of serious medication side effects.  Patient has not been in physical or protective restraints for at least the past 24 hours.    If weapons involved, how are they secured? N/A    Is patient aware of and in agreement with discharge plan? Yes    Arrangements for medication:  Prescriptions on file    Copy of discharge instructions to provider?:  Yes    Arrangements for transportation home:  Pt will be taken to inpatient rehab via [facility] staff    Keep all follow up appointments as scheduled, continue to take prescribed medications per physician instructions.  Mental health crisis number:  911 or your local mental health crisis line number at [phone]      Mental Health Emergency WARM LINE      [phone])      M-F: 9am to 9pm      Sat & Sun: 5pm - 9pm  National suicide prevention lines:                             7-078-XQVAFKD (1-623.756.9743)       5-721-508-TALK ([phone])    Crisis Text Line:  Text HOME to 296568  
Follow up: Writer left message with [facility]. Pt was discharged directly to [facility] staff.   
Informed Dr Palomino that patient has not had a bowel movement in several days. Verbal order for mil of magnesia daily prn, constipation.  
LPN's assessment reviewed  
Michelle Russo, daughter: [phone]    Writer left message after receiving call.   
PSYCHIATRIC PROGRESS NOTE         Patient Name  Kalyn Russo   Date of Birth 1975   [facility] [id number]   Medical Record Number  [id number]      Age  49 y.o.   PCP Rufina Raygoza MD   Admit date:  2/20/2025    Room Number  105/01   [facility]   Date of Service  2/22/2025            HISTORY OF PRESENT ILLNESS/INTERVAL HISTORY:      The patient, Kalyn Russo, is a 49 y.o.  White (non-) female with a past psychiatric history significant for bipolar disorder, stimulant/cocaine use disorder and borderline personality disorder admitted to [facility] following suicide and overdose on medication.  She relapsed on cocaine in Jan and has been using it daily for 2 months. Mood has been mostly depressed with poor sleep, decreased appetite, low energy and feelings of hopelessness, with periods of magdiel not on stimulants.  Nonadherent with medication treatment.    Subjective:  Patient is pleasant and cooperative, dressed in green hospital gown, appears disheveled.  Complained of feeling tired.  Denies withdrawal symptoms and cravings.  Denies depression.  Reports no side effects from medications.  Denies SI and HI.                MENTAL STATUS EXAM & VITALS       Alert and oriented.  Appears disheveled.  Pleasant and cooperative.  Mood is euthymic and affect is congruent. Speech is appropriate in volume, speed, and tone.  Thought process is linear and organized.  No hallucinations.  Memory is intact.  Judgment and insight are limited.  No suicidal ideations, intent, or plan.  No homicidal ideations, intent, or plan.              VITALS:     Patient Vitals for the past 24 hrs:   Temp Pulse Resp BP SpO2   02/22/25 1445 97.5 °F (36.4 °C) 85 16 (!) 91/55 97 %   02/22/25 0606 98.2 °F (36.8 °C) 76 16 112/61 98 %     Wt Readings from Last 3 Encounters:   02/21/25 53.1 kg (117 lb)     Temp Readings from Last 3 Encounters:   02/22/25 97.5 °F (36.4 °C) (Temporal)     BP 
PSYCHIATRIC PROGRESS NOTE         Patient Name  Kalyn Russo   Date of Birth 1975   [facility] [id number]   Medical Record Number  [id number]      Age  49 y.o.   PCP Rufina Raygoza MD   Admit date:  2/20/2025    Room Number  105/01   [facility]   Date of Service  2/23/2025            HISTORY OF PRESENT ILLNESS/INTERVAL HISTORY:      The patient, Kalyn Russo, is a 49 y.o.  White (non-) female with a past psychiatric history significant for bipolar disorder, stimulant/cocaine use disorder and borderline personality disorder admitted to [facility] following suicide and overdose on medication.  She relapsed on cocaine in Jan and has been using it daily for 2 months. Mood has been mostly depressed with poor sleep, decreased appetite, low energy and feelings of hopelessness, with periods of magdiel not on stimulants.  Nonadherent with medication treatment.     Subjective:  Patient is pleasant and cooperative, dressed in green hospital gown, appears disheveled.  Complains that she has not been able to sleep at night.  Trazodone is not effective.  Has taken Amitriptyline before and it has helped with sleep.   Staff reports she has been sleeping during the day.  Depression is moderate.  Anxiety manageable.  Denies SI/HI.  Med compliant.  No reported side effects from meds.              MENTAL STATUS EXAM & VITALS         Alert and oriented.  Appears disheveled.  Calm and cooperative.  Mood is euthymic and affect is congruent. Speech is appropriate in volume, speed, and tone.  Thought process is linear and organized.  No hallucinations.  Memory is intact.  Judgment and insight are limited.  No suicidal ideations, intent, or plan.  No homicidal ideations, intent, or plan.              VITALS:     Patient Vitals for the past 24 hrs:   Temp Pulse Resp BP SpO2   02/23/25 1500 97.5 °F (36.4 °C) 74 18 99/66 99 %     Wt Readings from Last 3 Encounters:   02/21/25 53.1 kg 
PSYCHOSOCIAL ASSESSMENT  :Patient identifying info:   Kalyn Russo is a 49 y.o., female admitted 2/20/2025 11:56 PM     Presenting problem and precipitating factors: Pt admitted voluntarily after an intentional overdose on clonidine. Pt has hx of suicide attempts via overdose. Pt states \"I just didn't want to be here anymore.\" Pt has significant hx of BPD and Bipolar Disorder and multiple psychiatric hospitalizations. Pt reports daily cocaine use.     Mental status assessment: Pt alert and oriented x4. Pt presents as flat and depressed. Pt denies HI, AVH. Pt endorses passive SI currently but does not have a plan. Pt able to inform staff if she feels like acting on her plan. Pt open and receptive to inpatient rehab. Pt has limited insight and judgment at this time.     Strengths/Recreation/Coping Skills: Not voiced at this time     Collateral information: Pt has not signed release at this time    Current psychiatric /substance abuse providers and contact info: None at this time.    Previous psychiatric/substance abuse providers and response to treatment: Pt reports she has been hospitalized several times. Pt recently in rehab for 80 days.     Family history of mental illness or substance abuse: Pt reports alcoholism runs in family     Substance abuse history:    Social History     Tobacco Use    Smoking status: Every Day     Current packs/day: 1.50     Types: Cigarettes    Smokeless tobacco: Never    Tobacco comments:     Nicotine Patch ordered   Substance Use Topics    Alcohol use: Not Currently       History of biomedical complications associated with substance abuse: Pt reports hx of seizures     Patient's current acceptance of treatment or motivation for change: Pt open and receptive to trying inpatient rehab again.     Family constellation: Pt raised by mother. Pt has three adult children     Is significant other involved? No    Describe support system: Pt states mother is supportive.     Describe living 
Patient did not attend group.  
Patient did not attend group.  
Per Dr. Palomino, pt is accepted [facility]. Pt does not require a 1:1 on the BHU provided there is no change in behavioral presentation from the time of acceptance to the time of arrival.  
Provider saw patient via skype with this writer. No new orders given.  
Provider say patient jose r Aragonype with this writer. New order for amitriptyline   25 mg nightly.  
Pt  received in bed sleeping       Pt  Attended wrap up group  and pt ate snack , pt rated depression as 3 and anxiety as 3 in group paper.      Upon assessment pt  alert and oriented  x 4, denies SI/HI, pt denies A/V hallucinations.       Pt accepted HS medication and educated about it.     Pt start sleeping by 2100 , remained sleeping as of this time       no violent no self harming behavior noticed or reported   
Pt  received in bed sleeping       Pt  Attended wrap up group  and pt ate snack , pt rated depression as 5 and anxiety as 5 in group paper.      Upon assessment pt  alert and oriented  x 4, denies SI/HI, pt denies A/V hallucinations.       Pt accepted HS medication and educated about it.     Given at 2056 po prn Trazodone 50 mg to help with sleep, not helpful pt came by 0042 claiming she didn't sleep, given at 0042 po prn Atarax 50 mg for anxiety and to help with sleep, prn helpful by 0115 appeared to be sleeping.          no violent no self harming behavior noticed or reported   
Pt accepted at [facility]. Stated they would be able to  pt tomorrow. 02/26    [facility] phone: [phone]    Pharmacy info:    [address] [place] [address]  Phone: [phone]  
Pt did not come to group    
Pt. Belongings given back to pt., verified all there, signed for. Discharge instructions explained to pt. Including, Follow up appt. With [facility], Rehab .      Medications called into [facility].  Pt. Denies SI/HI at time of discharge.     Discharge Paperwork and H & P, faxed to [facility], With success sheet.     Pt. Displayed no safety concerns at discharge.      Pt. Escorted to front by staff for transportation by [facility], to Facility.    
Pt. Was discharged to [facility] via transportation van in stable cond. Pt. Was escorted out to the van by writer et pt. Ambulated with a steady gait. Pt. Voiced no thoughts of wanting to harm self or others.  
TREATMENT TEAM COMPLETED     Attending meeting was as follows:  Patient, Marine Doherty, Therapist, Writer, and Dr. Palomino.       Meeting was conducted :       In Person  no      Skype   yes         Daily Treatment Team consists of the following:       Pt. Cognitive status:  alert and oriented x 3, pleasant and cooperative, and good eye contact    Pt. Attending Groups: No    Pt. Participating in groups:  No    Pt. Homicidal / Suicidal: normal    Pt. Behaviors:  Anxious, Guarded/Suspicious, Withdrawn, and Cooperative    Pt. Compliant with Medications:  Yes          New Medication orders:  No      Discharge Plan:  Rehab    
TREATMENT TEAM COMPLETED     Attending meeting was as follows:  Patient, Marine Doherty, Therapist, Writer, and Dr. Palomino.       Meeting was conducted :       In Person  no      Skype   yes         Daily Treatment Team consists of the following:       Pt. Cognitive status:  alert and oriented x 3, pleasant and cooperative, and good eye contact    Pt. Attending Groups: Yes    Pt. Participating in groups:  Yes    Pt. Homicidal / Suicidal: normal    Pt. Behaviors:  Cooperative    Pt. Compliant with Medications:  Yes          New Medication orders:  No      Discharge Plan:  Rehab   
Writer faxed clinicals to the following rehabs:    [facility]  [phone]    [facility]:  [phone]    [facility]:  [phone]    [facility]:  [phone]  
Writer spoke with [facility] which was patients last pharmacy prior to there admission to rehab.  Renetta states there is nothing on file with them, and after 18 months the system wipes everything out.   Writer will attempt to find out what rehab this pt. Was at, to try to verify medications from there.  Information passed along to Dr. Palomino.  
surrogate decision maker? No  Does the patient have a Medical Advance Directive? No    Does the patient have a Psychiatric Advance Directive? No  If the patient does not have a surrogate or Medical Advance Directive AND Psychiatric Advance Directive, the patient was offered information on these advance directives Patient declined to complete    Smoking:       Pt. is a smoker.    If pt. Is a smoker, was smoking cessation education provided at discharge? yes    If pt. Is a smoker, were they referred to smoking cessation appointment? yes     Refused?    yes     If pt. Is a smoker, were they offered smoking cessation medication at discharge? yes     Refused?    yes          Substance Abuse/ Alcohol:    Pt. is a drinker / substance abuser.    If pt. Is a drinker, was Alcohol Education Provided at discharge? yes    If pt. Is a drinker / Substance abuser, were they referred to treatment?  yes        Refused?    No Gong to Rehab today    If pt. Is a drinker / Substance abuser, was medication offered for cessation at discharge? yes       Refused?     yes          Patient Instructions: Please continue all medications until otherwise directed by physician.         Patient Transition Record Discussed with Patient and copy given to patient, with pt return verbalization of understanding?   Yes            Patient discharged to Rehab      If pt. Was NOT discharged home and was discharged to another facility, were the following discussed with other facility?     Our 24-hour/7 day content information, including physician for emergencies related to inpatient stay? yes    2.    The Contact information for Pending studies, which is the [phone]?  yes    3.    Pt's plan for follow up, as noted above in follow up?  yes    4.    Pt's primary Physician, other healthcare professional, or site designated for follow-up care? yes